# Patient Record
Sex: FEMALE | Race: WHITE | NOT HISPANIC OR LATINO | Employment: UNEMPLOYED | URBAN - METROPOLITAN AREA
[De-identification: names, ages, dates, MRNs, and addresses within clinical notes are randomized per-mention and may not be internally consistent; named-entity substitution may affect disease eponyms.]

---

## 2020-01-01 ENCOUNTER — APPOINTMENT (OUTPATIENT)
Dept: OTHER | Facility: HOSPITAL | Age: 55
End: 2020-01-01

## 2020-01-01 ENCOUNTER — OFFICE VISIT (OUTPATIENT)
Dept: INTERNAL MEDICINE | Facility: CLINIC | Age: 55
End: 2020-01-01

## 2020-01-01 ENCOUNTER — APPOINTMENT (OUTPATIENT)
Dept: PREADMISSION TESTING | Facility: HOSPITAL | Age: 55
End: 2020-01-01

## 2020-01-01 ENCOUNTER — TRANSCRIBE ORDERS (OUTPATIENT)
Dept: PHYSICAL THERAPY | Facility: CLINIC | Age: 55
End: 2020-01-01

## 2020-01-01 ENCOUNTER — TELEPHONE (OUTPATIENT)
Dept: CASE MANAGEMENT | Facility: OTHER | Age: 55
End: 2020-01-01

## 2020-01-01 ENCOUNTER — PATIENT OUTREACH (OUTPATIENT)
Dept: CASE MANAGEMENT | Facility: OTHER | Age: 55
End: 2020-01-01

## 2020-01-01 ENCOUNTER — PATIENT MESSAGE (OUTPATIENT)
Dept: INTERNAL MEDICINE | Facility: CLINIC | Age: 55
End: 2020-01-01

## 2020-01-01 ENCOUNTER — EPISODE CHANGES (OUTPATIENT)
Dept: CASE MANAGEMENT | Facility: OTHER | Age: 55
End: 2020-01-01

## 2020-01-01 ENCOUNTER — TELEPHONE (OUTPATIENT)
Dept: INTERNAL MEDICINE | Facility: CLINIC | Age: 55
End: 2020-01-01

## 2020-01-01 ENCOUNTER — TREATMENT (OUTPATIENT)
Dept: PHYSICAL THERAPY | Facility: CLINIC | Age: 55
End: 2020-01-01

## 2020-01-01 ENCOUNTER — LAB (OUTPATIENT)
Dept: LAB | Facility: HOSPITAL | Age: 55
End: 2020-01-01

## 2020-01-01 ENCOUNTER — HOSPITAL ENCOUNTER (OUTPATIENT)
Dept: MAMMOGRAPHY | Facility: HOSPITAL | Age: 55
Discharge: HOME | End: 2020-06-19
Admitting: INTERNAL MEDICINE

## 2020-01-01 VITALS
HEART RATE: 116 BPM | TEMPERATURE: 98.2 F | HEIGHT: 66 IN | RESPIRATION RATE: 16 BRPM | WEIGHT: 205.2 LBS | OXYGEN SATURATION: 94 % | BODY MASS INDEX: 32.98 KG/M2 | SYSTOLIC BLOOD PRESSURE: 128 MMHG | DIASTOLIC BLOOD PRESSURE: 80 MMHG

## 2020-01-01 DIAGNOSIS — I10 ESSENTIAL HYPERTENSION: ICD-10-CM

## 2020-01-01 DIAGNOSIS — Z47.89 ORTHOPEDIC AFTERCARE: ICD-10-CM

## 2020-01-01 DIAGNOSIS — F10.20 ALCOHOLISM (HCC): ICD-10-CM

## 2020-01-01 DIAGNOSIS — Z12.31 ENCOUNTER FOR SCREENING MAMMOGRAM FOR MALIGNANT NEOPLASM OF BREAST: ICD-10-CM

## 2020-01-01 DIAGNOSIS — G56.01 CARPAL TUNNEL SYNDROME OF RIGHT WRIST: ICD-10-CM

## 2020-01-01 DIAGNOSIS — G56.01 CARPAL TUNNEL SYNDROME OF RIGHT WRIST: Primary | ICD-10-CM

## 2020-01-01 DIAGNOSIS — G56.03 BILATERAL CARPAL TUNNEL SYNDROME: Primary | ICD-10-CM

## 2020-01-01 DIAGNOSIS — I10 ESSENTIAL HYPERTENSION: Primary | ICD-10-CM

## 2020-01-01 DIAGNOSIS — L70.0 ACNE VULGARIS: ICD-10-CM

## 2020-01-01 DIAGNOSIS — G56.03 BILATERAL CARPAL TUNNEL SYNDROME: ICD-10-CM

## 2020-01-01 LAB
ALBUMIN SERPL-MCNC: 4.4 G/DL (ref 3.5–5.2)
ALBUMIN/GLOB SERPL: 1.3 G/DL
ALP SERPL-CCNC: 101 U/L (ref 39–117)
ALT SERPL W P-5'-P-CCNC: 20 U/L (ref 1–33)
ANION GAP SERPL CALCULATED.3IONS-SCNC: 10.7 MMOL/L (ref 5–15)
AST SERPL-CCNC: 35 U/L (ref 1–32)
BILIRUB SERPL-MCNC: 0.3 MG/DL (ref 0–1.2)
BUN SERPL-MCNC: 13 MG/DL (ref 6–20)
BUN/CREAT SERPL: 14.6 (ref 7–25)
CALCIUM SPEC-SCNC: 9.3 MG/DL (ref 8.6–10.5)
CHLORIDE SERPL-SCNC: 97 MMOL/L (ref 98–107)
CO2 SERPL-SCNC: 24.3 MMOL/L (ref 22–29)
CREAT SERPL-MCNC: 0.89 MG/DL (ref 0.57–1)
DEPRECATED RDW RBC AUTO: 42.2 FL (ref 37–54)
ERYTHROCYTE [DISTWIDTH] IN BLOOD BY AUTOMATED COUNT: 12.8 % (ref 12.3–15.4)
GFR SERPL CREATININE-BSD FRML MDRD: 66 ML/MIN/1.73
GLOBULIN UR ELPH-MCNC: 3.5 GM/DL
GLUCOSE SERPL-MCNC: 91 MG/DL (ref 65–99)
HCT VFR BLD AUTO: 40.5 % (ref 34–46.6)
HGB BLD-MCNC: 14.1 G/DL (ref 12–15.9)
MCH RBC QN AUTO: 31.5 PG (ref 26.6–33)
MCHC RBC AUTO-ENTMCNC: 34.8 G/DL (ref 31.5–35.7)
MCV RBC AUTO: 90.4 FL (ref 79–97)
PLATELET # BLD AUTO: 338 10*3/MM3 (ref 140–450)
PMV BLD AUTO: 9.7 FL (ref 6–12)
POTASSIUM SERPL-SCNC: 4.2 MMOL/L (ref 3.5–5.2)
PROT SERPL-MCNC: 7.9 G/DL (ref 6–8.5)
RBC # BLD AUTO: 4.48 10*6/MM3 (ref 3.77–5.28)
REF LAB TEST METHOD: NORMAL
REF LAB TEST METHOD: NORMAL
SARS-COV-2 RNA RESP QL NAA+PROBE: NOT DETECTED
SARS-COV-2 RNA RESP QL NAA+PROBE: NOT DETECTED
SODIUM SERPL-SCNC: 132 MMOL/L (ref 136–145)
WBC # BLD AUTO: 8.93 10*3/MM3 (ref 3.4–10.8)

## 2020-01-01 PROCEDURE — 77067 SCR MAMMO BI INCL CAD: CPT

## 2020-01-01 PROCEDURE — U0002 COVID-19 LAB TEST NON-CDC: HCPCS

## 2020-01-01 PROCEDURE — C9803 HOPD COVID-19 SPEC COLLECT: HCPCS

## 2020-01-01 PROCEDURE — PTNOCHG PR CUSTOM PT NO CHARGE VISIT: Performed by: PHYSICAL THERAPIST

## 2020-01-01 PROCEDURE — 85027 COMPLETE CBC AUTOMATED: CPT | Performed by: INTERNAL MEDICINE

## 2020-01-01 PROCEDURE — 99214 OFFICE O/P EST MOD 30 MIN: CPT | Performed by: INTERNAL MEDICINE

## 2020-01-01 PROCEDURE — 77063 BREAST TOMOSYNTHESIS BI: CPT | Performed by: RADIOLOGY

## 2020-01-01 PROCEDURE — 77067 SCR MAMMO BI INCL CAD: CPT | Performed by: RADIOLOGY

## 2020-01-01 PROCEDURE — 77063 BREAST TOMOSYNTHESIS BI: CPT

## 2020-01-01 PROCEDURE — U0004 COV-19 TEST NON-CDC HGH THRU: HCPCS

## 2020-01-01 PROCEDURE — 80053 COMPREHEN METABOLIC PANEL: CPT | Performed by: INTERNAL MEDICINE

## 2020-01-01 RX ORDER — LOSARTAN POTASSIUM 50 MG/1
50 TABLET ORAL DAILY
Qty: 22 TABLET | Refills: 0 | Status: SHIPPED | OUTPATIENT
Start: 2020-01-01 | End: 2020-01-01 | Stop reason: SDUPTHER

## 2020-01-01 RX ORDER — DISULFIRAM 250 MG/1
TABLET ORAL
COMMUNITY
Start: 2020-01-01 | End: 2020-01-01

## 2020-01-01 RX ORDER — NALTREXONE HYDROCHLORIDE 50 MG/1
TABLET, FILM COATED ORAL
COMMUNITY
Start: 2020-01-01 | End: 2021-01-01

## 2020-01-01 RX ORDER — CLINDAMYCIN AND BENZOYL PEROXIDE 10; 50 MG/G; MG/G
GEL TOPICAL DAILY
Qty: 50 G | Refills: 11 | Status: SHIPPED | OUTPATIENT
Start: 2020-01-01

## 2020-01-01 RX ORDER — DOXEPIN HYDROCHLORIDE 10 MG/1
10 CAPSULE ORAL NIGHTLY
COMMUNITY
Start: 2020-01-01

## 2020-01-01 RX ORDER — PREDNISONE 20 MG/1
20 TABLET ORAL DAILY
Qty: 5 TABLET | Refills: 0 | Status: SHIPPED | OUTPATIENT
Start: 2020-01-01 | End: 2020-01-01

## 2020-01-01 RX ORDER — LOSARTAN POTASSIUM 50 MG/1
50 TABLET ORAL DAILY
Qty: 30 TABLET | Refills: 5 | Status: SHIPPED | OUTPATIENT
Start: 2020-01-01 | End: 2021-01-01 | Stop reason: SDUPTHER

## 2020-01-01 RX ORDER — BUSPIRONE HYDROCHLORIDE 10 MG/1
10 TABLET ORAL 2 TIMES DAILY
COMMUNITY
Start: 2020-01-01

## 2020-02-26 ENCOUNTER — OFFICE VISIT (OUTPATIENT)
Dept: INTERNAL MEDICINE | Facility: CLINIC | Age: 55
End: 2020-02-26

## 2020-02-26 ENCOUNTER — TELEPHONE (OUTPATIENT)
Dept: INTERNAL MEDICINE | Facility: CLINIC | Age: 55
End: 2020-02-26

## 2020-02-26 VITALS
DIASTOLIC BLOOD PRESSURE: 74 MMHG | SYSTOLIC BLOOD PRESSURE: 150 MMHG | HEART RATE: 89 BPM | TEMPERATURE: 97.2 F | RESPIRATION RATE: 16 BRPM | BODY MASS INDEX: 28.67 KG/M2 | HEIGHT: 66 IN | OXYGEN SATURATION: 98 % | WEIGHT: 178.4 LBS

## 2020-02-26 DIAGNOSIS — Z12.11 SCREENING FOR MALIGNANT NEOPLASM OF COLON: ICD-10-CM

## 2020-02-26 DIAGNOSIS — G62.9 NEUROPATHY: ICD-10-CM

## 2020-02-26 DIAGNOSIS — M50.30 DDD (DEGENERATIVE DISC DISEASE), CERVICAL: ICD-10-CM

## 2020-02-26 DIAGNOSIS — H91.93 BILATERAL HEARING LOSS, UNSPECIFIED HEARING LOSS TYPE: ICD-10-CM

## 2020-02-26 DIAGNOSIS — T14.8XXA BLISTERING OF SKIN: ICD-10-CM

## 2020-02-26 DIAGNOSIS — F41.9 ANXIETY: ICD-10-CM

## 2020-02-26 DIAGNOSIS — F10.20 ALCOHOLISM (HCC): Primary | ICD-10-CM

## 2020-02-26 DIAGNOSIS — F33.1 MODERATE EPISODE OF RECURRENT MAJOR DEPRESSIVE DISORDER (HCC): ICD-10-CM

## 2020-02-26 DIAGNOSIS — R30.0 DYSURIA: ICD-10-CM

## 2020-02-26 DIAGNOSIS — Z12.31 ENCOUNTER FOR SCREENING MAMMOGRAM FOR MALIGNANT NEOPLASM OF BREAST: ICD-10-CM

## 2020-02-26 DIAGNOSIS — G89.29 CHRONIC BILATERAL LOW BACK PAIN, UNSPECIFIED WHETHER SCIATICA PRESENT: ICD-10-CM

## 2020-02-26 DIAGNOSIS — L65.9 ALOPECIA: ICD-10-CM

## 2020-02-26 DIAGNOSIS — M54.50 CHRONIC BILATERAL LOW BACK PAIN, UNSPECIFIED WHETHER SCIATICA PRESENT: ICD-10-CM

## 2020-02-26 DIAGNOSIS — R03.0 ELEVATED BP WITHOUT DIAGNOSIS OF HYPERTENSION: ICD-10-CM

## 2020-02-26 LAB
BILIRUB BLD-MCNC: NEGATIVE MG/DL
CLARITY, POC: CLEAR
COLOR UR: YELLOW
GLUCOSE UR STRIP-MCNC: NEGATIVE MG/DL
KETONES UR QL: NEGATIVE
LEUKOCYTE EST, POC: NEGATIVE
NITRITE UR-MCNC: NEGATIVE MG/ML
PH UR: 6.5 [PH] (ref 5–8)
PROT UR STRIP-MCNC: NEGATIVE MG/DL
RBC # UR STRIP: NEGATIVE /UL
SP GR UR: 1.02 (ref 1–1.03)
UROBILINOGEN UR QL: NORMAL

## 2020-02-26 PROCEDURE — 81003 URINALYSIS AUTO W/O SCOPE: CPT | Performed by: INTERNAL MEDICINE

## 2020-02-26 PROCEDURE — 99204 OFFICE O/P NEW MOD 45 MIN: CPT | Performed by: INTERNAL MEDICINE

## 2020-02-26 PROCEDURE — 90471 IMMUNIZATION ADMIN: CPT | Performed by: INTERNAL MEDICINE

## 2020-02-26 PROCEDURE — 90632 HEPA VACCINE ADULT IM: CPT | Performed by: INTERNAL MEDICINE

## 2020-02-26 PROCEDURE — 90750 HZV VACC RECOMBINANT IM: CPT | Performed by: INTERNAL MEDICINE

## 2020-02-26 PROCEDURE — 90472 IMMUNIZATION ADMIN EACH ADD: CPT | Performed by: INTERNAL MEDICINE

## 2020-02-26 RX ORDER — THIAMINE MONONITRATE (VIT B1) 100 MG
100 TABLET ORAL DAILY
Qty: 90 TABLET | Refills: 1 | Status: SHIPPED | OUTPATIENT
Start: 2020-02-26 | End: 2020-01-01

## 2020-02-26 RX ORDER — AMOXICILLIN 500 MG
500 CAPSULE ORAL 3 TIMES DAILY
COMMUNITY
End: 2020-04-28

## 2020-02-26 RX ORDER — CALCIUM CITRATE/VITAMIN D3 200MG-6.25
50 TABLET ORAL DAILY
COMMUNITY

## 2020-02-26 RX ORDER — BUSPIRONE HYDROCHLORIDE 5 MG/1
10 TABLET ORAL 2 TIMES DAILY
COMMUNITY
End: 2020-01-01

## 2020-02-26 RX ORDER — ACYCLOVIR 50 MG/G
CREAM TOPICAL
COMMUNITY

## 2020-02-26 RX ORDER — ACYCLOVIR 200 MG/1
CAPSULE ORAL
COMMUNITY

## 2020-02-26 RX ORDER — THIAMINE MONONITRATE (VIT B1) 100 MG
100 TABLET ORAL DAILY
COMMUNITY
End: 2020-02-26 | Stop reason: SDUPTHER

## 2020-02-26 RX ORDER — FLUOXETINE HYDROCHLORIDE 20 MG/1
20 CAPSULE ORAL DAILY
COMMUNITY

## 2020-02-26 RX ORDER — TRAZODONE HYDROCHLORIDE 100 MG/1
100 TABLET ORAL NIGHTLY
COMMUNITY
End: 2020-01-01

## 2020-02-26 RX ORDER — GABAPENTIN 300 MG/1
300 CAPSULE ORAL EVERY 6 HOURS PRN
COMMUNITY
End: 2020-04-28

## 2020-02-26 RX ORDER — FOLIC ACID 1 MG/1
1 TABLET ORAL DAILY
COMMUNITY
End: 2020-02-26 | Stop reason: SDUPTHER

## 2020-02-26 RX ORDER — UREA 10 %
125 LOTION (ML) TOPICAL 2 TIMES DAILY
COMMUNITY
End: 2021-01-01

## 2020-02-26 RX ORDER — LANOLIN ALCOHOL/MO/W.PET/CERES
3000 CREAM (GRAM) TOPICAL DAILY
COMMUNITY

## 2020-02-26 RX ORDER — FOLIC ACID 1 MG/1
1 TABLET ORAL DAILY
Qty: 90 TABLET | Refills: 1 | Status: SHIPPED | OUTPATIENT
Start: 2020-02-26

## 2020-02-27 ENCOUNTER — PATIENT OUTREACH (OUTPATIENT)
Dept: CASE MANAGEMENT | Facility: OTHER | Age: 55
End: 2020-02-27

## 2020-02-27 NOTE — OUTREACH NOTE
"Patient Outreach Note    SW contacted pt to follow up. Pt stated she would like information about local AA meetings.  Pt stated she is currently living with her sister and her sister has told her that \"in order to stay she has to go to meetings everyday\".  Pt stated she is going to [facility] on Wed and Fridays and sees a therapist.    DIPTI will research local AA meeting info and let pt know    TESSIE Doyle  Ambulatory     2/27/2020, 12:47 PM      "

## 2020-02-27 NOTE — OUTREACH NOTE
Patient Outreach Note    SW attempted to reach pt to discuss local options for AA. No answer. TESSIE Maria  Ambulatory     2/27/2020, 12:49 PM

## 2020-02-27 NOTE — OUTREACH NOTE
Patient Outreach Note    SW received a call from pt. SW provided pt with contact info to local [facility] meetings. Pt was grateful for the info and stated she will call them today.     TESSIE Doyle  Ambulatory     2/27/2020, 2:21 PM

## 2020-02-28 RX ORDER — SODIUM, POTASSIUM,MAG SULFATES 17.5-3.13G
1 SOLUTION, RECONSTITUTED, ORAL ORAL TAKE AS DIRECTED
Qty: 2 BOTTLE | Refills: 0 | Status: SHIPPED | OUTPATIENT
Start: 2020-02-28 | End: 2020-03-24

## 2020-03-01 PROBLEM — R03.0 ELEVATED BP WITHOUT DIAGNOSIS OF HYPERTENSION: Status: ACTIVE | Noted: 2020-03-01

## 2020-03-01 PROBLEM — H91.93 BILATERAL HEARING LOSS: Status: ACTIVE | Noted: 2020-03-01

## 2020-03-01 PROBLEM — G62.9 NEUROPATHY: Status: ACTIVE | Noted: 2020-03-01

## 2020-03-01 PROBLEM — F41.9 ANXIETY: Status: ACTIVE | Noted: 2020-03-01

## 2020-03-01 PROBLEM — T14.8XXA BLISTERING OF SKIN: Status: ACTIVE | Noted: 2020-03-01

## 2020-03-01 PROBLEM — F10.20 ALCOHOLISM (HCC): Status: ACTIVE | Noted: 2020-03-01

## 2020-03-01 PROBLEM — G89.29 CHRONIC BILATERAL LOW BACK PAIN: Status: ACTIVE | Noted: 2020-03-01

## 2020-03-01 PROBLEM — M54.50 CHRONIC BILATERAL LOW BACK PAIN: Status: ACTIVE | Noted: 2020-03-01

## 2020-03-01 PROBLEM — F33.1 MODERATE EPISODE OF RECURRENT MAJOR DEPRESSIVE DISORDER (HCC): Status: ACTIVE | Noted: 2020-03-01

## 2020-03-01 PROBLEM — L65.9 ALOPECIA: Status: ACTIVE | Noted: 2020-03-01

## 2020-03-01 PROBLEM — M50.30 DDD (DEGENERATIVE DISC DISEASE), CERVICAL: Status: ACTIVE | Noted: 2020-03-01

## 2020-03-04 ENCOUNTER — OUTSIDE FACILITY SERVICE (OUTPATIENT)
Dept: GASTROENTEROLOGY | Facility: CLINIC | Age: 55
End: 2020-03-04

## 2020-03-04 PROCEDURE — 45378 DIAGNOSTIC COLONOSCOPY: CPT | Performed by: INTERNAL MEDICINE

## 2020-03-06 ENCOUNTER — PATIENT OUTREACH (OUTPATIENT)
Dept: CASE MANAGEMENT | Facility: OTHER | Age: 55
End: 2020-03-06

## 2020-03-06 NOTE — OUTREACH NOTE
Patient Outreach Note    SW attemtped to contact pt. No answer. TESSIE Maria  Ambulatory     3/6/2020, 10:16 AM

## 2020-03-24 ENCOUNTER — OFFICE VISIT (OUTPATIENT)
Dept: INTERNAL MEDICINE | Facility: CLINIC | Age: 55
End: 2020-03-24

## 2020-03-24 VITALS
SYSTOLIC BLOOD PRESSURE: 148 MMHG | HEIGHT: 66 IN | TEMPERATURE: 97.5 F | WEIGHT: 179.8 LBS | BODY MASS INDEX: 28.9 KG/M2 | DIASTOLIC BLOOD PRESSURE: 82 MMHG | OXYGEN SATURATION: 99 % | HEART RATE: 91 BPM | RESPIRATION RATE: 16 BRPM

## 2020-03-24 DIAGNOSIS — I10 ESSENTIAL HYPERTENSION: ICD-10-CM

## 2020-03-24 DIAGNOSIS — Z13.220 SCREENING, LIPID: ICD-10-CM

## 2020-03-24 DIAGNOSIS — F10.20 ALCOHOLISM (HCC): Primary | ICD-10-CM

## 2020-03-24 DIAGNOSIS — Z86.39 HISTORY OF IRON DEFICIENCY: ICD-10-CM

## 2020-03-24 DIAGNOSIS — Z86.39 HISTORY OF VITAMIN D DEFICIENCY: ICD-10-CM

## 2020-03-24 DIAGNOSIS — Z13.1 SCREENING FOR DIABETES MELLITUS: ICD-10-CM

## 2020-03-24 PROCEDURE — 99214 OFFICE O/P EST MOD 30 MIN: CPT | Performed by: INTERNAL MEDICINE

## 2020-03-24 RX ORDER — CLONIDINE HYDROCHLORIDE 0.1 MG/1
0.1 TABLET ORAL DAILY
COMMUNITY

## 2020-03-24 RX ORDER — DISULFIRAM 500 MG/1
500 TABLET ORAL DAILY
COMMUNITY
End: 2020-01-01

## 2020-03-24 RX ORDER — FERROUS SULFATE TAB EC 324 MG (65 MG FE EQUIVALENT) 324 (65 FE) MG
324 TABLET DELAYED RESPONSE ORAL 2 TIMES DAILY
COMMUNITY
End: 2020-01-01

## 2020-03-24 RX ORDER — LOSARTAN POTASSIUM 25 MG/1
25 TABLET ORAL DAILY
Qty: 90 TABLET | Refills: 0 | Status: SHIPPED | OUTPATIENT
Start: 2020-03-24 | End: 2020-04-28 | Stop reason: SDUPTHER

## 2020-03-24 NOTE — PATIENT INSTRUCTIONS
Call if BP persistently high, over 160/100, or persistently low, less than 100/60. Goal is 130/80 or less.

## 2020-03-24 NOTE — PROGRESS NOTES
Internal Medicine Follow Up    Chief Complaint  Laura Jang is a 54 y.o. female who presents today for follow up of chronic medical conditions outlined below.    Chief Complaint   Patient presents with   • Alcohol Problem     4 week follow up        HPI  Ms. Jang comes in today for follow up. She has been sober for 6 weeks. No cravings. On antabuse prescribed by her psychiatrist, Dr. uLca Cedeño. She continues to see her therapist and will see her today. Will see her psychiatrist tomorrow. She is participating daily and sometimes twice daily in meetings online. She finds this to be helpful. She notes that her sister still restricts her ability to drive. She is unsure when she will be able to drive again but she is doing okay. Hopeful that she will be able to drive again eventually. She reports recently trying to donate blood and being told that she was borderline iron deficient. She has had recent normal colonoscopy, has no visible GI bleeding, and no vaginal bleeding. She is taking an iron supplement and eating iron rich food. She also notes recently elevated BP, brings a log today. She forgot to mention that she was previously prescribed antihypertensives prior to bariatric surgery (lap band and then gastric sleeve) and then more recently was prescribed clonidine PRN which she has been taking about 0.1mg daily.           Review of Systems  Review of Systems   Constitutional: Negative.    Respiratory: Negative.    Cardiovascular: Negative.    Gastrointestinal: Negative for blood in stool.   Genitourinary: Negative for vaginal bleeding.   Psychiatric/Behavioral: Negative.         Current Medications  Current Outpatient Medications on File Prior to Visit   Medication Sig Dispense Refill   • acyclovir (ZOVIRAX) 200 MG capsule Take  by mouth Every 4 (Four) Hours While Awake. 400mg the first day and 200mg for 4 days.     • acyclovir (ZOVIRAX) 5 % cream Apply  topically to the appropriate area as directed Every 3 (Three)  "Hours.     • B Complex-Biotin-FA (B-100 B-COMPLEX PO) Take  by mouth Daily.     • BIOTIN 5000 PO Take 5,000 mcg by mouth Daily.     • busPIRone (BUSPAR) 5 MG tablet Take 5 mg by mouth Daily With Lunch.     • Cholecalciferol (VITAMIN D3) 125 MCG (5000 UT) capsule capsule Take 5,000 Units by mouth Daily.     • cloNIDine (CATAPRES) 0.1 MG tablet Take 0.1 mg by mouth Daily.     • disulfiram (ANTABUSE) 500 MG tablet tablet Take 500 mg by mouth Daily.     • ferrous sulfate 324 (65 Fe) MG tablet delayed-release EC tablet Take 324 mg by mouth 2 (Two) Times a Day.     • FLUoxetine (PROzac) 20 MG capsule Take 20 mg by mouth Daily.     • folic acid (FOLVITE) 1 MG tablet Take 1 tablet by mouth Daily. 90 tablet 1   • gabapentin (NEURONTIN) 300 MG capsule Take 300 mg by mouth Every 6 (Six) Hours As Needed.     • L-Tryptophan 500 MG capsule Take 500 mg by mouth 3 (Three) Times a Day.     • magnesium, as, gluconate (MAGONATE) 500 (27 Mg) MG tablet Take 125 mg by mouth 2 (Two) Times a Day.     • Multiple Vitamins-Minerals (HAIR SKIN AND NAILS FORMULA PO) Take 2,500 mcg by mouth Daily.     • Multiple Vitamins-Minerals (MULTIVITAMIN GUMMIES WOMENS) chewable tablet Chew 50 mg Daily.     • sodium-potassium-magnesium sulfates (SUPREP BOWEL PREP KIT) 17.5-3.13-1.6 GM/177ML solution oral solution Take 1 bottle by mouth Take As Directed. Follow instructions that were mailed to your home. If you didn't receive these call [phone]. 2 bottle 0   • thiamine (VITAMIN B-1) 100 MG tablet Take 1 tablet by mouth Daily. 90 tablet 1   • traZODone (DESYREL) 100 MG tablet Take 100 mg by mouth Every Night.     • vitamin B-12 (CYANOCOBALAMIN) 1000 MCG tablet Take 3,000 mcg by mouth Daily.       No current facility-administered medications on file prior to visit.        Allergies  No Known Allergies    Objective  Visit Vitals  /82   Pulse 91   Temp 97.5 °F (36.4 °C)   Resp 16   Ht 167.6 cm (65.98\")   Wt 81.6 kg (179 lb 12.8 oz)   SpO2 99%   BMI " 29.03 kg/m²        Physical Exam  Physical Exam   Constitutional: She is oriented to person, place, and time. She appears well-developed and well-nourished. No distress.   HENT:   Head: Normocephalic and atraumatic.   Eyes: Conjunctivae are normal.   Cardiovascular: Normal rate, regular rhythm and normal heart sounds.   Pulmonary/Chest: Effort normal and breath sounds normal. No respiratory distress.   Musculoskeletal: She exhibits no edema.   Neurological: She is alert and oriented to person, place, and time.   Skin: Skin is warm and dry.   Psychiatric: She has a normal mood and affect. Her behavior is normal.   Nursing note and vitals reviewed.      Results  Results for orders placed or performed in visit on 02/26/20   POC Urinalysis Dipstick, Automated   Result Value Ref Range    Color Yellow Yellow, Straw, Dark Yellow, Cristina    Clarity, UA Clear Clear    Specific Gravity  1.020 1.005 - 1.030    pH, Urine 6.5 5.0 - 8.0    Leukocytes Negative Negative    Nitrite, UA Negative Negative    Protein, POC Negative Negative mg/dL    Glucose, UA Negative Negative, 1000 mg/dL (3+) mg/dL    Ketones, UA Negative Negative    Urobilinogen, UA Normal Normal    Bilirubin Negative Negative    Blood, UA Negative Negative        Assessment and Plan  Laura was seen today for alcohol problem.    Diagnoses and all orders for this visit:    Alcoholism (CMS/[facility])  - Sober x 6 weeks on antabuse, seeing psychiatrist/therapist, daily meetings  - On thiamine, folate. Will continue for now.  - CMP ordered    Essential hypertension  - BP above goal in office and on home readings, averaging 140-150/90  - Will start losartan 25mg daily  - Patient to get baseline CMP, will need repeat BMP in 7-14 days.  - Continue home monitoring.    History of iron deficiency  - She reports iron deficiency on screening prior to attempt to donate blood  - No overt GI bleeding, recent normal colonoscopy. No vaginal bleeding. Has had gastric sleeve and lap band but  no gastric bypass.   - Currently on iron supplementation.  - Will obtain iron profile, ferritin, CBC.    History of vitamin D deficiency  - On vitamin D supplement daily, will obtain vitamin D level    Screening for diabetes mellitus  - A1c ordered    Screening, lipid  - Lipid panel ordered    Health Maintenance  - Pap smear: Reports normal pap smear 12/2019, will need to refer to GYN closer to 12/2020  - Mammogram: Scheduled 6/19/2020  - Colonoscopy: 3/2020, repeat 10y  - Immunizations: Flu UTD. Hep A #1 2/2020. Shingrix #1 2/2020. Tdap 2012.  - HCV: Reports negative screening  - Depression screening: Diagnosed and treated for depression.    Return in about 2 months (around 5/24/2020) for Follow up, labs at your next available convenience.

## 2020-03-25 ENCOUNTER — LAB (OUTPATIENT)
Dept: LAB | Facility: HOSPITAL | Age: 55
End: 2020-03-25

## 2020-03-25 DIAGNOSIS — Z86.39 HISTORY OF VITAMIN D DEFICIENCY: ICD-10-CM

## 2020-03-25 DIAGNOSIS — F10.20 ALCOHOLISM (HCC): ICD-10-CM

## 2020-03-25 DIAGNOSIS — I10 ESSENTIAL HYPERTENSION: ICD-10-CM

## 2020-03-25 DIAGNOSIS — Z86.39 HISTORY OF IRON DEFICIENCY: ICD-10-CM

## 2020-03-25 DIAGNOSIS — Z13.220 SCREENING, LIPID: ICD-10-CM

## 2020-03-25 DIAGNOSIS — Z13.1 SCREENING FOR DIABETES MELLITUS: ICD-10-CM

## 2020-03-25 LAB
25(OH)D3 SERPL-MCNC: 73.7 NG/ML (ref 30–100)
ALBUMIN SERPL-MCNC: 4.4 G/DL (ref 3.5–5.2)
ALBUMIN/GLOB SERPL: 1.3 G/DL
ALP SERPL-CCNC: 154 U/L (ref 39–117)
ALT SERPL W P-5'-P-CCNC: 32 U/L (ref 1–33)
ANION GAP SERPL CALCULATED.3IONS-SCNC: 13.3 MMOL/L (ref 5–15)
AST SERPL-CCNC: 33 U/L (ref 1–32)
BASOPHILS # BLD AUTO: 0.07 10*3/MM3 (ref 0–0.2)
BASOPHILS NFR BLD AUTO: 1.1 % (ref 0–1.5)
BILIRUB SERPL-MCNC: 0.3 MG/DL (ref 0.2–1.2)
BUN BLD-MCNC: 15 MG/DL (ref 6–20)
BUN/CREAT SERPL: 18.8 (ref 7–25)
CALCIUM SPEC-SCNC: 10 MG/DL (ref 8.6–10.5)
CHLORIDE SERPL-SCNC: 102 MMOL/L (ref 98–107)
CHOLEST SERPL-MCNC: 198 MG/DL (ref 0–200)
CO2 SERPL-SCNC: 24.7 MMOL/L (ref 22–29)
CREAT BLD-MCNC: 0.8 MG/DL (ref 0.57–1)
DEPRECATED RDW RBC AUTO: 45.4 FL (ref 37–54)
EOSINOPHIL # BLD AUTO: 0.42 10*3/MM3 (ref 0–0.4)
EOSINOPHIL NFR BLD AUTO: 6.7 % (ref 0.3–6.2)
ERYTHROCYTE [DISTWIDTH] IN BLOOD BY AUTOMATED COUNT: 13.8 % (ref 12.3–15.4)
FERRITIN SERPL-MCNC: 60.1 NG/ML (ref 13–150)
GFR SERPL CREATININE-BSD FRML MDRD: 74 ML/MIN/1.73
GLOBULIN UR ELPH-MCNC: 3.4 GM/DL
GLUCOSE BLD-MCNC: 101 MG/DL (ref 65–99)
HBA1C MFR BLD: 5.2 % (ref 4.8–5.6)
HCT VFR BLD AUTO: 36.9 % (ref 34–46.6)
HDLC SERPL-MCNC: 54 MG/DL (ref 40–60)
HGB BLD-MCNC: 11.9 G/DL (ref 12–15.9)
IMM GRANULOCYTES # BLD AUTO: 0.01 10*3/MM3 (ref 0–0.05)
IMM GRANULOCYTES NFR BLD AUTO: 0.2 % (ref 0–0.5)
IRON 24H UR-MRATE: 99 MCG/DL (ref 37–145)
IRON SATN MFR SERPL: 22 % (ref 20–50)
LDLC SERPL CALC-MCNC: 121 MG/DL (ref 0–100)
LDLC/HDLC SERPL: 2.23 {RATIO}
LYMPHOCYTES # BLD AUTO: 1.95 10*3/MM3 (ref 0.7–3.1)
LYMPHOCYTES NFR BLD AUTO: 30.9 % (ref 19.6–45.3)
MCH RBC QN AUTO: 29.4 PG (ref 26.6–33)
MCHC RBC AUTO-ENTMCNC: 32.2 G/DL (ref 31.5–35.7)
MCV RBC AUTO: 91.1 FL (ref 79–97)
MONOCYTES # BLD AUTO: 0.69 10*3/MM3 (ref 0.1–0.9)
MONOCYTES NFR BLD AUTO: 10.9 % (ref 5–12)
NEUTROPHILS # BLD AUTO: 3.17 10*3/MM3 (ref 1.7–7)
NEUTROPHILS NFR BLD AUTO: 50.2 % (ref 42.7–76)
NRBC BLD AUTO-RTO: 0 /100 WBC (ref 0–0.2)
PLATELET # BLD AUTO: 284 10*3/MM3 (ref 140–450)
PMV BLD AUTO: 11.3 FL (ref 6–12)
POTASSIUM BLD-SCNC: 4.4 MMOL/L (ref 3.5–5.2)
PROT SERPL-MCNC: 7.8 G/DL (ref 6–8.5)
RBC # BLD AUTO: 4.05 10*6/MM3 (ref 3.77–5.28)
SODIUM BLD-SCNC: 140 MMOL/L (ref 136–145)
TIBC SERPL-MCNC: 453 MCG/DL (ref 298–536)
TRANSFERRIN SERPL-MCNC: 304 MG/DL (ref 200–360)
TRIGL SERPL-MCNC: 117 MG/DL (ref 0–150)
VLDLC SERPL-MCNC: 23.4 MG/DL (ref 5–40)
WBC NRBC COR # BLD: 6.31 10*3/MM3 (ref 3.4–10.8)

## 2020-03-25 PROCEDURE — 85025 COMPLETE CBC W/AUTO DIFF WBC: CPT | Performed by: INTERNAL MEDICINE

## 2020-03-25 PROCEDURE — 82306 VITAMIN D 25 HYDROXY: CPT | Performed by: INTERNAL MEDICINE

## 2020-03-25 PROCEDURE — 84466 ASSAY OF TRANSFERRIN: CPT | Performed by: INTERNAL MEDICINE

## 2020-03-25 PROCEDURE — 80053 COMPREHEN METABOLIC PANEL: CPT | Performed by: INTERNAL MEDICINE

## 2020-03-25 PROCEDURE — 80061 LIPID PANEL: CPT | Performed by: INTERNAL MEDICINE

## 2020-03-25 PROCEDURE — 82728 ASSAY OF FERRITIN: CPT | Performed by: INTERNAL MEDICINE

## 2020-03-25 PROCEDURE — 83540 ASSAY OF IRON: CPT | Performed by: INTERNAL MEDICINE

## 2020-03-25 PROCEDURE — 83036 HEMOGLOBIN GLYCOSYLATED A1C: CPT | Performed by: INTERNAL MEDICINE

## 2020-04-02 ENCOUNTER — TELEPHONE (OUTPATIENT)
Dept: INTERNAL MEDICINE | Facility: CLINIC | Age: 55
End: 2020-04-02

## 2020-04-02 DIAGNOSIS — I10 ESSENTIAL HYPERTENSION: Primary | ICD-10-CM

## 2020-04-02 NOTE — TELEPHONE ENCOUNTER
Patient requesting a call back from the provider/nurse. Patient stated that since being seen last week she was advised by Dr. Santizo to come back 7-10 days to lave labs completed. She is wanting to know if she has orders and when she can come in to have those completed.    Please call patient and advise @[phone]

## 2020-04-02 NOTE — TELEPHONE ENCOUNTER
I have ordered her kidney function and electrolytes to be repeated. She can come anytime that the lab is open. She should call ahead as hours have been changing. She does not need an appointment or to be fasting.

## 2020-04-03 ENCOUNTER — TELEPHONE (OUTPATIENT)
Dept: INTERNAL MEDICINE | Facility: CLINIC | Age: 55
End: 2020-04-03

## 2020-04-03 ENCOUNTER — LAB (OUTPATIENT)
Dept: LAB | Facility: HOSPITAL | Age: 55
End: 2020-04-03

## 2020-04-03 DIAGNOSIS — I10 ESSENTIAL HYPERTENSION: ICD-10-CM

## 2020-04-03 DIAGNOSIS — M25.50 ARTHRALGIA, UNSPECIFIED JOINT: Primary | ICD-10-CM

## 2020-04-03 LAB
ANION GAP SERPL CALCULATED.3IONS-SCNC: 15.4 MMOL/L (ref 5–15)
BUN BLD-MCNC: 15 MG/DL (ref 6–20)
BUN/CREAT SERPL: 16.7 (ref 7–25)
CALCIUM SPEC-SCNC: 9.8 MG/DL (ref 8.6–10.5)
CHLORIDE SERPL-SCNC: 100 MMOL/L (ref 98–107)
CO2 SERPL-SCNC: 24.6 MMOL/L (ref 22–29)
CREAT BLD-MCNC: 0.9 MG/DL (ref 0.57–1)
GFR SERPL CREATININE-BSD FRML MDRD: 65 ML/MIN/1.73
GLUCOSE BLD-MCNC: 139 MG/DL (ref 65–99)
POTASSIUM BLD-SCNC: 4.2 MMOL/L (ref 3.5–5.2)
SODIUM BLD-SCNC: 140 MMOL/L (ref 136–145)

## 2020-04-03 PROCEDURE — 80048 BASIC METABOLIC PNL TOTAL CA: CPT | Performed by: INTERNAL MEDICINE

## 2020-04-03 NOTE — TELEPHONE ENCOUNTER
I have ordered a uric acid level. However I do not think she has gout based on the appearance of her joints in office previously. An elevated uric acid level is not the only diagnostic criteria for gout and we will need to take into consideration several other factors.

## 2020-04-03 NOTE — TELEPHONE ENCOUNTER
PT CALLED REQUESTING A URIC ACID TEST. PT STATES SHE IS WANTING TO BE TESTED FOR GOUT. PT STATED SHE HAS BEEN HAVING PAIN AND NUMBING IN HER THUMB AND FEET.     PT IS REQUESTING A CALL BACK.         CONTACT: [phone]

## 2020-04-06 ENCOUNTER — TELEPHONE (OUTPATIENT)
Dept: INTERNAL MEDICINE | Facility: CLINIC | Age: 55
End: 2020-04-06

## 2020-04-21 ENCOUNTER — HOSPITAL ENCOUNTER (OUTPATIENT)
Dept: NEUROLOGY | Facility: HOSPITAL | Age: 55
Discharge: HOME | End: 2020-04-21
Admitting: INTERNAL MEDICINE

## 2020-04-21 DIAGNOSIS — G62.9 NEUROPATHY: ICD-10-CM

## 2020-04-21 PROCEDURE — 95910 NRV CNDJ TEST 7-8 STUDIES: CPT

## 2020-04-21 PROCEDURE — 95886 MUSC TEST DONE W/N TEST COMP: CPT

## 2020-04-24 DIAGNOSIS — G56.03 CARPAL TUNNEL SYNDROME ON BOTH SIDES: Primary | ICD-10-CM

## 2020-04-28 ENCOUNTER — TELEMEDICINE (OUTPATIENT)
Dept: INTERNAL MEDICINE | Facility: CLINIC | Age: 55
End: 2020-04-28

## 2020-04-28 VITALS — DIASTOLIC BLOOD PRESSURE: 102 MMHG | SYSTOLIC BLOOD PRESSURE: 165 MMHG

## 2020-04-28 DIAGNOSIS — I10 ESSENTIAL HYPERTENSION: Primary | ICD-10-CM

## 2020-04-28 DIAGNOSIS — G56.03 BILATERAL CARPAL TUNNEL SYNDROME: ICD-10-CM

## 2020-04-28 PROCEDURE — 99214 OFFICE O/P EST MOD 30 MIN: CPT | Performed by: INTERNAL MEDICINE

## 2020-04-28 RX ORDER — CETIRIZINE HYDROCHLORIDE 10 MG/1
10 TABLET ORAL DAILY
COMMUNITY

## 2020-04-28 RX ORDER — LOSARTAN POTASSIUM 50 MG/1
50 TABLET ORAL DAILY
Qty: 90 TABLET | Refills: 0 | Status: SHIPPED | OUTPATIENT
Start: 2020-04-28 | End: 2020-01-01 | Stop reason: SDUPTHER

## 2020-04-28 NOTE — PROGRESS NOTES
Internal Medicine Follow Up    Chief Complaint  Laura Jang is a 54 y.o. female who presents today for follow up of chronic medical conditions outlined below.    Chief Complaint   Patient presents with   • Carpal Tunnel   • Hypertension        HPI  Ms. Jagn was evaluated by telehealth video visit today for follow up of HTN and recently diagnosed carpal tunnel syndrome. She reports that she continues to have numbness and tingling in both hands, but R more than L. This occurs with prolonged use of her cell phone and with riding a bike. Improves with shaking her hands. She denies overt weakness but endorses difficulty opening jars and is unable to pursue quilting and embroidery. She has had this problem since being pregnant with her son who is now 25 years old. Recently had EMG/NCS for formal diagnosis confirming CTS. She has never worn a wrist extension splint. She went to her neurologist's office recently for script for the splint and was told that insurance would not pay unless she had a face to face visit with me first. She also plans to discuss potential exercises with her physical therapist who she will start seeing every other week for her cervical DDD.    She has been monitoring her blood pressure twice daily at home and reports readings averaging 155/85 an hour after taking her losartan 25mg which she takes at night. She reports reading of 165/102 this morning. She is not having vision changes, chest pain, headaches, or SOA.       Review of Systems  Review of Systems   Eyes: Negative for visual disturbance.   Respiratory: Negative for shortness of breath.    Cardiovascular: Negative for chest pain.   Neurological: Positive for weakness (unable to open jars, quilt) and numbness (hands bilaterally). Negative for headache.        Current Medications  Current Outpatient Medications on File Prior to Visit   Medication Sig Dispense Refill   • acyclovir (ZOVIRAX) 200 MG capsule Take  by mouth Every 4 (Four) Hours  While Awake. 400mg the first day and 200mg for 4 days.     • acyclovir (ZOVIRAX) 5 % cream Apply  topically to the appropriate area as directed Every 3 (Three) Hours.     • B Complex-Biotin-FA (B-100 B-COMPLEX PO) Take  by mouth Daily.     • BIOTIN 5000 PO Take 5,000 mcg by mouth Daily.     • Brexpiprazole (Rexulti) 0.5 MG tablet Take 0.5 mg by mouth Daily.     • busPIRone (BUSPAR) 5 MG tablet Take 10 mg by mouth 2 (Two) Times a Day.     • cetirizine (zyrTEC) 10 MG tablet Take 10 mg by mouth Daily.     • Cholecalciferol (VITAMIN D3) 125 MCG (5000 UT) capsule capsule Take 5,000 Units by mouth Daily.     • cloNIDine (CATAPRES) 0.1 MG tablet Take 0.1 mg by mouth Daily.     • disulfiram (ANTABUSE) 500 MG tablet tablet Take 500 mg by mouth Daily.     • ferrous sulfate 324 (65 Fe) MG tablet delayed-release EC tablet Take 324 mg by mouth 2 (Two) Times a Day.     • FLUoxetine (PROzac) 20 MG capsule Take 20 mg by mouth Daily.     • folic acid (FOLVITE) 1 MG tablet Take 1 tablet by mouth Daily. 90 tablet 1   • magnesium, as, gluconate (MAGONATE) 500 (27 Mg) MG tablet Take 125 mg by mouth 2 (Two) Times a Day.     • Multiple Vitamins-Minerals (HAIR SKIN AND NAILS FORMULA PO) Take 2,500 mcg by mouth Daily.     • Multiple Vitamins-Minerals (MULTIVITAMIN GUMMIES WOMENS) chewable tablet Chew 50 mg Daily.     • thiamine (VITAMIN B-1) 100 MG tablet Take 1 tablet by mouth Daily. 90 tablet 1   • traZODone (DESYREL) 100 MG tablet Take 100 mg by mouth Every Night.     • vitamin B-12 (CYANOCOBALAMIN) 1000 MCG tablet Take 3,000 mcg by mouth Daily.     • [DISCONTINUED] losartan (COZAAR) 25 MG tablet Take 1 tablet by mouth Daily. 90 tablet 0   • [DISCONTINUED] gabapentin (NEURONTIN) 300 MG capsule Take 300 mg by mouth Every 6 (Six) Hours As Needed.     • [DISCONTINUED] L-Tryptophan 500 MG capsule Take 500 mg by mouth 3 (Three) Times a Day.       No current facility-administered medications on file prior to visit.        Allergies  No Known  Allergies    Objective  Visit Vitals  BP (!) 165/102        Physical Exam  Physical Exam   Constitutional: She is oriented to person, place, and time. She appears well-developed and well-nourished. No distress.   HENT:   Head: Normocephalic and atraumatic.   Right Ear: External ear normal.   Left Ear: External ear normal.   Nose: Nose normal.   Eyes: Conjunctivae are normal.   Wearing glasses   Pulmonary/Chest: Effort normal. No respiratory distress.   Neurological: She is alert and oriented to person, place, and time.   Psychiatric: She has a normal mood and affect. Her behavior is normal.   Vitals reviewed.      Results  Results for orders placed or performed in visit on 04/03/20   Basic metabolic panel   Result Value Ref Range    Glucose 139 (H) 65 - 99 mg/dL    BUN 15 6 - 20 mg/dL    Creatinine 0.90 0.57 - 1.00 mg/dL    Sodium 140 136 - 145 mmol/L    Potassium 4.2 3.5 - 5.2 mmol/L    Chloride 100 98 - 107 mmol/L    CO2 24.6 22.0 - 29.0 mmol/L    Calcium 9.8 8.6 - 10.5 mg/dL    eGFR Non African Amer 65 >60 mL/min/1.73    BUN/Creatinine Ratio 16.7 7.0 - 25.0    Anion Gap 15.4 (H) 5.0 - 15.0 mmol/L        Assessment and Plan  Laura was seen today for carpal tunnel and hypertension.    Diagnoses and all orders for this visit:    Essential hypertension  - BP above goal on losartan 25mg daily, will increase to 50mg daily.  - BMP in 10-14 days  - Continue to keep log and follow up in 1 month    Bilateral carpal tunnel syndrome  - Recently confirmed moderate R and mild L CTS on EMG/NCS  - Will try conservative treatment with nocturnal wrist extension splints, ice, NSAIDs, activity modification, PT. If this fails will send to hand surgeon to evaluate for carpal tunnel release.    Health Maintenance  - Pap smear: Reports normal pap smear 12/2019, will need to refer to GYN closer to 12/2020  - Mammogram: Scheduled 6/19/2020  - Colonoscopy: 3/2020, repeat 10y  - Immunizations: Flu UTD. Hep A #1 2/2020. Shingrix #1 2/2020.  Tdap 2012.  - HCV: Reports negative screening  - Depression screening: Diagnosed and treated for depression.     Return in about 29 days (around 5/27/2020) for Follow up carpal tunnel syndrome and HTN as already scheduled.

## 2020-05-27 ENCOUNTER — OFFICE VISIT (OUTPATIENT)
Dept: INTERNAL MEDICINE | Facility: CLINIC | Age: 55
End: 2020-05-27

## 2020-05-27 ENCOUNTER — LAB (OUTPATIENT)
Dept: LAB | Facility: HOSPITAL | Age: 55
End: 2020-05-27

## 2020-05-27 VITALS
HEART RATE: 75 BPM | RESPIRATION RATE: 16 BRPM | BODY MASS INDEX: 28.93 KG/M2 | DIASTOLIC BLOOD PRESSURE: 78 MMHG | OXYGEN SATURATION: 100 % | WEIGHT: 180 LBS | SYSTOLIC BLOOD PRESSURE: 128 MMHG | HEIGHT: 66 IN | TEMPERATURE: 98.4 F

## 2020-05-27 DIAGNOSIS — M25.50 ARTHRALGIA, UNSPECIFIED JOINT: ICD-10-CM

## 2020-05-27 DIAGNOSIS — L65.9 ALOPECIA: ICD-10-CM

## 2020-05-27 DIAGNOSIS — F10.20 ALCOHOLISM (HCC): ICD-10-CM

## 2020-05-27 DIAGNOSIS — I10 ESSENTIAL HYPERTENSION: Primary | ICD-10-CM

## 2020-05-27 DIAGNOSIS — G56.03 BILATERAL CARPAL TUNNEL SYNDROME: ICD-10-CM

## 2020-05-27 DIAGNOSIS — I10 ESSENTIAL HYPERTENSION: ICD-10-CM

## 2020-05-27 DIAGNOSIS — F41.9 ANXIETY: ICD-10-CM

## 2020-05-27 LAB
ANION GAP SERPL CALCULATED.3IONS-SCNC: 10.3 MMOL/L (ref 5–15)
BUN BLD-MCNC: 19 MG/DL (ref 6–20)
BUN/CREAT SERPL: 20.7 (ref 7–25)
CALCIUM SPEC-SCNC: 10 MG/DL (ref 8.6–10.5)
CHLORIDE SERPL-SCNC: 96 MMOL/L (ref 98–107)
CO2 SERPL-SCNC: 26.7 MMOL/L (ref 22–29)
CREAT BLD-MCNC: 0.92 MG/DL (ref 0.57–1)
GFR SERPL CREATININE-BSD FRML MDRD: 63 ML/MIN/1.73
GLUCOSE BLD-MCNC: 99 MG/DL (ref 65–99)
POTASSIUM BLD-SCNC: 5.1 MMOL/L (ref 3.5–5.2)
SODIUM BLD-SCNC: 133 MMOL/L (ref 136–145)
URATE SERPL-MCNC: 6.5 MG/DL (ref 2.4–5.7)

## 2020-05-27 PROCEDURE — 84550 ASSAY OF BLOOD/URIC ACID: CPT | Performed by: INTERNAL MEDICINE

## 2020-05-27 PROCEDURE — 90471 IMMUNIZATION ADMIN: CPT | Performed by: INTERNAL MEDICINE

## 2020-05-27 PROCEDURE — 90750 HZV VACC RECOMBINANT IM: CPT | Performed by: INTERNAL MEDICINE

## 2020-05-27 PROCEDURE — 80048 BASIC METABOLIC PNL TOTAL CA: CPT | Performed by: INTERNAL MEDICINE

## 2020-05-27 PROCEDURE — 36415 COLL VENOUS BLD VENIPUNCTURE: CPT

## 2020-05-27 PROCEDURE — 99214 OFFICE O/P EST MOD 30 MIN: CPT | Performed by: INTERNAL MEDICINE

## 2020-05-27 NOTE — PROGRESS NOTES
Internal Medicine Follow Up    Chief Complaint  Laura Jang is a 54 y.o. female who presents today for follow up of chronic medical conditions outlined below.    Chief Complaint   Patient presents with   • Hypertension     Follow Up        HPI  Ms. Jang comes in today for scheduled follow up. She recently took a several day trip to [place] by herself to get some of her things. This was against her sister's wishes but was what she wanted to do. Due to the trip her stress has been higher and she has been more anxious. Her anxiety medications remain the same however she notes that she never got buspar from her pharmacy and needs to call her psychiatrist. She monitored BP at home and due to stress it has been high. No chest pain or SOA. She has remained sober but reports it was difficult while in [place]. She was glad to have anatabuse as backup. Her CTS was no better with nocturnal bracing, in fact she feels that it caused increased pain in both of her arms. This pain was relieved with discontinuation of braces. She additionally notes longterm dry scalp at the site of prior wound from fall. She has been using several specialty shampoos, serums to stimulate hair growth with minimal success in this spot but some success on the remainder of her scalp.       Review of Systems  Review of Systems   Constitutional:        +weight gain due to poor diet   Respiratory: Negative.    Cardiovascular: Negative.    Musculoskeletal: Positive for arthralgias and myalgias.   Skin:        +thinning hair, dry scalp   Neurological: Positive for numbness (hands intermittently).   Psychiatric/Behavioral: Positive for stress. The patient is nervous/anxious.         Current Medications  Current Outpatient Medications on File Prior to Visit   Medication Sig Dispense Refill   • acyclovir (ZOVIRAX) 200 MG capsule Take  by mouth Every 4 (Four) Hours While Awake. 400mg the first day and 200mg for 4 days.     • acyclovir (ZOVIRAX) 5 % cream Apply   "topically to the appropriate area as directed Every 3 (Three) Hours.     • B Complex-Biotin-FA (B-100 B-COMPLEX PO) Take  by mouth Daily.     • BIOTIN 5000 PO Take 5,000 mcg by mouth Daily.     • Brexpiprazole (Rexulti) 0.5 MG tablet Take 0.5 mg by mouth Daily.     • cetirizine (zyrTEC) 10 MG tablet Take 10 mg by mouth Daily.     • Cholecalciferol (VITAMIN D3) 125 MCG (5000 UT) capsule capsule Take 5,000 Units by mouth Daily.     • cloNIDine (CATAPRES) 0.1 MG tablet Take 0.1 mg by mouth Daily.     • disulfiram (ANTABUSE) 500 MG tablet tablet Take 500 mg by mouth Daily.     • ferrous sulfate 324 (65 Fe) MG tablet delayed-release EC tablet Take 324 mg by mouth 2 (Two) Times a Day.     • FLUoxetine (PROzac) 20 MG capsule Take 20 mg by mouth Daily.     • folic acid (FOLVITE) 1 MG tablet Take 1 tablet by mouth Daily. 90 tablet 1   • losartan (COZAAR) 50 MG tablet Take 1 tablet by mouth Daily. 90 tablet 0   • magnesium, as, gluconate (MAGONATE) 500 (27 Mg) MG tablet Take 125 mg by mouth 2 (Two) Times a Day.     • Multiple Vitamins-Minerals (HAIR SKIN AND NAILS FORMULA PO) Take 2,500 mcg by mouth Daily.     • Multiple Vitamins-Minerals (MULTIVITAMIN GUMMIES WOMENS) chewable tablet Chew 50 mg Daily.     • thiamine (VITAMIN B-1) 100 MG tablet Take 1 tablet by mouth Daily. 90 tablet 1   • traZODone (DESYREL) 100 MG tablet Take 100 mg by mouth Every Night.     • vitamin B-12 (CYANOCOBALAMIN) 1000 MCG tablet Take 3,000 mcg by mouth Daily.     • busPIRone (BUSPAR) 5 MG tablet Take 10 mg by mouth 2 (Two) Times a Day.       No current facility-administered medications on file prior to visit.        Allergies  No Known Allergies      Objective  Visit Vitals  /78   Pulse 75   Temp 98.4 °F (36.9 °C)   Resp 16   Ht 167.6 cm (66\")   Wt 81.6 kg (180 lb)   SpO2 100%   Breastfeeding No   BMI 29.05 kg/m²        Physical Exam  Physical Exam   Constitutional: She is oriented to person, place, and time. She appears well-developed and " well-nourished. No distress. She is obese.  HENT:   Head: Normocephalic and atraumatic.   Eyes: Conjunctivae are normal.   Cardiovascular: Normal rate, regular rhythm and normal heart sounds.   Pulmonary/Chest: Effort normal and breath sounds normal. No respiratory distress.   Musculoskeletal: She exhibits no edema.   Neurological: She is alert and oriented to person, place, and time.   Skin: Skin is warm and dry.   Approx 2in by 2in patch of skin on posterior left scalp without appreciable hair and 1 centimeter excoriation. No signs of infection.   Psychiatric: She has a normal mood and affect. Her behavior is normal.   Nursing note and vitals reviewed.      Results  Results for orders placed or performed in visit on 04/03/20   Basic metabolic panel   Result Value Ref Range    Glucose 139 (H) 65 - 99 mg/dL    BUN 15 6 - 20 mg/dL    Creatinine 0.90 0.57 - 1.00 mg/dL    Sodium 140 136 - 145 mmol/L    Potassium 4.2 3.5 - 5.2 mmol/L    Chloride 100 98 - 107 mmol/L    CO2 24.6 22.0 - 29.0 mmol/L    Calcium 9.8 8.6 - 10.5 mg/dL    eGFR Non African Amer 65 >60 mL/min/1.73    BUN/Creatinine Ratio 16.7 7.0 - 25.0    Anion Gap 15.4 (H) 5.0 - 15.0 mmol/L        Assessment and Plan  Laura was seen today for hypertension.    Diagnoses and all orders for this visit:    Essential hypertension  - well controlled in office despite high readings at home. Suspect some component of increased stress.  - will continue losartan 50mg daily with ongoing monitoring.  - BMP today    Bilateral carpal tunnel syndrome  - Did not improve with 1 month of nocturnal bracing which may have worsened pain in her arms and hands  - Will refer to hand surgery for injections or carpal tunnel release    Alcoholism (CMS/HCC)  - Continues to maintain sobriety on antabuse, seeing psychiatrist/therapist, daily meetings  - On thiamine, folate. Will continue until supply runs out.    Anxiety and depression  - Following with [facility], on  Rexulti, clonidine, prozac, trazodone qhs, and buspar BID however has not had this.  - Has had increase in stress and anxiety due to recent trip to [place] and would benefit from Buspar  - She will call psychiatrist about missing buspar    Alopecia  - Global thinning of hair with focal loss of hair over prior healed scalp wound. Discussed this hair may not return as scalp follicles may be permanently damaged.  - She can continue current shampoos and serums and should stop picking at healing excoriation.    Other orders  -     Shingrix Vaccine    Health Maintenance  - Pap smear: Reports normal pap smear 12/2019, will need to refer to GYN closer to 12/2020  - Mammogram: Scheduled 6/19/2020  - Colonoscopy: 3/2020, repeat 10y  - Immunizations: Flu UTD. Hep A #1 2/2020. Shingrix complete. Tdap 2012.  - HCV: Reports negative screening  - Depression screening: Diagnosed and treated for depression.        Return in about 3 months (around 8/27/2020) for Follow up, Labs today.

## 2020-06-03 ENCOUNTER — OFFICE VISIT (OUTPATIENT)
Dept: INTERNAL MEDICINE | Facility: CLINIC | Age: 55
End: 2020-06-03

## 2020-06-03 ENCOUNTER — LAB (OUTPATIENT)
Dept: LAB | Facility: HOSPITAL | Age: 55
End: 2020-06-03

## 2020-06-03 VITALS
BODY MASS INDEX: 30.25 KG/M2 | SYSTOLIC BLOOD PRESSURE: 150 MMHG | OXYGEN SATURATION: 96 % | DIASTOLIC BLOOD PRESSURE: 80 MMHG | HEART RATE: 65 BPM | HEIGHT: 66 IN | WEIGHT: 188.2 LBS

## 2020-06-03 DIAGNOSIS — F10.20 ALCOHOLISM (HCC): ICD-10-CM

## 2020-06-03 DIAGNOSIS — G56.03 BILATERAL CARPAL TUNNEL SYNDROME: Primary | ICD-10-CM

## 2020-06-03 DIAGNOSIS — G62.9 NEUROPATHY: ICD-10-CM

## 2020-06-03 PROCEDURE — 82746 ASSAY OF FOLIC ACID SERUM: CPT | Performed by: INTERNAL MEDICINE

## 2020-06-03 PROCEDURE — 84425 ASSAY OF VITAMIN B-1: CPT | Performed by: INTERNAL MEDICINE

## 2020-06-03 PROCEDURE — 99213 OFFICE O/P EST LOW 20 MIN: CPT | Performed by: INTERNAL MEDICINE

## 2020-06-03 PROCEDURE — 96372 THER/PROPH/DIAG INJ SC/IM: CPT | Performed by: INTERNAL MEDICINE

## 2020-06-03 PROCEDURE — 82607 VITAMIN B-12: CPT | Performed by: INTERNAL MEDICINE

## 2020-06-03 RX ORDER — METHYLPREDNISOLONE ACETATE 40 MG/ML
40 INJECTION, SUSPENSION INTRA-ARTICULAR; INTRALESIONAL; INTRAMUSCULAR; SOFT TISSUE ONCE
Status: COMPLETED | OUTPATIENT
Start: 2020-06-03 | End: 2020-06-03

## 2020-06-03 RX ORDER — PREDNISONE 10 MG/1
TABLET ORAL
Qty: 21 TABLET | Refills: 0 | Status: SHIPPED | OUTPATIENT
Start: 2020-06-03 | End: 2020-01-01

## 2020-06-03 RX ADMIN — METHYLPREDNISOLONE ACETATE 40 MG: 40 INJECTION, SUSPENSION INTRA-ARTICULAR; INTRALESIONAL; INTRAMUSCULAR; SOFT TISSUE at 10:45

## 2020-06-03 NOTE — PROGRESS NOTES
Answers for HPI/ROS submitted by the patient on 6/3/2020   What is the primary reason for your visit?: Other  Please describe your symptoms.: Pain and numbness in hands  Have you had these symptoms before?: Yes  How long have you been having these symptoms?: Greater than 2 weeks  Please describe any probable cause for these symptoms. : Alcoholic neuropathy  Internal Medicine Acute Visit    Chief Complaint   Patient presents with   • Numbness     pain and numbness        HPI  Ms. Jang comes in today for evaluation of her bilateral hand and forearm neuropathy. She reports symptoms recently have worsened. She has been having more pain and numbness. This has been waking her from sleep. Worst on the right as it was before. Still not wearing wrist extension braces as she initially thought these had made the pain worse but even without wearing them pain has not improved. She worries about alcohol polyneuropathy given her hx of alcohol abuse. Has spoken to someone with Dr. Hartmann's office who is reviewing her EMG/NCS before scheduling her appointment. She leaves next week for vacation and requests some relief.       Review of Systems  Review of Systems   Constitutional: Negative.    Musculoskeletal: Positive for myalgias.   Neurological: Positive for numbness. Negative for weakness.   Psychiatric/Behavioral: Positive for sleep disturbance.        Medications  Current Outpatient Medications on File Prior to Visit   Medication Sig Dispense Refill   • acyclovir (ZOVIRAX) 200 MG capsule Take  by mouth Every 4 (Four) Hours While Awake. 400mg the first day and 200mg for 4 days.     • acyclovir (ZOVIRAX) 5 % cream Apply  topically to the appropriate area as directed Every 3 (Three) Hours.     • B Complex-Biotin-FA (B-100 B-COMPLEX PO) Take  by mouth Daily.     • BIOTIN 5000 PO Take 5,000 mcg by mouth Daily.     • Brexpiprazole (Rexulti) 0.5 MG tablet Take 0.5 mg by mouth Daily.     • busPIRone (BUSPAR) 5 MG tablet Take 10 mg  "by mouth 2 (Two) Times a Day.     • cetirizine (zyrTEC) 10 MG tablet Take 10 mg by mouth Daily.     • Cholecalciferol (VITAMIN D3) 125 MCG (5000 UT) capsule capsule Take 5,000 Units by mouth Daily.     • cloNIDine (CATAPRES) 0.1 MG tablet Take 0.1 mg by mouth Daily.     • disulfiram (ANTABUSE) 500 MG tablet tablet Take 500 mg by mouth Daily.     • ferrous sulfate 324 (65 Fe) MG tablet delayed-release EC tablet Take 324 mg by mouth 2 (Two) Times a Day.     • FLUoxetine (PROzac) 20 MG capsule Take 20 mg by mouth Daily.     • folic acid (FOLVITE) 1 MG tablet Take 1 tablet by mouth Daily. 90 tablet 1   • losartan (COZAAR) 50 MG tablet Take 1 tablet by mouth Daily. 90 tablet 0   • magnesium, as, gluconate (MAGONATE) 500 (27 Mg) MG tablet Take 125 mg by mouth 2 (Two) Times a Day.     • Multiple Vitamins-Minerals (HAIR SKIN AND NAILS FORMULA PO) Take 2,500 mcg by mouth Daily.     • Multiple Vitamins-Minerals (MULTIVITAMIN GUMMIES WOMENS) chewable tablet Chew 50 mg Daily.     • thiamine (VITAMIN B-1) 100 MG tablet Take 1 tablet by mouth Daily. 90 tablet 1   • traZODone (DESYREL) 100 MG tablet Take 100 mg by mouth Every Night.     • vitamin B-12 (CYANOCOBALAMIN) 1000 MCG tablet Take 3,000 mcg by mouth Daily.       No current facility-administered medications on file prior to visit.         Allergies  No Known Allergies    PMH  Past Medical History:   Diagnosis Date   • Alcohol abuse    • Anxiety    • Arthritis    • Degenerative disc disease, lumbar    • Depression    • Hypertension        Objective  Visit Vitals  /80   Pulse 65   Ht 167.6 cm (66\")   Wt 85.4 kg (188 lb 3.2 oz)   SpO2 96%   BMI 30.38 kg/m²        Physical Exam  Physical Exam   Constitutional: She is oriented to person, place, and time. She appears well-developed and well-nourished. No distress.   HENT:   Head: Normocephalic and atraumatic.   Eyes: Conjunctivae are normal.   Pulmonary/Chest: Effort normal. No respiratory distress.   Musculoskeletal: She " exhibits no edema.   Neurological: She is alert and oriented to person, place, and time. She has normal strength. She displays no atrophy and no tremor. Gait normal.   Skin: Skin is warm and dry.   Psychiatric: She has a normal mood and affect.   Nursing note and vitals reviewed.      Results  Results for orders placed or performed in visit on 05/27/20   Uric acid   Result Value Ref Range    Uric Acid 6.5 (H) 2.4 - 5.7 mg/dL   Basic metabolic panel   Result Value Ref Range    Glucose 99 65 - 99 mg/dL    BUN 19 6 - 20 mg/dL    Creatinine 0.92 0.57 - 1.00 mg/dL    Sodium 133 (L) 136 - 145 mmol/L    Potassium 5.1 3.5 - 5.2 mmol/L    Chloride 96 (L) 98 - 107 mmol/L    CO2 26.7 22.0 - 29.0 mmol/L    Calcium 10.0 8.6 - 10.5 mg/dL    eGFR Non African Amer 63 >60 mL/min/1.73    BUN/Creatinine Ratio 20.7 7.0 - 25.0    Anion Gap 10.3 5.0 - 15.0 mmol/L        Assessment and Plan  Laura was seen today for numbness.    Diagnoses and all orders for this visit:    Bilateral carpal tunnel syndrome  - Endorses progressive pain, numbness, tingling in forearms and hands  - moderate R and mild L CTS on EMG/NCS.   - Discussed that there was no finding of other peripheral neuropathy on EMG/NCS including alcoholic polyneuropathy however will check thiamine level at her request, empirically on supplementation. Will also check B12 and folate to rule out other potential causes of her pain. Also empirically on B12 and folate supplement.  - Advised to resume nocturnal wrist extension bracing and given methyprednisolone injection in office. Prednisone 20mg daily x7d, 10mg daily x7d sent to pharmacy. Advised this will give temporary relief.  - Has been referred to hand surgeon, awaiting appt    Alcoholism (CMS/[facility])  - Continues to maintain sobriety on antabuse, seeing psychiatrist/therapist, daily meetings  - On thiamine, folate, B complex. Vitamin levels ordered as above.    Health Maintenance  - Pap smear: Reports normal pap smear 12/2019,  will need to refer to GYN closer to 12/2020  - Mammogram: Scheduled 6/19/2020  - Colonoscopy: 3/2020, repeat 10y  - Immunizations: Flu UTD. Hep A #1 2/2020. Shingrix complete. Tdap 2012.  - HCV: Reports negative screening  - Depression screening: Diagnosed and treated for depression.      Return in about 3 months (around 8/28/2020) for Follow up as already scheduled, Labs today.

## 2020-06-04 ENCOUNTER — LAB (OUTPATIENT)
Dept: LAB | Facility: HOSPITAL | Age: 55
End: 2020-06-04

## 2020-06-04 DIAGNOSIS — Z86.39 HISTORY OF IRON DEFICIENCY: Primary | ICD-10-CM

## 2020-06-04 LAB
FOLATE SERPL-MCNC: 16.9 NG/ML (ref 4.78–24.2)
VIT B12 BLD-MCNC: 1987 PG/ML (ref 211–946)

## 2020-06-04 PROCEDURE — 84207 ASSAY OF VITAMIN B-6: CPT | Performed by: INTERNAL MEDICINE

## 2020-06-04 PROCEDURE — 36415 COLL VENOUS BLD VENIPUNCTURE: CPT

## 2020-06-08 LAB — VIT B6 SERPL-MCNC: 106.6 UG/L (ref 2–32.8)

## 2020-06-09 LAB — VIT B1 BLD-SCNC: 225.2 NMOL/L (ref 66.5–200)

## 2020-07-10 NOTE — TELEPHONE ENCOUNTER
"SW reached out to pt to follow up. Pt stated \"Thanks to you I am 5 months sober\".  Pt stated she has utilized the resources that were given to her and stated she has been going to [employer] meetings (zoom meetings on Thursday evenings) and [employer] meetings (everyday at noon). Pt stated she was proud of herself and how far she has come. SW was proud of pt as well. SW asked if pt needed any other resources at this time and pt stated \"no but thank you for your help\".   "

## 2020-07-10 NOTE — OUTREACH NOTE
"Patient Outreach Note    SW reached out to pt to follow up. Pt stated \"Thanks to you I am 5 months sober\".  Pt stated she has utilized the resources that were given to her and stated she has been going to [employer] meetings (zoom meetings on Thursday evenings) and [employer] meetings (everyday at noon). Pt stated she was proud of herself and how far she has come. SW was proud of pt as well. SW asked if pt needed any other resources at this time and pt stated \"no but thank you for your help\".       TESSIE Doyle  Ambulatory     7/10/2020, 10:29      "

## 2020-08-28 NOTE — PROGRESS NOTES
Internal Medicine Follow Up    Chief Complaint  Laura Jang is a 55 y.o. female who presents today for follow up of chronic medical conditions outlined below.    Chief Complaint   Patient presents with   • Hypertension     3 month follow up        HPI  Ms. Jang comes in today with her sister. She notes that she has been sober x6 months up until yesterday. She does not want to talk much about this but is upset with herself. Her sister notes that she noticed a change in her starting Wednesday and confronted her yesterday. She reports last drink around 10pm but still feels drunk. She did fall coming down the stairs but did not suffer injury or hit her head. She has a bruise from hitting her head on the car door but was sober at the time. She is not driving. She stopped antabuse, it did not work. She is now on naltrexone but worries about side effects and if it will work for her. She did talk to her therapist last night but did not raise this concern. She is now using doxepin for sleep, off trazodone. BP is not checked regularly, about once a month. Runs high around 150/80. She continues to take losartan 50mg daily. She had bilateral CTR (L 8/6 and R 6/30). Pain relief has been significant. She notes acne and would like something for relief.       Review of Systems  Review of Systems   Constitutional: Negative for fever.   Respiratory: Negative for shortness of breath.    Cardiovascular: Negative for chest pain.   Gastrointestinal: Negative.    Musculoskeletal: Positive for gait problem.   Skin: Positive for bruise.        Acne   Neurological: Positive for dizziness. Negative for numbness (carpal tunnel improved).   Psychiatric/Behavioral: Positive for dysphoric mood, sleep disturbance, depressed mood and stress.        Current Medications  Current Outpatient Medications on File Prior to Visit   Medication Sig Dispense Refill   • acyclovir (ZOVIRAX) 200 MG capsule Take  by mouth Every 4 (Four) Hours While Awake. 400mg  the first day and 200mg for 4 days.     • acyclovir (ZOVIRAX) 5 % cream Apply  topically to the appropriate area as directed Every 3 (Three) Hours.     • BIOTIN 5000 PO Take 5,000 mcg by mouth Daily.     • Brexpiprazole (Rexulti) 0.5 MG tablet Take 0.5 mg by mouth Daily.     • busPIRone (BUSPAR) 10 MG tablet Take 10 mg by mouth 2 (two) times a day.     • cetirizine (zyrTEC) 10 MG tablet Take 10 mg by mouth Daily.     • Cholecalciferol (VITAMIN D3) 125 MCG (5000 UT) capsule capsule Take 5,000 Units by mouth Daily.     • cloNIDine (CATAPRES) 0.1 MG tablet Take 0.1 mg by mouth Daily.     • doxepin (SINEquan) 10 MG capsule Take 10 mg by mouth Every Night.     • FLUoxetine (PROzac) 20 MG capsule Take 20 mg by mouth Daily.     • folic acid (FOLVITE) 1 MG tablet Take 1 tablet by mouth Daily. 90 tablet 1   • magnesium, as, gluconate (MAGONATE) 500 (27 Mg) MG tablet Take 125 mg by mouth 2 (Two) Times a Day.     • Multiple Vitamins-Minerals (HAIR SKIN AND NAILS FORMULA PO) Take 2,500 mcg by mouth Daily.     • Multiple Vitamins-Minerals (MULTIVITAMIN GUMMIES WOMENS) chewable tablet Chew 50 mg Daily.     • naltrexone (DEPADE) 50 MG tablet      • [DISCONTINUED] busPIRone (BUSPAR) 5 MG tablet Take 10 mg by mouth 2 (Two) Times a Day.     • [DISCONTINUED] disulfiram (ANTABUSE) 250 MG tablet      • [DISCONTINUED] disulfiram (ANTABUSE) 500 MG tablet tablet Take 500 mg by mouth Daily.     • [DISCONTINUED] losartan (COZAAR) 50 MG tablet Take 1 tablet by mouth Daily. 22 tablet 0   • [DISCONTINUED] oxyCODONE-acetaminophen (PERCOCET) 5-325 MG per tablet Take 1 tablet by mouth Every 6 (Six) Hours As Needed. 15 tablet 0   • [DISCONTINUED] thiamine (VITAMIN B-1) 100 MG tablet Take 1 tablet by mouth Daily. 90 tablet 1   • B Complex-Biotin-FA (B-100 B-COMPLEX PO) Take  by mouth Daily.     • vitamin B-12 (CYANOCOBALAMIN) 1000 MCG tablet Take 3,000 mcg by mouth Daily.     • [DISCONTINUED] ferrous sulfate 324 (65 Fe) MG tablet delayed-release EC  "tablet Take 324 mg by mouth 2 (Two) Times a Day.     • [DISCONTINUED] traZODone (DESYREL) 100 MG tablet Take 100 mg by mouth Every Night.       No current facility-administered medications on file prior to visit.        Allergies  No Known Allergies    Objective  Visit Vitals  /80   Pulse 116   Temp 98.2 °F (36.8 °C)   Resp 16   Ht 167.6 cm (65.98\")   Wt 93.1 kg (205 lb 3.2 oz)   SpO2 94%   BMI 33.14 kg/m²        Physical Exam  Physical Exam   Constitutional: She is oriented to person, place, and time. She appears well-developed and well-nourished. She appears distressed (emotionally distressed).   HENT:   Head: Normocephalic and atraumatic.   Right Ear: External ear normal.   Left Ear: External ear normal.   Eyes: Conjunctivae are normal.   Cardiovascular: Normal rate, regular rhythm and normal heart sounds.   Pulmonary/Chest: Effort normal and breath sounds normal. No respiratory distress.   Musculoskeletal: She exhibits no edema.   Neurological: She is alert and oriented to person, place, and time. Gait (ambulated from office without issue) normal.   Skin: Skin is warm and dry.   Acne on forehead and chin   Psychiatric: She exhibits a depressed mood.   Nursing note and vitals reviewed.      Results  Results for orders placed or performed in visit on 08/03/20   COVID-19,[facility], NP SWAB IN [facility] MEDIA 24-30 HR TAT - Swab, Nasopharynx   Result Value Ref Range    Reference Lab Report      COVID19 Not Detected Not Detected - Ref. Range        Assessment and Plan  Laura was seen today for hypertension.    Diagnoses and all orders for this visit:    Essential hypertension  - well controlled in office despite high readings at home. Suspect some component of increased stress, caffeine use.  - will continue losartan 50mg daily and requested more frequent home monitoring.  - CMP today    Bilateral carpal tunnel syndrome  - moderate R and mild L CTS on EMG/NCS.  - s/p bilateral CTR by Dr. Hartmann with " improvement in symptoms.    Alcoholism (CMS/HCC)  - Following with psychiatry and therapy and now on naltrexone. Had been on antabuse but reports this did not cause anticipated effect so discontinued.  - Has great family support, living with her sister.  - Had been sober x6 months but began drinking yesterday. Trigger unclear, she did not want to discuss.  - Hesitant to continue naltrexone due to concern for side effects. Discussed common side effects of naltrexone today. Recommended she discuss concerns she has with medication with her prescribing psychiatrist as well.  - CMP and CBC today    Acne vulgaris  - Mild acne on forehead and chin. Recommended gentle cleanser such as Neutrogena or Cetaphil BID along with benzaclin gel daily.    Health Maintenance  - Pap smear: Reports normal pap smear 12/2019, will need to refer to GYN closer to 12/2020  - Mammogram: Scheduled 6/19/2020  - Colonoscopy: 3/2020, repeat 10y  - Immunizations: Hep A #1 2/2020. Shingrix complete. Tdap 2012.  - HCV: Reports negative screening  - Depression screening: Diagnosed and treated for depression.     Return in about 6 weeks (around 10/9/2020) for Follow up HTN 30 minutes, Labs today.

## 2020-08-29 PROBLEM — T14.8XXA BLISTERING OF SKIN: Status: RESOLVED | Noted: 2020-03-01 | Resolved: 2020-01-01

## 2020-08-31 NOTE — TELEPHONE ENCOUNTER
PATIENT CALLED AND SAID SHE WAS 6 MONTHS SOBER.  SHE SAID SHE HAS TO FLY OUT TO [place], AND NEEDS A NOTE SO SHE CAN TAKE YOUR CAT AS A SERVICE/THERAPY ANIMAL.  SHE DOESN'T KNOW HOW LONG SHE IS GOING TO BE IN [place].  SHE IS ANXIOUS ABOUT FLYING SOBER.  PATIENT WAS CRYING AND VERY UPSET.    PLEASE CALL PATIENT TO DISCUSS.    CALLBACK:  [phone]

## 2020-08-31 NOTE — TELEPHONE ENCOUNTER
Spoke to Pt to let her know to speak to her psych dr about documentation. PT voiced understanding.

## 2020-08-31 NOTE — TELEPHONE ENCOUNTER
I do not think that I am able to provide this documentation. I would recommend that she discuss this with her psychiatrist.

## 2021-01-01 ENCOUNTER — APPOINTMENT (OUTPATIENT)
Dept: CT IMAGING | Facility: HOSPITAL | Age: 56
End: 2021-01-01

## 2021-01-01 ENCOUNTER — APPOINTMENT (OUTPATIENT)
Dept: GENERAL RADIOLOGY | Facility: HOSPITAL | Age: 56
End: 2021-01-01

## 2021-01-01 ENCOUNTER — PATIENT MESSAGE (OUTPATIENT)
Dept: INTERNAL MEDICINE | Facility: CLINIC | Age: 56
End: 2021-01-01

## 2021-01-01 ENCOUNTER — APPOINTMENT (OUTPATIENT)
Dept: CARDIOLOGY | Facility: HOSPITAL | Age: 56
End: 2021-01-01

## 2021-01-01 ENCOUNTER — APPOINTMENT (OUTPATIENT)
Dept: NEPHROLOGY | Facility: HOSPITAL | Age: 56
End: 2021-01-01

## 2021-01-01 ENCOUNTER — OFFICE VISIT (OUTPATIENT)
Dept: ORTHOPEDIC SURGERY | Facility: CLINIC | Age: 56
End: 2021-01-01

## 2021-01-01 ENCOUNTER — HOSPITAL ENCOUNTER (INPATIENT)
Facility: HOSPITAL | Age: 56
LOS: 4 days | End: 2021-06-12
Attending: INTERNAL MEDICINE | Admitting: INTERNAL MEDICINE

## 2021-01-01 ENCOUNTER — HOSPITAL ENCOUNTER (OUTPATIENT)
Dept: GENERAL RADIOLOGY | Facility: HOSPITAL | Age: 56
Discharge: HOME | End: 2021-01-06
Admitting: INTERNAL MEDICINE

## 2021-01-01 ENCOUNTER — HOSPITAL ENCOUNTER (INPATIENT)
Facility: HOSPITAL | Age: 56
LOS: 7 days | End: 2021-06-08
Attending: EMERGENCY MEDICINE | Admitting: INTERNAL MEDICINE

## 2021-01-01 ENCOUNTER — APPOINTMENT (OUTPATIENT)
Dept: INTERVENTIONAL RADIOLOGY/VASCULAR | Facility: HOSPITAL | Age: 56
End: 2021-01-01

## 2021-01-01 ENCOUNTER — HOSPITAL ENCOUNTER (OUTPATIENT)
Dept: BONE DENSITY | Facility: HOSPITAL | Age: 56
Discharge: HOME | End: 2021-02-25
Admitting: ORTHOPAEDIC SURGERY

## 2021-01-01 ENCOUNTER — APPOINTMENT (OUTPATIENT)
Dept: ULTRASOUND IMAGING | Facility: HOSPITAL | Age: 56
End: 2021-01-01

## 2021-01-01 VITALS — HEART RATE: 117 BPM | HEIGHT: 66 IN | OXYGEN SATURATION: 100 % | BODY MASS INDEX: 32.02 KG/M2 | WEIGHT: 199.2 LBS

## 2021-01-01 VITALS
BODY MASS INDEX: 34.16 KG/M2 | OXYGEN SATURATION: 92 % | WEIGHT: 205 LBS | TEMPERATURE: 97.7 F | SYSTOLIC BLOOD PRESSURE: 100 MMHG | HEART RATE: 92 BPM | RESPIRATION RATE: 28 BRPM | HEIGHT: 65 IN | DIASTOLIC BLOOD PRESSURE: 44 MMHG

## 2021-01-01 VITALS
TEMPERATURE: 98 F | WEIGHT: 205 LBS | BODY MASS INDEX: 34.16 KG/M2 | DIASTOLIC BLOOD PRESSURE: 66 MMHG | HEART RATE: 94 BPM | RESPIRATION RATE: 26 BRPM | OXYGEN SATURATION: 94 % | HEIGHT: 65 IN | SYSTOLIC BLOOD PRESSURE: 90 MMHG

## 2021-01-01 VITALS
DIASTOLIC BLOOD PRESSURE: 94 MMHG | HEIGHT: 66 IN | BODY MASS INDEX: 32.88 KG/M2 | HEART RATE: 110 BPM | SYSTOLIC BLOOD PRESSURE: 170 MMHG | WEIGHT: 204.6 LBS

## 2021-01-01 VITALS — BODY MASS INDEX: 32.03 KG/M2 | WEIGHT: 199.3 LBS | HEART RATE: 85 BPM | HEIGHT: 66 IN | OXYGEN SATURATION: 97 %

## 2021-01-01 DIAGNOSIS — Z09 FRACTURE FOLLOW-UP: Primary | ICD-10-CM

## 2021-01-01 DIAGNOSIS — D64.9 ANEMIA, UNSPECIFIED TYPE: ICD-10-CM

## 2021-01-01 DIAGNOSIS — F10.20 ALCOHOLISM (HCC): ICD-10-CM

## 2021-01-01 DIAGNOSIS — S92.352D CLOSED DISPLACED FRACTURE OF FIFTH METATARSAL BONE OF LEFT FOOT WITH ROUTINE HEALING, SUBSEQUENT ENCOUNTER: ICD-10-CM

## 2021-01-01 DIAGNOSIS — S92.352A DISPLACED FRACTURE OF FIFTH METATARSAL BONE, LEFT FOOT, INITIAL ENCOUNTER FOR CLOSED FRACTURE: ICD-10-CM

## 2021-01-01 DIAGNOSIS — S92.352A DISPLACED FRACTURE OF FIFTH METATARSAL BONE, LEFT FOOT, INITIAL ENCOUNTER FOR CLOSED FRACTURE: Primary | ICD-10-CM

## 2021-01-01 DIAGNOSIS — S92.355A CLOSED NONDISPLACED FRACTURE OF FIFTH METATARSAL BONE OF LEFT FOOT, INITIAL ENCOUNTER: Primary | ICD-10-CM

## 2021-01-01 DIAGNOSIS — I10 HYPERTENSION, UNSPECIFIED TYPE: ICD-10-CM

## 2021-01-01 DIAGNOSIS — N17.9 ACUTE RENAL FAILURE, UNSPECIFIED ACUTE RENAL FAILURE TYPE (HCC): Primary | ICD-10-CM

## 2021-01-01 DIAGNOSIS — M25.472 PAIN AND SWELLING OF LEFT ANKLE: ICD-10-CM

## 2021-01-01 DIAGNOSIS — E87.6 HYPOKALEMIA: ICD-10-CM

## 2021-01-01 DIAGNOSIS — M25.572 PAIN AND SWELLING OF LEFT ANKLE: Primary | ICD-10-CM

## 2021-01-01 DIAGNOSIS — M25.572 PAIN AND SWELLING OF LEFT ANKLE: ICD-10-CM

## 2021-01-01 DIAGNOSIS — M25.472 PAIN AND SWELLING OF LEFT ANKLE: Primary | ICD-10-CM

## 2021-01-01 DIAGNOSIS — Z09 FRACTURE FOLLOW-UP: ICD-10-CM

## 2021-01-01 DIAGNOSIS — E87.1 HYPONATREMIA: ICD-10-CM

## 2021-01-01 DIAGNOSIS — I10 ESSENTIAL HYPERTENSION: ICD-10-CM

## 2021-01-01 LAB
ABO GROUP BLD: NORMAL
ABO GROUP BLD: NORMAL
ALBUMIN SERPL-MCNC: 2.7 G/DL (ref 3.5–5.2)
ALBUMIN SERPL-MCNC: 2.7 G/DL (ref 3.5–5.2)
ALBUMIN SERPL-MCNC: 2.9 G/DL (ref 3.5–5.2)
ALBUMIN SERPL-MCNC: 2.9 G/DL (ref 3.5–5.2)
ALBUMIN SERPL-MCNC: 3 G/DL (ref 3.5–5.2)
ALBUMIN SERPL-MCNC: 3.1 G/DL (ref 3.5–5.2)
ALBUMIN SERPL-MCNC: 3.1 G/DL (ref 3.5–5.2)
ALBUMIN SERPL-MCNC: 3.3 G/DL (ref 3.5–5.2)
ALBUMIN SERPL-MCNC: 3.6 G/DL (ref 3.5–5.2)
ALBUMIN SERPL-MCNC: 3.6 G/DL (ref 3.5–5.2)
ALBUMIN/GLOB SERPL: 1.2 G/DL
ALBUMIN/GLOB SERPL: 1.3 G/DL
ALBUMIN/GLOB SERPL: 1.7 G/DL
ALBUMIN/GLOB SERPL: 1.9 G/DL
ALP SERPL-CCNC: 101 U/L (ref 39–117)
ALP SERPL-CCNC: 102 U/L (ref 39–117)
ALP SERPL-CCNC: 111 U/L (ref 39–117)
ALP SERPL-CCNC: 116 U/L (ref 39–117)
ALP SERPL-CCNC: 139 U/L (ref 39–117)
ALP SERPL-CCNC: 147 U/L (ref 39–117)
ALP SERPL-CCNC: 231 U/L (ref 39–117)
ALP SERPL-CCNC: 247 U/L (ref 39–117)
ALP SERPL-CCNC: 247 U/L (ref 39–117)
ALT SERPL W P-5'-P-CCNC: 208 U/L (ref 1–33)
ALT SERPL W P-5'-P-CCNC: 260 U/L (ref 1–33)
ALT SERPL W P-5'-P-CCNC: 349 U/L (ref 1–33)
ALT SERPL W P-5'-P-CCNC: 385 U/L (ref 1–33)
ALT SERPL W P-5'-P-CCNC: 436 U/L (ref 1–33)
ALT SERPL W P-5'-P-CCNC: 449 U/L (ref 1–33)
ALT SERPL W P-5'-P-CCNC: 453 U/L (ref 1–33)
ALT SERPL W P-5'-P-CCNC: 454 U/L (ref 1–33)
ALT SERPL W P-5'-P-CCNC: 510 U/L (ref 1–33)
AMMONIA BLD-SCNC: 120 UMOL/L (ref 11–51)
AMMONIA BLD-SCNC: 56 UMOL/L (ref 11–51)
AMMONIA BLD-SCNC: 58 UMOL/L (ref 11–51)
AMMONIA BLD-SCNC: 70 UMOL/L (ref 11–51)
AMPHET+METHAMPHET UR QL: NEGATIVE
AMPHETAMINES UR QL: NEGATIVE
AMYLASE SERPL-CCNC: 56 U/L (ref 28–100)
ANION GAP SERPL CALCULATED.3IONS-SCNC: 14 MMOL/L (ref 5–15)
ANION GAP SERPL CALCULATED.3IONS-SCNC: 14 MMOL/L (ref 5–15)
ANION GAP SERPL CALCULATED.3IONS-SCNC: 15 MMOL/L (ref 5–15)
ANION GAP SERPL CALCULATED.3IONS-SCNC: 16 MMOL/L (ref 5–15)
ANION GAP SERPL CALCULATED.3IONS-SCNC: 16 MMOL/L (ref 5–15)
ANION GAP SERPL CALCULATED.3IONS-SCNC: 18 MMOL/L (ref 5–15)
ANION GAP SERPL CALCULATED.3IONS-SCNC: 20 MMOL/L (ref 5–15)
ANION GAP SERPL CALCULATED.3IONS-SCNC: 22 MMOL/L (ref 5–15)
ANION GAP SERPL CALCULATED.3IONS-SCNC: 25 MMOL/L (ref 5–15)
ANION GAP SERPL CALCULATED.3IONS-SCNC: 26 MMOL/L (ref 5–15)
ANION GAP SERPL CALCULATED.3IONS-SCNC: 27 MMOL/L (ref 5–15)
ANION GAP SERPL CALCULATED.3IONS-SCNC: 30 MMOL/L (ref 5–15)
APAP SERPL-MCNC: <5 MCG/ML (ref 0–30)
APTT PPP: 40 SECONDS (ref 22–39)
APTT PPP: 44.4 SECONDS (ref 22–39)
APTT PPP: 46.7 SECONDS (ref 22–39)
ARTERIAL PATENCY WRIST A: ABNORMAL
ASCENDING AORTA: 2.7 CM
AST SERPL-CCNC: 1690 U/L (ref 1–32)
AST SERPL-CCNC: 1789 U/L (ref 1–32)
AST SERPL-CCNC: 2016 U/L (ref 1–32)
AST SERPL-CCNC: 2023 U/L (ref 1–32)
AST SERPL-CCNC: 2211 U/L (ref 1–32)
AST SERPL-CCNC: 2392 U/L (ref 1–32)
AST SERPL-CCNC: 368 U/L (ref 1–32)
AST SERPL-CCNC: 488 U/L (ref 1–32)
AST SERPL-CCNC: 971 U/L (ref 1–32)
ATMOSPHERIC PRESS: ABNORMAL MM[HG]
BACTERIA SPEC AEROBE CULT: ABNORMAL
BACTERIA SPEC AEROBE CULT: ABNORMAL
BACTERIA SPEC AEROBE CULT: NORMAL
BACTERIA UR QL AUTO: ABNORMAL /HPF
BARBITURATES UR QL SCN: NEGATIVE
BASE EXCESS BLDA CALC-SCNC: -1.2 MMOL/L (ref 0–2)
BASE EXCESS BLDA CALC-SCNC: -1.3 MMOL/L (ref 0–2)
BASE EXCESS BLDA CALC-SCNC: -7.9 MMOL/L (ref 0–2)
BASOPHILS # BLD MANUAL: 0 10*3/MM3 (ref 0–0.2)
BASOPHILS NFR BLD AUTO: 0 % (ref 0–1.5)
BDY SITE: ABNORMAL
BENZODIAZ UR QL SCN: NEGATIVE
BH BB BLOOD EXPIRATION DATE: NORMAL
BH BB BLOOD EXPIRATION DATE: NORMAL
BH BB BLOOD TYPE BARCODE: 9500
BH BB BLOOD TYPE BARCODE: 9500
BH BB DISPENSE STATUS: NORMAL
BH BB DISPENSE STATUS: NORMAL
BH BB PRODUCT CODE: NORMAL
BH BB PRODUCT CODE: NORMAL
BH BB UNIT NUMBER: NORMAL
BH BB UNIT NUMBER: NORMAL
BH CV ECHO LEFT VENTRICLE BASAL CAVITARY GRADIENT: 42 MMHG
BH CV ECHO MEAS - AO MAX PG (FULL): 20.6 MMHG
BH CV ECHO MEAS - AO MAX PG: 40 MMHG
BH CV ECHO MEAS - AO MEAN PG (FULL): 9 MMHG
BH CV ECHO MEAS - AO MEAN PG: 20 MMHG
BH CV ECHO MEAS - AO ROOT AREA (BSA CORRECTED): 1.4
BH CV ECHO MEAS - AO ROOT AREA: 6.2 CM^2
BH CV ECHO MEAS - AO ROOT DIAM: 2.8 CM
BH CV ECHO MEAS - AO V2 MAX: 316 CM/SEC
BH CV ECHO MEAS - AO V2 MEAN: 201 CM/SEC
BH CV ECHO MEAS - AO V2 VTI: 51.1 CM
BH CV ECHO MEAS - ASC AORTA: 2.7 CM
BH CV ECHO MEAS - AVA(I,A): 1.8 CM^2
BH CV ECHO MEAS - AVA(I,D): 1.8 CM^2
BH CV ECHO MEAS - AVA(V,A): 1.8 CM^2
BH CV ECHO MEAS - AVA(V,D): 1.8 CM^2
BH CV ECHO MEAS - BSA(HAYCOCK): 2.1 M^2
BH CV ECHO MEAS - BSA: 2 M^2
BH CV ECHO MEAS - BZI_BMI: 34.1 KILOGRAMS/M^2
BH CV ECHO MEAS - BZI_METRIC_HEIGHT: 165.1 CM
BH CV ECHO MEAS - BZI_METRIC_WEIGHT: 93 KG
BH CV ECHO MEAS - EDV(CUBED): 64 ML
BH CV ECHO MEAS - EDV(MOD-SP2): 34.6 ML
BH CV ECHO MEAS - EDV(MOD-SP4): 62.6 ML
BH CV ECHO MEAS - EDV(TEICH): 70 ML
BH CV ECHO MEAS - EF(CUBED): 48.8 %
BH CV ECHO MEAS - EF(MOD-BP): 79.9 %
BH CV ECHO MEAS - EF(MOD-SP2): 71.6 %
BH CV ECHO MEAS - EF(MOD-SP4): 84.2 %
BH CV ECHO MEAS - EF(TEICH): 41.5 %
BH CV ECHO MEAS - ESV(CUBED): 32.8 ML
BH CV ECHO MEAS - ESV(MOD-SP2): 9.8 ML
BH CV ECHO MEAS - ESV(MOD-SP4): 9.9 ML
BH CV ECHO MEAS - ESV(TEICH): 41 ML
BH CV ECHO MEAS - FS: 20 %
BH CV ECHO MEAS - IVS/LVPW: 0.89
BH CV ECHO MEAS - IVSD: 0.8 CM
BH CV ECHO MEAS - LA DIMENSION: 3.4 CM
BH CV ECHO MEAS - LA/AO: 1.2
BH CV ECHO MEAS - LAD MAJOR: 7.8 CM
BH CV ECHO MEAS - LAT PEAK E' VEL: 10.8 CM/SEC
BH CV ECHO MEAS - LATERAL E/E' RATIO: 12.3
BH CV ECHO MEAS - LV DIASTOLIC VOL/BSA (35-75): 31.3 ML/M^2
BH CV ECHO MEAS - LV IVRT: 0.09 SEC
BH CV ECHO MEAS - LV MASS(C)D: 101.4 GRAMS
BH CV ECHO MEAS - LV MASS(C)DI: 50.7 GRAMS/M^2
BH CV ECHO MEAS - LV MAX PG: 19.4 MMHG
BH CV ECHO MEAS - LV MEAN PG: 11 MMHG
BH CV ECHO MEAS - LV SYSTOLIC VOL/BSA (12-30): 4.9 ML/M^2
BH CV ECHO MEAS - LV V1 MAX: 220 CM/SEC
BH CV ECHO MEAS - LV V1 MEAN: 150 CM/SEC
BH CV ECHO MEAS - LV V1 VTI: 36.1 CM
BH CV ECHO MEAS - LVIDD: 4 CM
BH CV ECHO MEAS - LVIDS: 3.2 CM
BH CV ECHO MEAS - LVLD AP2: 6.5 CM
BH CV ECHO MEAS - LVLD AP4: 7.3 CM
BH CV ECHO MEAS - LVLS AP2: 4.7 CM
BH CV ECHO MEAS - LVLS AP4: 4.9 CM
BH CV ECHO MEAS - LVOT AREA (M): 2.5 CM^2
BH CV ECHO MEAS - LVOT AREA: 2.5 CM^2
BH CV ECHO MEAS - LVOT DIAM: 1.8 CM
BH CV ECHO MEAS - LVPWD: 0.9 CM
BH CV ECHO MEAS - MED PEAK E' VEL: 7.7 CM/SEC
BH CV ECHO MEAS - MEDIAL E/E' RATIO: 17.2
BH CV ECHO MEAS - MV A MAX VEL: 152 CM/SEC
BH CV ECHO MEAS - MV DEC SLOPE: 657 CM/SEC^2
BH CV ECHO MEAS - MV DEC TIME: 0.24 SEC
BH CV ECHO MEAS - MV E MAX VEL: 133 CM/SEC
BH CV ECHO MEAS - MV E/A: 0.88
BH CV ECHO MEAS - MV MAX PG: 11.8 MMHG
BH CV ECHO MEAS - MV MEAN PG: 6 MMHG
BH CV ECHO MEAS - MV P1/2T MAX VEL: 172 CM/SEC
BH CV ECHO MEAS - MV P1/2T: 76.7 MSEC
BH CV ECHO MEAS - MV V2 MAX: 172 CM/SEC
BH CV ECHO MEAS - MV V2 MEAN: 113 CM/SEC
BH CV ECHO MEAS - MV V2 VTI: 40.8 CM
BH CV ECHO MEAS - MVA P1/2T LCG: 1.3 CM^2
BH CV ECHO MEAS - MVA(P1/2T): 2.9 CM^2
BH CV ECHO MEAS - MVA(VTI): 2.3 CM^2
BH CV ECHO MEAS - PA ACC TIME: 0.11 SEC
BH CV ECHO MEAS - PA MAX PG: 12.8 MMHG
BH CV ECHO MEAS - PA PR(ACCEL): 31.3 MMHG
BH CV ECHO MEAS - PA V2 MAX: 179 CM/SEC
BH CV ECHO MEAS - RAP SYSTOLE: 8 MMHG
BH CV ECHO MEAS - RVSP: 47 MMHG
BH CV ECHO MEAS - SI(AO): 157.4 ML/M^2
BH CV ECHO MEAS - SI(CUBED): 15.6 ML/M^2
BH CV ECHO MEAS - SI(LVOT): 46 ML/M^2
BH CV ECHO MEAS - SI(MOD-SP2): 12.4 ML/M^2
BH CV ECHO MEAS - SI(MOD-SP4): 26.4 ML/M^2
BH CV ECHO MEAS - SI(TEICH): 14.5 ML/M^2
BH CV ECHO MEAS - SV(AO): 314.6 ML
BH CV ECHO MEAS - SV(CUBED): 31.2 ML
BH CV ECHO MEAS - SV(LVOT): 91.9 ML
BH CV ECHO MEAS - SV(MOD-SP2): 24.8 ML
BH CV ECHO MEAS - SV(MOD-SP4): 52.7 ML
BH CV ECHO MEAS - SV(TEICH): 29 ML
BH CV ECHO MEAS - TAPSE (>1.6): 2 CM
BH CV ECHO MEAS - TR MAX PG: 38 MMHG
BH CV ECHO MEAS - TR MAX VEL: 309 CM/SEC
BH CV ECHO MEASUREMENTS AVERAGE E/E' RATIO: 14.38
BH CV VAS BP LEFT ARM: NORMAL MMHG
BH CV XLRA - RV BASE: 2.9 CM
BH CV XLRA - RV LENGTH: 5.2 CM
BH CV XLRA - RV MID: 2.7 CM
BH CV XLRA - TDI S': 17.1 CM/SEC
BILIRUB CONJ SERPL-MCNC: 7.5 MG/DL (ref 0–0.3)
BILIRUB CONJ SERPL-MCNC: 8.6 MG/DL (ref 0–0.3)
BILIRUB INDIRECT SERPL-MCNC: 2 MG/DL
BILIRUB SERPL-MCNC: 10 MG/DL (ref 0–1.2)
BILIRUB SERPL-MCNC: 10.1 MG/DL (ref 0–1.2)
BILIRUB SERPL-MCNC: 10.4 MG/DL (ref 0–1.2)
BILIRUB SERPL-MCNC: 10.5 MG/DL (ref 0–1.2)
BILIRUB SERPL-MCNC: 10.6 MG/DL (ref 0–1.2)
BILIRUB SERPL-MCNC: 6.3 MG/DL (ref 0–1.2)
BILIRUB SERPL-MCNC: 6.7 MG/DL (ref 0–1.2)
BILIRUB SERPL-MCNC: 8.6 MG/DL (ref 0–1.2)
BILIRUB SERPL-MCNC: 9.2 MG/DL (ref 0–1.2)
BILIRUB UR QL STRIP: ABNORMAL
BLD GP AB SCN SERPL QL: NEGATIVE
BODY TEMPERATURE: 37 C
BUN SERPL-MCNC: 18 MG/DL (ref 6–20)
BUN SERPL-MCNC: 18 MG/DL (ref 6–20)
BUN SERPL-MCNC: 19 MG/DL (ref 6–20)
BUN SERPL-MCNC: 19 MG/DL (ref 6–20)
BUN SERPL-MCNC: 20 MG/DL (ref 6–20)
BUN SERPL-MCNC: 20 MG/DL (ref 6–20)
BUN SERPL-MCNC: 21 MG/DL (ref 6–20)
BUN SERPL-MCNC: 22 MG/DL (ref 6–20)
BUN SERPL-MCNC: 23 MG/DL (ref 6–20)
BUN SERPL-MCNC: 24 MG/DL (ref 6–20)
BUN SERPL-MCNC: 28 MG/DL (ref 6–20)
BUN SERPL-MCNC: 34 MG/DL (ref 6–20)
BUN SERPL-MCNC: 39 MG/DL (ref 6–20)
BUN SERPL-MCNC: 57 MG/DL (ref 6–20)
BUN SERPL-MCNC: 74 MG/DL (ref 6–20)
BUN/CREAT SERPL: 13.5 (ref 7–25)
BUN/CREAT SERPL: 4 (ref 7–25)
BUN/CREAT SERPL: 4.1 (ref 7–25)
BUN/CREAT SERPL: 4.2 (ref 7–25)
BUN/CREAT SERPL: 4.3 (ref 7–25)
BUN/CREAT SERPL: 4.4 (ref 7–25)
BUN/CREAT SERPL: 4.5 (ref 7–25)
BUN/CREAT SERPL: 4.8 (ref 7–25)
BUN/CREAT SERPL: 6 (ref 7–25)
BUN/CREAT SERPL: 7.9 (ref 7–25)
BUN/CREAT SERPL: 9 (ref 7–25)
BUPRENORPHINE SERPL-MCNC: NEGATIVE NG/ML
BURR CELLS BLD QL SMEAR: ABNORMAL
C DIFF TOX GENS STL QL NAA+PROBE: NOT DETECTED
CA-I SERPL ISE-MCNC: 0.93 MMOL/L (ref 1.12–1.32)
CA-I SERPL ISE-MCNC: 1.03 MMOL/L (ref 1.12–1.32)
CALCIUM SPEC-SCNC: 8 MG/DL (ref 8.6–10.5)
CALCIUM SPEC-SCNC: 8 MG/DL (ref 8.6–10.5)
CALCIUM SPEC-SCNC: 8.1 MG/DL (ref 8.6–10.5)
CALCIUM SPEC-SCNC: 8.3 MG/DL (ref 8.6–10.5)
CALCIUM SPEC-SCNC: 8.3 MG/DL (ref 8.6–10.5)
CALCIUM SPEC-SCNC: 8.4 MG/DL (ref 8.6–10.5)
CALCIUM SPEC-SCNC: 8.4 MG/DL (ref 8.6–10.5)
CALCIUM SPEC-SCNC: 8.6 MG/DL (ref 8.6–10.5)
CALCIUM SPEC-SCNC: 8.7 MG/DL (ref 8.6–10.5)
CALCIUM SPEC-SCNC: 8.8 MG/DL (ref 8.6–10.5)
CALCIUM SPEC-SCNC: 8.8 MG/DL (ref 8.6–10.5)
CALCIUM SPEC-SCNC: 8.9 MG/DL (ref 8.6–10.5)
CALCIUM SPEC-SCNC: 9.2 MG/DL (ref 8.6–10.5)
CALCIUM SPEC-SCNC: 9.2 MG/DL (ref 8.6–10.5)
CANNABINOIDS SERPL QL: NEGATIVE
CHLORIDE SERPL-SCNC: 101 MMOL/L (ref 98–107)
CHLORIDE SERPL-SCNC: 64 MMOL/L (ref 98–107)
CHLORIDE SERPL-SCNC: 68 MMOL/L (ref 98–107)
CHLORIDE SERPL-SCNC: 69 MMOL/L (ref 98–107)
CHLORIDE SERPL-SCNC: 70 MMOL/L (ref 98–107)
CHLORIDE SERPL-SCNC: 71 MMOL/L (ref 98–107)
CHLORIDE SERPL-SCNC: 72 MMOL/L (ref 98–107)
CHLORIDE SERPL-SCNC: 72 MMOL/L (ref 98–107)
CHLORIDE SERPL-SCNC: 74 MMOL/L (ref 98–107)
CHLORIDE SERPL-SCNC: 75 MMOL/L (ref 98–107)
CHLORIDE SERPL-SCNC: 76 MMOL/L (ref 98–107)
CHLORIDE SERPL-SCNC: 78 MMOL/L (ref 98–107)
CHLORIDE SERPL-SCNC: 82 MMOL/L (ref 98–107)
CHLORIDE SERPL-SCNC: 88 MMOL/L (ref 98–107)
CHLORIDE SERPL-SCNC: 92 MMOL/L (ref 98–107)
CHLORIDE SERPL-SCNC: 95 MMOL/L (ref 98–107)
CHLORIDE SERPL-SCNC: 99 MMOL/L (ref 98–107)
CHOLEST SERPL-MCNC: 52 MG/DL (ref 0–200)
CHOLEST SERPL-MCNC: 53 MG/DL (ref 0–200)
CK SERPL-CCNC: 668 U/L (ref 20–180)
CLARITY UR: ABNORMAL
CLUMPED PLATELETS: ABNORMAL
CO2 BLDA-SCNC: 16.3 MMOL/L (ref 22–33)
CO2 BLDA-SCNC: 23.1 MMOL/L (ref 22–33)
CO2 BLDA-SCNC: 23.2 MMOL/L (ref 22–33)
CO2 SERPL-SCNC: 12 MMOL/L (ref 22–29)
CO2 SERPL-SCNC: 14 MMOL/L (ref 22–29)
CO2 SERPL-SCNC: 15 MMOL/L (ref 22–29)
CO2 SERPL-SCNC: 15 MMOL/L (ref 22–29)
CO2 SERPL-SCNC: 16 MMOL/L (ref 22–29)
CO2 SERPL-SCNC: 19 MMOL/L (ref 22–29)
CO2 SERPL-SCNC: 20 MMOL/L (ref 22–29)
CO2 SERPL-SCNC: 20 MMOL/L (ref 22–29)
CO2 SERPL-SCNC: 21 MMOL/L (ref 22–29)
CO2 SERPL-SCNC: 21 MMOL/L (ref 22–29)
CO2 SERPL-SCNC: 22 MMOL/L (ref 22–29)
CO2 SERPL-SCNC: 22 MMOL/L (ref 22–29)
COCAINE UR QL: NEGATIVE
COHGB MFR BLD: 0.6 % (ref 0–2)
COHGB MFR BLD: 0.6 % (ref 0–2)
COHGB MFR BLD: 1.1 % (ref 0–2)
COLOR UR: ABNORMAL
CORTIS SERPL-MCNC: 65.14 MCG/DL
CREAT SERPL-MCNC: 4.21 MG/DL (ref 0.57–1)
CREAT SERPL-MCNC: 4.22 MG/DL (ref 0.57–1)
CREAT SERPL-MCNC: 4.31 MG/DL (ref 0.57–1)
CREAT SERPL-MCNC: 4.31 MG/DL (ref 0.57–1)
CREAT SERPL-MCNC: 4.48 MG/DL (ref 0.57–1)
CREAT SERPL-MCNC: 4.52 MG/DL (ref 0.57–1)
CREAT SERPL-MCNC: 4.57 MG/DL (ref 0.57–1)
CREAT SERPL-MCNC: 4.58 MG/DL (ref 0.57–1)
CREAT SERPL-MCNC: 4.59 MG/DL (ref 0.57–1)
CREAT SERPL-MCNC: 4.69 MG/DL (ref 0.57–1)
CREAT SERPL-MCNC: 4.73 MG/DL (ref 0.57–1)
CREAT SERPL-MCNC: 4.81 MG/DL (ref 0.57–1)
CREAT SERPL-MCNC: 4.84 MG/DL (ref 0.57–1)
CREAT SERPL-MCNC: 4.85 MG/DL (ref 0.57–1)
CREAT SERPL-MCNC: 4.92 MG/DL (ref 0.57–1)
CREAT SERPL-MCNC: 4.94 MG/DL (ref 0.57–1)
CREAT SERPL-MCNC: 4.97 MG/DL (ref 0.57–1)
CROSSMATCH INTERPRETATION: NORMAL
CROSSMATCH INTERPRETATION: NORMAL
CRP SERPL-MCNC: 5.05 MG/DL (ref 0–0.5)
CRP SERPL-MCNC: 5.65 MG/DL (ref 0–0.5)
D-LACTATE SERPL-SCNC: 2 MMOL/L (ref 0.5–2)
D-LACTATE SERPL-SCNC: 2.8 MMOL/L (ref 0.5–2)
D-LACTATE SERPL-SCNC: 3.7 MMOL/L (ref 0.5–2)
D-LACTATE SERPL-SCNC: 5.9 MMOL/L (ref 0.5–2)
D-LACTATE SERPL-SCNC: 8.7 MMOL/L (ref 0.5–2)
D-LACTATE SERPL-SCNC: 9.1 MMOL/L (ref 0.5–2)
D-LACTATE SERPL-SCNC: 9.7 MMOL/L (ref 0.5–2)
DEPRECATED RDW RBC AUTO: 55.8 FL (ref 37–54)
DEPRECATED RDW RBC AUTO: 56.9 FL (ref 37–54)
DEPRECATED RDW RBC AUTO: 60.1 FL (ref 37–54)
DEPRECATED RDW RBC AUTO: 60.3 FL (ref 37–54)
DEPRECATED RDW RBC AUTO: 62.2 FL (ref 37–54)
DEPRECATED RDW RBC AUTO: 63.8 FL (ref 37–54)
DEPRECATED RDW RBC AUTO: 68.9 FL (ref 37–54)
DEVELOPER EXPIRATION DATE: NORMAL
DEVELOPER LOT NUMBER: NORMAL
EOSINOPHIL # BLD MANUAL: 0 10*3/MM3 (ref 0–0.4)
EOSINOPHIL # BLD MANUAL: 0 10*3/MM3 (ref 0–0.4)
EOSINOPHIL # BLD MANUAL: 0.11 10*3/MM3 (ref 0–0.4)
EOSINOPHIL # BLD MANUAL: 0.52 10*3/MM3 (ref 0–0.4)
EOSINOPHIL # BLD MANUAL: 0.57 10*3/MM3 (ref 0–0.4)
EOSINOPHIL # BLD MANUAL: 0.8 10*3/MM3 (ref 0–0.4)
EOSINOPHIL # BLD MANUAL: 0.93 10*3/MM3 (ref 0–0.4)
EOSINOPHIL NFR BLD MANUAL: 0 % (ref 0.3–6.2)
EOSINOPHIL NFR BLD MANUAL: 0 % (ref 0.3–6.2)
EOSINOPHIL NFR BLD MANUAL: 1 % (ref 0.3–6.2)
EOSINOPHIL NFR BLD MANUAL: 2 % (ref 0.3–6.2)
EOSINOPHIL NFR BLD MANUAL: 2 % (ref 0.3–6.2)
EOSINOPHIL NFR BLD MANUAL: 4 % (ref 0.3–6.2)
EOSINOPHIL NFR BLD MANUAL: 6 % (ref 0.3–6.2)
EPAP: 0
EPAP: 6
ERYTHROCYTE [DISTWIDTH] IN BLOOD BY AUTOMATED COUNT: 15.1 % (ref 12.3–15.4)
ERYTHROCYTE [DISTWIDTH] IN BLOOD BY AUTOMATED COUNT: 15.4 % (ref 12.3–15.4)
ERYTHROCYTE [DISTWIDTH] IN BLOOD BY AUTOMATED COUNT: 17.2 % (ref 12.3–15.4)
ERYTHROCYTE [DISTWIDTH] IN BLOOD BY AUTOMATED COUNT: 17.3 % (ref 12.3–15.4)
ERYTHROCYTE [DISTWIDTH] IN BLOOD BY AUTOMATED COUNT: 17.9 % (ref 12.3–15.4)
ERYTHROCYTE [DISTWIDTH] IN BLOOD BY AUTOMATED COUNT: 18.6 % (ref 12.3–15.4)
ERYTHROCYTE [DISTWIDTH] IN BLOOD BY AUTOMATED COUNT: 20.4 % (ref 12.3–15.4)
ERYTHROCYTE [SEDIMENTATION RATE] IN BLOOD: 5 MM/HR (ref 0–30)
EST. AVERAGE GLUCOSE BLD GHB EST-MCNC: 85 MG/DL
ETHANOL BLD-MCNC: <10 MG/DL (ref 0–10)
EXPIRATION DATE: NORMAL
FECAL OCCULT BLOOD SCREEN, POC: NEGATIVE
GFR SERPL CREATININE-BSD FRML MDRD: 10 ML/MIN/1.73
GFR SERPL CREATININE-BSD FRML MDRD: 11 ML/MIN/1.73
GFR SERPL CREATININE-BSD FRML MDRD: 9 ML/MIN/1.73
GFR SERPL CREATININE-BSD FRML MDRD: ABNORMAL ML/MIN/{1.73_M2}
GLOBULIN UR ELPH-MCNC: 1.9 GM/DL
GLOBULIN UR ELPH-MCNC: 1.9 GM/DL
GLOBULIN UR ELPH-MCNC: 2.2 GM/DL
GLOBULIN UR ELPH-MCNC: 2.3 GM/DL
GLOBULIN UR ELPH-MCNC: 2.3 GM/DL
GLOBULIN UR ELPH-MCNC: 2.5 GM/DL
GLUCOSE BLDC GLUCOMTR-MCNC: 100 MG/DL (ref 70–130)
GLUCOSE BLDC GLUCOMTR-MCNC: 105 MG/DL (ref 70–130)
GLUCOSE BLDC GLUCOMTR-MCNC: 106 MG/DL (ref 70–130)
GLUCOSE BLDC GLUCOMTR-MCNC: 107 MG/DL (ref 70–130)
GLUCOSE BLDC GLUCOMTR-MCNC: 111 MG/DL (ref 70–130)
GLUCOSE BLDC GLUCOMTR-MCNC: 111 MG/DL (ref 70–130)
GLUCOSE BLDC GLUCOMTR-MCNC: 114 MG/DL (ref 70–130)
GLUCOSE BLDC GLUCOMTR-MCNC: 119 MG/DL (ref 70–130)
GLUCOSE BLDC GLUCOMTR-MCNC: 123 MG/DL (ref 70–130)
GLUCOSE BLDC GLUCOMTR-MCNC: 129 MG/DL (ref 70–130)
GLUCOSE BLDC GLUCOMTR-MCNC: 132 MG/DL (ref 70–130)
GLUCOSE BLDC GLUCOMTR-MCNC: 135 MG/DL (ref 70–130)
GLUCOSE BLDC GLUCOMTR-MCNC: 136 MG/DL (ref 70–130)
GLUCOSE BLDC GLUCOMTR-MCNC: 139 MG/DL (ref 70–130)
GLUCOSE BLDC GLUCOMTR-MCNC: 150 MG/DL (ref 70–130)
GLUCOSE BLDC GLUCOMTR-MCNC: 151 MG/DL (ref 70–130)
GLUCOSE BLDC GLUCOMTR-MCNC: 151 MG/DL (ref 70–130)
GLUCOSE BLDC GLUCOMTR-MCNC: 153 MG/DL (ref 70–130)
GLUCOSE BLDC GLUCOMTR-MCNC: 157 MG/DL (ref 70–130)
GLUCOSE BLDC GLUCOMTR-MCNC: 158 MG/DL (ref 70–130)
GLUCOSE BLDC GLUCOMTR-MCNC: 161 MG/DL (ref 70–130)
GLUCOSE BLDC GLUCOMTR-MCNC: 176 MG/DL (ref 70–130)
GLUCOSE BLDC GLUCOMTR-MCNC: 39 MG/DL (ref 70–130)
GLUCOSE BLDC GLUCOMTR-MCNC: 42 MG/DL (ref 70–130)
GLUCOSE BLDC GLUCOMTR-MCNC: 61 MG/DL (ref 70–130)
GLUCOSE BLDC GLUCOMTR-MCNC: 78 MG/DL (ref 70–130)
GLUCOSE BLDC GLUCOMTR-MCNC: 80 MG/DL (ref 70–130)
GLUCOSE BLDC GLUCOMTR-MCNC: 83 MG/DL (ref 70–130)
GLUCOSE BLDC GLUCOMTR-MCNC: 86 MG/DL (ref 70–130)
GLUCOSE BLDC GLUCOMTR-MCNC: 93 MG/DL (ref 70–130)
GLUCOSE BLDC GLUCOMTR-MCNC: 93 MG/DL (ref 70–130)
GLUCOSE BLDC GLUCOMTR-MCNC: 98 MG/DL (ref 70–130)
GLUCOSE BLDC GLUCOMTR-MCNC: 98 MG/DL (ref 70–130)
GLUCOSE SERPL-MCNC: 102 MG/DL (ref 65–99)
GLUCOSE SERPL-MCNC: 103 MG/DL (ref 65–99)
GLUCOSE SERPL-MCNC: 112 MG/DL (ref 65–99)
GLUCOSE SERPL-MCNC: 119 MG/DL (ref 65–99)
GLUCOSE SERPL-MCNC: 122 MG/DL (ref 65–99)
GLUCOSE SERPL-MCNC: 123 MG/DL (ref 65–99)
GLUCOSE SERPL-MCNC: 124 MG/DL (ref 65–99)
GLUCOSE SERPL-MCNC: 126 MG/DL (ref 65–99)
GLUCOSE SERPL-MCNC: 128 MG/DL (ref 65–99)
GLUCOSE SERPL-MCNC: 130 MG/DL (ref 65–99)
GLUCOSE SERPL-MCNC: 181 MG/DL (ref 65–99)
GLUCOSE SERPL-MCNC: 49 MG/DL (ref 65–99)
GLUCOSE SERPL-MCNC: 59 MG/DL (ref 65–99)
GLUCOSE SERPL-MCNC: 69 MG/DL (ref 65–99)
GLUCOSE SERPL-MCNC: 80 MG/DL (ref 65–99)
GLUCOSE SERPL-MCNC: 82 MG/DL (ref 65–99)
GLUCOSE SERPL-MCNC: 93 MG/DL (ref 65–99)
GLUCOSE UR STRIP-MCNC: NEGATIVE MG/DL
GRAM STN SPEC: ABNORMAL
HAV AB SER QL IA: POSITIVE
HAV IGM SERPL QL IA: NORMAL
HAV IGM SERPL QL IA: NORMAL
HBA1C MFR BLD: 4.6 % (ref 4.8–5.6)
HBV CORE IGM SERPL QL IA: NORMAL
HBV CORE IGM SERPL QL IA: NORMAL
HBV SURFACE AB SER-ACNC: <3.1 MIU/ML
HBV SURFACE AG SERPL QL IA: NORMAL
HBV SURFACE AG SERPL QL IA: NORMAL
HCO3 BLDA-SCNC: 15.5 MMOL/L (ref 20–26)
HCO3 BLDA-SCNC: 22.1 MMOL/L (ref 20–26)
HCO3 BLDA-SCNC: 22.2 MMOL/L (ref 20–26)
HCT VFR BLD AUTO: 22 % (ref 34–46.6)
HCT VFR BLD AUTO: 25 % (ref 34–46.6)
HCT VFR BLD AUTO: 28.5 % (ref 34–46.6)
HCT VFR BLD AUTO: 30.1 % (ref 34–46.6)
HCT VFR BLD AUTO: 30.4 % (ref 34–46.6)
HCT VFR BLD AUTO: 30.8 % (ref 34–46.6)
HCT VFR BLD AUTO: 31.1 % (ref 34–46.6)
HCT VFR BLD AUTO: 31.6 % (ref 34–46.6)
HCT VFR BLD AUTO: 32.3 % (ref 34–46.6)
HCT VFR BLD AUTO: 32.6 % (ref 34–46.6)
HCT VFR BLD CALC: 24.6 %
HCT VFR BLD CALC: 31.6 %
HCT VFR BLD CALC: 34.9 %
HCV AB SER DONR QL: NORMAL
HCV AB SER DONR QL: NORMAL
HGB BLD-MCNC: 10.2 G/DL (ref 12–15.9)
HGB BLD-MCNC: 10.5 G/DL (ref 12–15.9)
HGB BLD-MCNC: 10.6 G/DL (ref 12–15.9)
HGB BLD-MCNC: 10.6 G/DL (ref 12–15.9)
HGB BLD-MCNC: 10.8 G/DL (ref 12–15.9)
HGB BLD-MCNC: 11 G/DL (ref 12–15.9)
HGB BLD-MCNC: 11 G/DL (ref 12–15.9)
HGB BLD-MCNC: 7.5 G/DL (ref 12–15.9)
HGB BLD-MCNC: 8.6 G/DL (ref 12–15.9)
HGB BLD-MCNC: 9.7 G/DL (ref 12–15.9)
HGB BLDA-MCNC: 10.3 G/DL (ref 14–18)
HGB BLDA-MCNC: 11.4 G/DL (ref 14–18)
HGB BLDA-MCNC: 8 G/DL (ref 14–18)
HGB UR QL STRIP.AUTO: ABNORMAL
HIV1+2 AB SER QL: NORMAL
HOLD SPECIMEN: NORMAL
HYALINE CASTS UR QL AUTO: ABNORMAL /LPF
INHALED O2 CONCENTRATION: 21 %
INHALED O2 CONCENTRATION: 21 %
INHALED O2 CONCENTRATION: 28 %
INR PPP: 1.52 (ref 0.85–1.16)
INR PPP: 1.79 (ref 0.85–1.16)
INR PPP: 2.82 (ref 0.85–1.16)
INR PPP: 3.34 (ref 0.85–1.16)
INR PPP: 3.68 (ref 0.85–1.16)
IPAP: 0
IPAP: 12
IVRT: 90 MSEC
KETONES UR QL STRIP: ABNORMAL
LDH SERPL-CCNC: 1033 U/L (ref 135–214)
LEFT ATRIUM VOLUME INDEX: 37.4 ML/M2
LEUKOCYTE ESTERASE UR QL STRIP.AUTO: ABNORMAL
LIPASE SERPL-CCNC: 74 U/L (ref 13–60)
LYMPHOCYTES # BLD MANUAL: 0.91 10*3/MM3 (ref 0.7–3.1)
LYMPHOCYTES # BLD MANUAL: 1.04 10*3/MM3 (ref 0.7–3.1)
LYMPHOCYTES # BLD MANUAL: 1.2 10*3/MM3 (ref 0.7–3.1)
LYMPHOCYTES # BLD MANUAL: 1.42 10*3/MM3 (ref 0.7–3.1)
LYMPHOCYTES # BLD MANUAL: 2.83 10*3/MM3 (ref 0.7–3.1)
LYMPHOCYTES # BLD MANUAL: 3.88 10*3/MM3 (ref 0.7–3.1)
LYMPHOCYTES # BLD MANUAL: 3.94 10*3/MM3 (ref 0.7–3.1)
LYMPHOCYTES NFR BLD MANUAL: 10 % (ref 19.6–45.3)
LYMPHOCYTES NFR BLD MANUAL: 10 % (ref 19.6–45.3)
LYMPHOCYTES NFR BLD MANUAL: 10 % (ref 5–12)
LYMPHOCYTES NFR BLD MANUAL: 13 % (ref 19.6–45.3)
LYMPHOCYTES NFR BLD MANUAL: 13 % (ref 5–12)
LYMPHOCYTES NFR BLD MANUAL: 15 % (ref 19.6–45.3)
LYMPHOCYTES NFR BLD MANUAL: 15 % (ref 5–12)
LYMPHOCYTES NFR BLD MANUAL: 16 % (ref 5–12)
LYMPHOCYTES NFR BLD MANUAL: 17 % (ref 19.6–45.3)
LYMPHOCYTES NFR BLD MANUAL: 6 % (ref 5–12)
LYMPHOCYTES NFR BLD MANUAL: 6 % (ref 5–12)
LYMPHOCYTES NFR BLD MANUAL: 7 % (ref 5–12)
LYMPHOCYTES NFR BLD MANUAL: 8 % (ref 19.6–45.3)
LYMPHOCYTES NFR BLD MANUAL: 9 % (ref 19.6–45.3)
Lab: NORMAL
MAGNESIUM SERPL-MCNC: 1.5 MG/DL (ref 1.6–2.6)
MAGNESIUM SERPL-MCNC: 1.6 MG/DL (ref 1.6–2.6)
MAGNESIUM SERPL-MCNC: 1.9 MG/DL (ref 1.6–2.6)
MAGNESIUM SERPL-MCNC: 2 MG/DL (ref 1.6–2.6)
MAGNESIUM SERPL-MCNC: 2.3 MG/DL (ref 1.6–2.6)
MAGNESIUM SERPL-MCNC: 2.3 MG/DL (ref 1.6–2.6)
MAGNESIUM SERPL-MCNC: 2.4 MG/DL (ref 1.6–2.6)
MAGNESIUM SERPL-MCNC: 2.5 MG/DL (ref 1.6–2.6)
MAGNESIUM SERPL-MCNC: 4.4 MG/DL (ref 1.6–2.6)
MCH RBC QN AUTO: 33.4 PG (ref 26.6–33)
MCH RBC QN AUTO: 33.4 PG (ref 26.6–33)
MCH RBC QN AUTO: 33.7 PG (ref 26.6–33)
MCH RBC QN AUTO: 33.7 PG (ref 26.6–33)
MCH RBC QN AUTO: 34.4 PG (ref 26.6–33)
MCH RBC QN AUTO: 35.1 PG (ref 26.6–33)
MCH RBC QN AUTO: 35.5 PG (ref 26.6–33)
MCHC RBC AUTO-ENTMCNC: 34 G/DL (ref 31.5–35.7)
MCHC RBC AUTO-ENTMCNC: 34.1 G/DL (ref 31.5–35.7)
MCHC RBC AUTO-ENTMCNC: 34.4 G/DL (ref 31.5–35.7)
MCHC RBC AUTO-ENTMCNC: 34.4 G/DL (ref 31.5–35.7)
MCHC RBC AUTO-ENTMCNC: 34.8 G/DL (ref 31.5–35.7)
MCV RBC AUTO: 100.9 FL (ref 79–97)
MCV RBC AUTO: 102 FL (ref 79–97)
MCV RBC AUTO: 104.3 FL (ref 79–97)
MCV RBC AUTO: 96.9 FL (ref 79–97)
MCV RBC AUTO: 97.8 FL (ref 79–97)
MCV RBC AUTO: 98.1 FL (ref 79–97)
MCV RBC AUTO: 98.3 FL (ref 79–97)
METAMYELOCYTES NFR BLD MANUAL: 1 % (ref 0–0)
METAMYELOCYTES NFR BLD MANUAL: 2 % (ref 0–0)
METAMYELOCYTES NFR BLD MANUAL: 4 % (ref 0–0)
METAMYELOCYTES NFR BLD MANUAL: 5 % (ref 0–0)
METAMYELOCYTES NFR BLD MANUAL: 5 % (ref 0–0)
METAMYELOCYTES NFR BLD MANUAL: 6 % (ref 0–0)
METHADONE UR QL SCN: NEGATIVE
METHGB BLD QL: 0 % (ref 0–1.5)
METHGB BLD QL: 0.3 % (ref 0–1.5)
METHGB BLD QL: 0.7 % (ref 0–1.5)
MODALITY: ABNORMAL
MONOCYTES # BLD AUTO: 0.78 10*3/MM3 (ref 0.1–0.9)
MONOCYTES # BLD AUTO: 1.37 10*3/MM3 (ref 0.1–0.9)
MONOCYTES # BLD AUTO: 1.62 10*3/MM3 (ref 0.1–0.9)
MONOCYTES # BLD AUTO: 1.7 10*3/MM3 (ref 0.1–0.9)
MONOCYTES # BLD AUTO: 1.73 10*3/MM3 (ref 0.1–0.9)
MONOCYTES # BLD AUTO: 1.75 10*3/MM3 (ref 0.1–0.9)
MONOCYTES # BLD AUTO: 2.59 10*3/MM3 (ref 0.1–0.9)
MRSA DNA SPEC QL NAA+PROBE: POSITIVE
MYELOCYTES NFR BLD MANUAL: 10 % (ref 0–0)
MYELOCYTES NFR BLD MANUAL: 12 % (ref 0–0)
MYELOCYTES NFR BLD MANUAL: 3 % (ref 0–0)
MYELOCYTES NFR BLD MANUAL: 5 % (ref 0–0)
NEGATIVE CONTROL: NEGATIVE
NEUTROPHILS # BLD AUTO: 11.15 10*3/MM3 (ref 1.7–7)
NEUTROPHILS # BLD AUTO: 13.2 10*3/MM3 (ref 1.7–7)
NEUTROPHILS # BLD AUTO: 14.48 10*3/MM3 (ref 1.7–7)
NEUTROPHILS # BLD AUTO: 20.11 10*3/MM3 (ref 1.7–7)
NEUTROPHILS # BLD AUTO: 6.48 10*3/MM3 (ref 1.7–7)
NEUTROPHILS # BLD AUTO: 7.56 10*3/MM3 (ref 1.7–7)
NEUTROPHILS # BLD AUTO: 8.9 10*3/MM3 (ref 1.7–7)
NEUTROPHILS NFR BLD MANUAL: 37 % (ref 42.7–76)
NEUTROPHILS NFR BLD MANUAL: 47 % (ref 42.7–76)
NEUTROPHILS NFR BLD MANUAL: 48 % (ref 42.7–76)
NEUTROPHILS NFR BLD MANUAL: 52 % (ref 42.7–76)
NEUTROPHILS NFR BLD MANUAL: 54 % (ref 42.7–76)
NEUTROPHILS NFR BLD MANUAL: 63 % (ref 42.7–76)
NEUTROPHILS NFR BLD MANUAL: 65 % (ref 42.7–76)
NEUTS BAND NFR BLD MANUAL: 17 % (ref 0–5)
NEUTS BAND NFR BLD MANUAL: 19 % (ref 0–5)
NEUTS BAND NFR BLD MANUAL: 21 % (ref 0–5)
NEUTS BAND NFR BLD MANUAL: 32 % (ref 0–5)
NEUTS BAND NFR BLD MANUAL: 5 % (ref 0–5)
NEUTS BAND NFR BLD MANUAL: 8 % (ref 0–5)
NEUTS BAND NFR BLD MANUAL: 9 % (ref 0–5)
NEUTS VAC BLD QL SMEAR: ABNORMAL
NEUTS VAC BLD QL SMEAR: ABNORMAL
NITRITE UR QL STRIP: POSITIVE
NOTE: ABNORMAL
NRBC SPEC MANUAL: 2 /100 WBC (ref 0–0.2)
NRBC SPEC MANUAL: 3 /100 WBC (ref 0–0.2)
NT-PROBNP SERPL-MCNC: 3736 PG/ML (ref 0–900)
NT-PROBNP SERPL-MCNC: 3990 PG/ML (ref 0–900)
NT-PROBNP SERPL-MCNC: ABNORMAL PG/ML (ref 0–900)
OPIATES UR QL: NEGATIVE
OSMOLALITY SERPL: 251 MOSM/KG (ref 275–295)
OSMOLALITY UR: 249 MOSM/KG (ref 300–1100)
OXYCODONE UR QL SCN: NEGATIVE
OXYHGB MFR BLDV: 94.1 % (ref 94–99)
OXYHGB MFR BLDV: 94.9 % (ref 94–99)
OXYHGB MFR BLDV: 97.8 % (ref 94–99)
PAW @ PEAK INSP FLOW SETTING VENT: 0 CMH2O
PAW @ PEAK INSP FLOW SETTING VENT: 0 CMH2O
PCO2 BLDA: 23.9 MM HG (ref 35–45)
PCO2 BLDA: 31.6 MM HG (ref 35–45)
PCO2 BLDA: 32.1 MM HG (ref 35–45)
PCO2 TEMP ADJ BLD: 23.9 MM HG (ref 35–45)
PCO2 TEMP ADJ BLD: 31.6 MM HG (ref 35–45)
PCO2 TEMP ADJ BLD: 32.1 MM HG (ref 35–45)
PCP UR QL SCN: NEGATIVE
PH BLDA: 7.42 PH UNITS (ref 7.35–7.45)
PH BLDA: 7.45 PH UNITS (ref 7.35–7.45)
PH BLDA: 7.46 PH UNITS (ref 7.35–7.45)
PH UR STRIP.AUTO: 5.5 [PH] (ref 5–8)
PH, TEMP CORRECTED: 7.42 PH UNITS
PH, TEMP CORRECTED: 7.45 PH UNITS
PH, TEMP CORRECTED: 7.46 PH UNITS
PHOSPHATE SERPL-MCNC: 0.5 MG/DL (ref 2.5–4.5)
PHOSPHATE SERPL-MCNC: 0.6 MG/DL (ref 2.5–4.5)
PHOSPHATE SERPL-MCNC: 1.6 MG/DL (ref 2.5–4.5)
PHOSPHATE SERPL-MCNC: 1.6 MG/DL (ref 2.5–4.5)
PHOSPHATE SERPL-MCNC: 1.7 MG/DL (ref 2.5–4.5)
PHOSPHATE SERPL-MCNC: 2 MG/DL (ref 2.5–4.5)
PHOSPHATE SERPL-MCNC: 3.1 MG/DL (ref 2.5–4.5)
PHOSPHATE SERPL-MCNC: 3.4 MG/DL (ref 2.5–4.5)
PHOSPHATE SERPL-MCNC: 4.3 MG/DL (ref 2.5–4.5)
PLAT MORPH BLD: NORMAL
PLATELET # BLD AUTO: 143 10*3/MM3 (ref 140–450)
PLATELET # BLD AUTO: 162 10*3/MM3 (ref 140–450)
PLATELET # BLD AUTO: 165 10*3/MM3 (ref 140–450)
PLATELET # BLD AUTO: 172 10*3/MM3 (ref 140–450)
PLATELET # BLD AUTO: 179 10*3/MM3 (ref 140–450)
PLATELET # BLD AUTO: 183 10*3/MM3 (ref 140–450)
PLATELET # BLD AUTO: 194 10*3/MM3 (ref 140–450)
PMV BLD AUTO: 10.8 FL (ref 6–12)
PMV BLD AUTO: 10.9 FL (ref 6–12)
PMV BLD AUTO: 10.9 FL (ref 6–12)
PMV BLD AUTO: 11 FL (ref 6–12)
PMV BLD AUTO: 11 FL (ref 6–12)
PMV BLD AUTO: 11.1 FL (ref 6–12)
PMV BLD AUTO: 11.1 FL (ref 6–12)
PO2 BLDA: 68.5 MM HG (ref 83–108)
PO2 BLDA: 80.5 MM HG (ref 83–108)
PO2 BLDA: 96.5 MM HG (ref 83–108)
PO2 TEMP ADJ BLD: 68.5 MM HG (ref 83–108)
PO2 TEMP ADJ BLD: 80.5 MM HG (ref 83–108)
PO2 TEMP ADJ BLD: 96.5 MM HG (ref 83–108)
POSITIVE CONTROL: POSITIVE
POTASSIUM SERPL-SCNC: 2.9 MMOL/L (ref 3.5–5.2)
POTASSIUM SERPL-SCNC: 2.9 MMOL/L (ref 3.5–5.2)
POTASSIUM SERPL-SCNC: 3 MMOL/L (ref 3.5–5.2)
POTASSIUM SERPL-SCNC: 3.1 MMOL/L (ref 3.5–5.2)
POTASSIUM SERPL-SCNC: 3.2 MMOL/L (ref 3.5–5.2)
POTASSIUM SERPL-SCNC: 3.3 MMOL/L (ref 3.5–5.2)
POTASSIUM SERPL-SCNC: 3.4 MMOL/L (ref 3.5–5.2)
POTASSIUM SERPL-SCNC: 3.5 MMOL/L (ref 3.5–5.2)
POTASSIUM SERPL-SCNC: 3.6 MMOL/L (ref 3.5–5.2)
POTASSIUM SERPL-SCNC: 3.7 MMOL/L (ref 3.5–5.2)
POTASSIUM SERPL-SCNC: 3.8 MMOL/L (ref 3.5–5.2)
PREALB SERPL-MCNC: 3 MG/DL (ref 20–40)
PREALB SERPL-MCNC: 4.2 MG/DL (ref 20–40)
PROCALCITONIN SERPL-MCNC: 2 NG/ML (ref 0–0.25)
PROCALCITONIN SERPL-MCNC: 2.1 NG/ML (ref 0–0.25)
PROCALCITONIN SERPL-MCNC: 2.89 NG/ML (ref 0–0.25)
PROCALCITONIN SERPL-MCNC: 3.31 NG/ML (ref 0–0.25)
PROPOXYPH UR QL: NEGATIVE
PROT SERPL-MCNC: 5 G/DL (ref 6–8.5)
PROT SERPL-MCNC: 5.1 G/DL (ref 6–8.5)
PROT SERPL-MCNC: 5.1 G/DL (ref 6–8.5)
PROT SERPL-MCNC: 5.2 G/DL (ref 6–8.5)
PROT SERPL-MCNC: 5.2 G/DL (ref 6–8.5)
PROT SERPL-MCNC: 5.4 G/DL (ref 6–8.5)
PROT SERPL-MCNC: 5.5 G/DL (ref 6–8.5)
PROT UR QL STRIP: ABNORMAL
PROTHROMBIN TIME: 17.7 SECONDS (ref 11.4–14.4)
PROTHROMBIN TIME: 20.1 SECONDS (ref 11.4–14.4)
PROTHROMBIN TIME: 28.4 SECONDS (ref 11.4–14.4)
PROTHROMBIN TIME: 32.4 SECONDS (ref 11.4–14.4)
PROTHROMBIN TIME: 34.9 SECONDS (ref 11.4–14.4)
QT INTERVAL: 398 MS
QT INTERVAL: 410 MS
QTC INTERVAL: 476 MS
QTC INTERVAL: 501 MS
RBC # BLD AUTO: 2.11 10*6/MM3 (ref 3.77–5.28)
RBC # BLD AUTO: 2.45 10*6/MM3 (ref 3.77–5.28)
RBC # BLD AUTO: 2.9 10*6/MM3 (ref 3.77–5.28)
RBC # BLD AUTO: 3.15 10*6/MM3 (ref 3.77–5.28)
RBC # BLD AUTO: 3.17 10*6/MM3 (ref 3.77–5.28)
RBC # BLD AUTO: 3.2 10*6/MM3 (ref 3.77–5.28)
RBC # BLD AUTO: 3.26 10*6/MM3 (ref 3.77–5.28)
RBC # UR: ABNORMAL /HPF
RBC MORPH BLD: NORMAL
REF LAB TEST METHOD: ABNORMAL
RH BLD: NEGATIVE
RH BLD: NEGATIVE
SALICYLATES SERPL-MCNC: 0.3 MG/DL
SARS-COV-2 RDRP RESP QL NAA+PROBE: NORMAL
SCAN SLIDE: NORMAL
SCAN SLIDE: NORMAL
SMUDGE CELLS BLD QL SMEAR: ABNORMAL
SODIUM SERPL-SCNC: 107 MMOL/L (ref 136–145)
SODIUM SERPL-SCNC: 109 MMOL/L (ref 136–145)
SODIUM SERPL-SCNC: 110 MMOL/L (ref 136–145)
SODIUM SERPL-SCNC: 111 MMOL/L (ref 136–145)
SODIUM SERPL-SCNC: 112 MMOL/L (ref 136–145)
SODIUM SERPL-SCNC: 113 MMOL/L (ref 136–145)
SODIUM SERPL-SCNC: 113 MMOL/L (ref 136–145)
SODIUM SERPL-SCNC: 114 MMOL/L (ref 136–145)
SODIUM SERPL-SCNC: 114 MMOL/L (ref 136–145)
SODIUM SERPL-SCNC: 116 MMOL/L (ref 136–145)
SODIUM SERPL-SCNC: 117 MMOL/L (ref 136–145)
SODIUM SERPL-SCNC: 117 MMOL/L (ref 136–145)
SODIUM SERPL-SCNC: 120 MMOL/L (ref 136–145)
SODIUM SERPL-SCNC: 120 MMOL/L (ref 136–145)
SODIUM SERPL-SCNC: 121 MMOL/L (ref 136–145)
SODIUM SERPL-SCNC: 122 MMOL/L (ref 136–145)
SODIUM SERPL-SCNC: 122 MMOL/L (ref 136–145)
SODIUM SERPL-SCNC: 125 MMOL/L (ref 136–145)
SODIUM SERPL-SCNC: 129 MMOL/L (ref 136–145)
SODIUM SERPL-SCNC: 133 MMOL/L (ref 136–145)
SODIUM SERPL-SCNC: 134 MMOL/L (ref 136–145)
SODIUM SERPL-SCNC: 135 MMOL/L (ref 136–145)
SODIUM UR-SCNC: <20 MMOL/L
SP GR UR STRIP: 1.01 (ref 1–1.03)
SQUAMOUS #/AREA URNS HPF: ABNORMAL /HPF
T&S EXPIRATION DATE: NORMAL
T4 FREE SERPL-MCNC: 1.21 NG/DL (ref 0.93–1.7)
TOTAL RATE: 0 BREATHS/MINUTE
TOTAL RATE: 0 BREATHS/MINUTE
TOXIC GRANULATION: ABNORMAL
TOXIC GRANULATION: ABNORMAL
TRICYCLICS UR QL SCN: NEGATIVE
TRIGL SERPL-MCNC: 84 MG/DL (ref 0–150)
TRIGL SERPL-MCNC: 88 MG/DL (ref 0–150)
TROPONIN T SERPL-MCNC: 0.02 NG/ML (ref 0–0.03)
TSH SERPL DL<=0.05 MIU/L-ACNC: 3.75 UIU/ML (ref 0.27–4.2)
TSH SERPL DL<=0.05 MIU/L-ACNC: 3.75 UIU/ML (ref 0.27–4.2)
UNIT  ABO: NORMAL
UNIT  ABO: NORMAL
UNIT  RH: NORMAL
UNIT  RH: NORMAL
UROBILINOGEN UR QL STRIP: ABNORMAL
VANCOMYCIN SERPL-MCNC: <4 MCG/ML (ref 5–40)
VENTILATOR MODE: ABNORMAL
VENTILATOR MODE: ABNORMAL
WBC # BLD AUTO: 10.95 10*3/MM3 (ref 3.4–10.8)
WBC # BLD AUTO: 12.97 10*3/MM3 (ref 3.4–10.8)
WBC # BLD AUTO: 13.29 10*3/MM3 (ref 3.4–10.8)
WBC # BLD AUTO: 23.15 10*3/MM3 (ref 3.4–10.8)
WBC # BLD AUTO: 25.86 10*3/MM3 (ref 3.4–10.8)
WBC # BLD AUTO: 28.32 10*3/MM3 (ref 3.4–10.8)
WBC # BLD AUTO: 9.13 10*3/MM3 (ref 3.4–10.8)
WBC MORPH BLD: NORMAL
WBC UR QL AUTO: ABNORMAL /HPF
WHOLE BLOOD HOLD SPECIMEN: NORMAL

## 2021-01-01 PROCEDURE — 80076 HEPATIC FUNCTION PANEL: CPT | Performed by: INTERNAL MEDICINE

## 2021-01-01 PROCEDURE — 94660 CPAP INITIATION&MGMT: CPT

## 2021-01-01 PROCEDURE — 82805 BLOOD GASES W/O2 SATURATION: CPT

## 2021-01-01 PROCEDURE — 86900 BLOOD TYPING SEROLOGIC ABO: CPT | Performed by: INTERNAL MEDICINE

## 2021-01-01 PROCEDURE — 84145 PROCALCITONIN (PCT): CPT | Performed by: INTERNAL MEDICINE

## 2021-01-01 PROCEDURE — 83605 ASSAY OF LACTIC ACID: CPT | Performed by: INTERNAL MEDICINE

## 2021-01-01 PROCEDURE — 82140 ASSAY OF AMMONIA: CPT | Performed by: INTERNAL MEDICINE

## 2021-01-01 PROCEDURE — 82962 GLUCOSE BLOOD TEST: CPT

## 2021-01-01 PROCEDURE — P9016 RBC LEUKOCYTES REDUCED: HCPCS

## 2021-01-01 PROCEDURE — 25010000002 HYDROMORPHONE PER 4 MG: Performed by: INTERNAL MEDICINE

## 2021-01-01 PROCEDURE — 83735 ASSAY OF MAGNESIUM: CPT | Performed by: INTERNAL MEDICINE

## 2021-01-01 PROCEDURE — 25010000002 HYDROMORPHONE PER 4 MG: Performed by: NURSE PRACTITIONER

## 2021-01-01 PROCEDURE — 25010000002 LORAZEPAM PER 2 MG: Performed by: INTERNAL MEDICINE

## 2021-01-01 PROCEDURE — 25010000002 THIAMINE PER 100 MG: Performed by: INTERNAL MEDICINE

## 2021-01-01 PROCEDURE — 80053 COMPREHEN METABOLIC PANEL: CPT | Performed by: INTERNAL MEDICINE

## 2021-01-01 PROCEDURE — 99291 CRITICAL CARE FIRST HOUR: CPT | Performed by: INTERNAL MEDICINE

## 2021-01-01 PROCEDURE — 71045 X-RAY EXAM CHEST 1 VIEW: CPT

## 2021-01-01 PROCEDURE — P9612 CATHETERIZE FOR URINE SPEC: HCPCS

## 2021-01-01 PROCEDURE — 63710000001 INSULIN REGULAR HUMAN PER 5 UNITS: Performed by: INTERNAL MEDICINE

## 2021-01-01 PROCEDURE — P9041 ALBUMIN (HUMAN),5%, 50ML: HCPCS | Performed by: INTERNAL MEDICINE

## 2021-01-01 PROCEDURE — 84295 ASSAY OF SERUM SODIUM: CPT | Performed by: NURSE PRACTITIONER

## 2021-01-01 PROCEDURE — 85007 BL SMEAR W/DIFF WBC COUNT: CPT | Performed by: INTERNAL MEDICINE

## 2021-01-01 PROCEDURE — 82150 ASSAY OF AMYLASE: CPT | Performed by: INTERNAL MEDICINE

## 2021-01-01 PROCEDURE — 94799 UNLISTED PULMONARY SVC/PX: CPT

## 2021-01-01 PROCEDURE — 86901 BLOOD TYPING SEROLOGIC RH(D): CPT | Performed by: INTERNAL MEDICINE

## 2021-01-01 PROCEDURE — 85025 COMPLETE CBC W/AUTO DIFF WBC: CPT | Performed by: INTERNAL MEDICINE

## 2021-01-01 PROCEDURE — 80069 RENAL FUNCTION PANEL: CPT | Performed by: INTERNAL MEDICINE

## 2021-01-01 PROCEDURE — 93005 ELECTROCARDIOGRAM TRACING: CPT | Performed by: EMERGENCY MEDICINE

## 2021-01-01 PROCEDURE — 25010000002 PIPERACILLIN SOD-TAZOBACTAM PER 1 G

## 2021-01-01 PROCEDURE — 25010000003 POTASSIUM CHLORIDE PER 2 MEQ: Performed by: NURSE PRACTITIONER

## 2021-01-01 PROCEDURE — 86923 COMPATIBILITY TEST ELECTRIC: CPT

## 2021-01-01 PROCEDURE — 80179 DRUG ASSAY SALICYLATE: CPT | Performed by: NURSE PRACTITIONER

## 2021-01-01 PROCEDURE — 82330 ASSAY OF CALCIUM: CPT | Performed by: INTERNAL MEDICINE

## 2021-01-01 PROCEDURE — 87040 BLOOD CULTURE FOR BACTERIA: CPT | Performed by: INTERNAL MEDICINE

## 2021-01-01 PROCEDURE — 82465 ASSAY BLD/SERUM CHOLESTEROL: CPT | Performed by: INTERNAL MEDICINE

## 2021-01-01 PROCEDURE — 99238 HOSP IP/OBS DSCHRG MGMT 30/<: CPT | Performed by: INTERNAL MEDICINE

## 2021-01-01 PROCEDURE — 85652 RBC SED RATE AUTOMATED: CPT | Performed by: INTERNAL MEDICINE

## 2021-01-01 PROCEDURE — 84100 ASSAY OF PHOSPHORUS: CPT | Performed by: INTERNAL MEDICINE

## 2021-01-01 PROCEDURE — 86317 IMMUNOASSAY INFECTIOUS AGENT: CPT | Performed by: INTERNAL MEDICINE

## 2021-01-01 PROCEDURE — 25010000003 MAGNESIUM SULFATE 4 GM/100ML SOLUTION: Performed by: INTERNAL MEDICINE

## 2021-01-01 PROCEDURE — 87086 URINE CULTURE/COLONY COUNT: CPT | Performed by: NURSE PRACTITIONER

## 2021-01-01 PROCEDURE — 25010000002 LINEZOLID 600 MG/300ML SOLUTION: Performed by: INTERNAL MEDICINE

## 2021-01-01 PROCEDURE — 86140 C-REACTIVE PROTEIN: CPT | Performed by: INTERNAL MEDICINE

## 2021-01-01 PROCEDURE — 83036 HEMOGLOBIN GLYCOSYLATED A1C: CPT | Performed by: INTERNAL MEDICINE

## 2021-01-01 PROCEDURE — 99213 OFFICE O/P EST LOW 20 MIN: CPT | Performed by: ORTHOPAEDIC SURGERY

## 2021-01-01 PROCEDURE — 70450 CT HEAD/BRAIN W/O DYE: CPT

## 2021-01-01 PROCEDURE — 85730 THROMBOPLASTIN TIME PARTIAL: CPT | Performed by: INTERNAL MEDICINE

## 2021-01-01 PROCEDURE — 84478 ASSAY OF TRIGLYCERIDES: CPT | Performed by: INTERNAL MEDICINE

## 2021-01-01 PROCEDURE — 25010000002 ALBUMIN HUMAN 25% PER 50 ML: Performed by: NURSE PRACTITIONER

## 2021-01-01 PROCEDURE — 85018 HEMOGLOBIN: CPT | Performed by: INTERNAL MEDICINE

## 2021-01-01 PROCEDURE — 82550 ASSAY OF CK (CPK): CPT | Performed by: NURSE PRACTITIONER

## 2021-01-01 PROCEDURE — 85610 PROTHROMBIN TIME: CPT | Performed by: NURSE PRACTITIONER

## 2021-01-01 PROCEDURE — 86900 BLOOD TYPING SEROLOGIC ABO: CPT

## 2021-01-01 PROCEDURE — 87077 CULTURE AEROBIC IDENTIFY: CPT | Performed by: NURSE PRACTITIONER

## 2021-01-01 PROCEDURE — G0432 EIA HIV-1/HIV-2 SCREEN: HCPCS | Performed by: INTERNAL MEDICINE

## 2021-01-01 PROCEDURE — 99285 EMERGENCY DEPT VISIT HI MDM: CPT

## 2021-01-01 PROCEDURE — 82533 TOTAL CORTISOL: CPT | Performed by: INTERNAL MEDICINE

## 2021-01-01 PROCEDURE — 84443 ASSAY THYROID STIM HORMONE: CPT | Performed by: INTERNAL MEDICINE

## 2021-01-01 PROCEDURE — 84439 ASSAY OF FREE THYROXINE: CPT | Performed by: NURSE PRACTITIONER

## 2021-01-01 PROCEDURE — 83880 ASSAY OF NATRIURETIC PEPTIDE: CPT | Performed by: INTERNAL MEDICINE

## 2021-01-01 PROCEDURE — 97110 THERAPEUTIC EXERCISES: CPT

## 2021-01-01 PROCEDURE — 99232 SBSQ HOSP IP/OBS MODERATE 35: CPT | Performed by: NURSE PRACTITIONER

## 2021-01-01 PROCEDURE — 80048 BASIC METABOLIC PNL TOTAL CA: CPT | Performed by: INTERNAL MEDICINE

## 2021-01-01 PROCEDURE — 85610 PROTHROMBIN TIME: CPT | Performed by: INTERNAL MEDICINE

## 2021-01-01 PROCEDURE — 25010000002 PIPERACILLIN SOD-TAZOBACTAM PER 1 G: Performed by: NURSE PRACTITIONER

## 2021-01-01 PROCEDURE — 87493 C DIFF AMPLIFIED PROBE: CPT | Performed by: INTERNAL MEDICINE

## 2021-01-01 PROCEDURE — 25010000003 PHYTONADIONE 10 MG/ML SOLUTION 1 ML AMPULE: Performed by: INTERNAL MEDICINE

## 2021-01-01 PROCEDURE — 80053 COMPREHEN METABOLIC PANEL: CPT | Performed by: EMERGENCY MEDICINE

## 2021-01-01 PROCEDURE — 80143 DRUG ASSAY ACETAMINOPHEN: CPT | Performed by: NURSE PRACTITIONER

## 2021-01-01 PROCEDURE — 97166 OT EVAL MOD COMPLEX 45 MIN: CPT

## 2021-01-01 PROCEDURE — 74176 CT ABD & PELVIS W/O CONTRAST: CPT

## 2021-01-01 PROCEDURE — 83930 ASSAY OF BLOOD OSMOLALITY: CPT | Performed by: INTERNAL MEDICINE

## 2021-01-01 PROCEDURE — 82270 OCCULT BLOOD FECES: CPT | Performed by: NURSE PRACTITIONER

## 2021-01-01 PROCEDURE — 87185 SC STD ENZYME DETCJ PER NZM: CPT | Performed by: INTERNAL MEDICINE

## 2021-01-01 PROCEDURE — 84443 ASSAY THYROID STIM HORMONE: CPT | Performed by: NURSE PRACTITIONER

## 2021-01-01 PROCEDURE — 84134 ASSAY OF PREALBUMIN: CPT | Performed by: INTERNAL MEDICINE

## 2021-01-01 PROCEDURE — 82140 ASSAY OF AMMONIA: CPT | Performed by: NURSE PRACTITIONER

## 2021-01-01 PROCEDURE — 93005 ELECTROCARDIOGRAM TRACING: CPT

## 2021-01-01 PROCEDURE — 85014 HEMATOCRIT: CPT | Performed by: INTERNAL MEDICINE

## 2021-01-01 PROCEDURE — 82248 BILIRUBIN DIRECT: CPT | Performed by: INTERNAL MEDICINE

## 2021-01-01 PROCEDURE — 83050 HGB METHEMOGLOBIN QUAN: CPT

## 2021-01-01 PROCEDURE — 76705 ECHO EXAM OF ABDOMEN: CPT

## 2021-01-01 PROCEDURE — 82375 ASSAY CARBOXYHB QUANT: CPT

## 2021-01-01 PROCEDURE — 82077 ASSAY SPEC XCP UR&BREATH IA: CPT | Performed by: NURSE PRACTITIONER

## 2021-01-01 PROCEDURE — 84295 ASSAY OF SERUM SODIUM: CPT | Performed by: INTERNAL MEDICINE

## 2021-01-01 PROCEDURE — 80306 DRUG TEST PRSMV INSTRMNT: CPT | Performed by: NURSE PRACTITIONER

## 2021-01-01 PROCEDURE — 77080 DXA BONE DENSITY AXIAL: CPT

## 2021-01-01 PROCEDURE — 36556 INSERT NON-TUNNEL CV CATH: CPT | Performed by: NURSE PRACTITIONER

## 2021-01-01 PROCEDURE — 99232 SBSQ HOSP IP/OBS MODERATE 35: CPT | Performed by: INTERNAL MEDICINE

## 2021-01-01 PROCEDURE — P9041 ALBUMIN (HUMAN),5%, 50ML: HCPCS | Performed by: NURSE PRACTITIONER

## 2021-01-01 PROCEDURE — 84100 ASSAY OF PHOSPHORUS: CPT | Performed by: NURSE PRACTITIONER

## 2021-01-01 PROCEDURE — 93005 ELECTROCARDIOGRAM TRACING: CPT | Performed by: INTERNAL MEDICINE

## 2021-01-01 PROCEDURE — 25810000003 SODIUM CHLORIDE 0.9 % WITH KCL 20 MEQ 20-0.9 MEQ/L-% SOLUTION: Performed by: INTERNAL MEDICINE

## 2021-01-01 PROCEDURE — 80202 ASSAY OF VANCOMYCIN: CPT | Performed by: NURSE PRACTITIONER

## 2021-01-01 PROCEDURE — 76937 US GUIDE VASCULAR ACCESS: CPT | Performed by: NURSE PRACTITIONER

## 2021-01-01 PROCEDURE — 84300 ASSAY OF URINE SODIUM: CPT | Performed by: NURSE PRACTITIONER

## 2021-01-01 PROCEDURE — 86901 BLOOD TYPING SEROLOGIC RH(D): CPT

## 2021-01-01 PROCEDURE — 86850 RBC ANTIBODY SCREEN: CPT | Performed by: INTERNAL MEDICINE

## 2021-01-01 PROCEDURE — 73630 X-RAY EXAM OF FOOT: CPT

## 2021-01-01 PROCEDURE — 85025 COMPLETE CBC W/AUTO DIFF WBC: CPT | Performed by: EMERGENCY MEDICINE

## 2021-01-01 PROCEDURE — 25010000002 ALBUMIN HUMAN 5% PER 50 ML: Performed by: NURSE PRACTITIONER

## 2021-01-01 PROCEDURE — 25010000002 ALBUMIN HUMAN 5% PER 50 ML: Performed by: INTERNAL MEDICINE

## 2021-01-01 PROCEDURE — 02HV33Z INSERTION OF INFUSION DEVICE INTO SUPERIOR VENA CAVA, PERCUTANEOUS APPROACH: ICD-10-PCS | Performed by: NURSE PRACTITIONER

## 2021-01-01 PROCEDURE — 36600 WITHDRAWAL OF ARTERIAL BLOOD: CPT

## 2021-01-01 PROCEDURE — 87186 SC STD MICRODIL/AGAR DIL: CPT | Performed by: NURSE PRACTITIONER

## 2021-01-01 PROCEDURE — 99204 OFFICE O/P NEW MOD 45 MIN: CPT | Performed by: ORTHOPAEDIC SURGERY

## 2021-01-01 PROCEDURE — 83690 ASSAY OF LIPASE: CPT | Performed by: INTERNAL MEDICINE

## 2021-01-01 PROCEDURE — 25010000003 POTASSIUM CHLORIDE PER 2 MEQ: Performed by: INTERNAL MEDICINE

## 2021-01-01 PROCEDURE — 86708 HEPATITIS A ANTIBODY: CPT | Performed by: INTERNAL MEDICINE

## 2021-01-01 PROCEDURE — 80074 ACUTE HEPATITIS PANEL: CPT | Performed by: INTERNAL MEDICINE

## 2021-01-01 PROCEDURE — 80053 COMPREHEN METABOLIC PANEL: CPT | Performed by: NURSE PRACTITIONER

## 2021-01-01 PROCEDURE — 74018 RADEX ABDOMEN 1 VIEW: CPT

## 2021-01-01 PROCEDURE — 93306 TTE W/DOPPLER COMPLETE: CPT

## 2021-01-01 PROCEDURE — 25010000002 CEFTRIAXONE PER 250 MG: Performed by: NURSE PRACTITIONER

## 2021-01-01 PROCEDURE — 87076 CULTURE ANAEROBE IDENT EACH: CPT | Performed by: INTERNAL MEDICINE

## 2021-01-01 PROCEDURE — 83880 ASSAY OF NATRIURETIC PEPTIDE: CPT | Performed by: NURSE PRACTITIONER

## 2021-01-01 PROCEDURE — 83735 ASSAY OF MAGNESIUM: CPT | Performed by: EMERGENCY MEDICINE

## 2021-01-01 PROCEDURE — 81001 URINALYSIS AUTO W/SCOPE: CPT | Performed by: NURSE PRACTITIONER

## 2021-01-01 PROCEDURE — 97162 PT EVAL MOD COMPLEX 30 MIN: CPT

## 2021-01-01 PROCEDURE — 25010000002 HALOPERIDOL LACTATE PER 5 MG: Performed by: INTERNAL MEDICINE

## 2021-01-01 PROCEDURE — 85007 BL SMEAR W/DIFF WBC COUNT: CPT | Performed by: EMERGENCY MEDICINE

## 2021-01-01 PROCEDURE — 83615 LACTATE (LD) (LDH) ENZYME: CPT | Performed by: INTERNAL MEDICINE

## 2021-01-01 PROCEDURE — 84484 ASSAY OF TROPONIN QUANT: CPT | Performed by: EMERGENCY MEDICINE

## 2021-01-01 PROCEDURE — 82330 ASSAY OF CALCIUM: CPT | Performed by: NURSE PRACTITIONER

## 2021-01-01 PROCEDURE — P9047 ALBUMIN (HUMAN), 25%, 50ML: HCPCS | Performed by: NURSE PRACTITIONER

## 2021-01-01 PROCEDURE — 83935 ASSAY OF URINE OSMOLALITY: CPT | Performed by: INTERNAL MEDICINE

## 2021-01-01 PROCEDURE — 36430 TRANSFUSION BLD/BLD COMPNT: CPT

## 2021-01-01 PROCEDURE — 99254 IP/OBS CNSLTJ NEW/EST MOD 60: CPT | Performed by: INTERNAL MEDICINE

## 2021-01-01 PROCEDURE — 87641 MR-STAPH DNA AMP PROBE: CPT

## 2021-01-01 PROCEDURE — 93306 TTE W/DOPPLER COMPLETE: CPT | Performed by: INTERNAL MEDICINE

## 2021-01-01 PROCEDURE — 87635 SARS-COV-2 COVID-19 AMP PRB: CPT | Performed by: NURSE PRACTITIONER

## 2021-01-01 PROCEDURE — 93010 ELECTROCARDIOGRAM REPORT: CPT | Performed by: INTERNAL MEDICINE

## 2021-01-01 RX ORDER — SODIUM CHLORIDE 0.9 % (FLUSH) 0.9 %
10 SYRINGE (ML) INJECTION EVERY 12 HOURS SCHEDULED
Status: CANCELLED | OUTPATIENT
Start: 2021-01-01

## 2021-01-01 RX ORDER — POTASSIUM CHLORIDE 29.8 MG/ML
20 INJECTION INTRAVENOUS
Status: DISCONTINUED | OUTPATIENT
Start: 2021-01-01 | End: 2021-01-01

## 2021-01-01 RX ORDER — SCOLOPAMINE TRANSDERMAL SYSTEM 1 MG/1
1 PATCH, EXTENDED RELEASE TRANSDERMAL
Status: DISCONTINUED | OUTPATIENT
Start: 2021-01-01 | End: 2021-01-01 | Stop reason: HOSPADM

## 2021-01-01 RX ORDER — LACTULOSE 10 G/15ML
20 SOLUTION ORAL 3 TIMES DAILY
Status: DISCONTINUED | OUTPATIENT
Start: 2021-01-01 | End: 2021-01-01

## 2021-01-01 RX ORDER — POTASSIUM CHLORIDE 750 MG/1
20 CAPSULE, EXTENDED RELEASE ORAL ONCE
Status: COMPLETED | OUTPATIENT
Start: 2021-01-01 | End: 2021-01-01

## 2021-01-01 RX ORDER — FUROSEMIDE 10 MG/ML
60 INJECTION INTRAMUSCULAR; INTRAVENOUS EVERY 12 HOURS PRN
Status: DISCONTINUED | OUTPATIENT
Start: 2021-01-01 | End: 2021-01-01 | Stop reason: HOSPADM

## 2021-01-01 RX ORDER — HYDROMORPHONE HYDROCHLORIDE 1 MG/ML
0.5 INJECTION, SOLUTION INTRAMUSCULAR; INTRAVENOUS; SUBCUTANEOUS
Status: DISCONTINUED | OUTPATIENT
Start: 2021-01-01 | End: 2021-01-01

## 2021-01-01 RX ORDER — HALOPERIDOL 5 MG/ML
1 INJECTION INTRAMUSCULAR EVERY 4 HOURS PRN
Status: DISCONTINUED | OUTPATIENT
Start: 2021-01-01 | End: 2021-01-01 | Stop reason: HOSPADM

## 2021-01-01 RX ORDER — POLYVINYL ALCOHOL 14 MG/ML
2 SOLUTION/ DROPS OPHTHALMIC
Status: DISCONTINUED | OUTPATIENT
Start: 2021-01-01 | End: 2021-01-01 | Stop reason: HOSPADM

## 2021-01-01 RX ORDER — MULTIPLE VITAMINS W/ MINERALS TAB 9MG-400MCG
1 TAB ORAL DAILY
Status: DISCONTINUED | OUTPATIENT
Start: 2021-01-01 | End: 2021-01-01

## 2021-01-01 RX ORDER — MAGNESIUM SULFATE HEPTAHYDRATE 40 MG/ML
4 INJECTION, SOLUTION INTRAVENOUS ONCE
Status: COMPLETED | OUTPATIENT
Start: 2021-01-01 | End: 2021-01-01

## 2021-01-01 RX ORDER — LACTULOSE 10 G/15ML
10 SOLUTION ORAL 3 TIMES DAILY
Status: DISCONTINUED | OUTPATIENT
Start: 2021-01-01 | End: 2021-01-01

## 2021-01-01 RX ORDER — SODIUM CHLORIDE 0.9 % (FLUSH) 0.9 %
10 SYRINGE (ML) INJECTION EVERY 12 HOURS SCHEDULED
Status: DISCONTINUED | OUTPATIENT
Start: 2021-01-01 | End: 2021-01-01 | Stop reason: HOSPADM

## 2021-01-01 RX ORDER — ALBUMIN (HUMAN) 12.5 G/50ML
50 SOLUTION INTRAVENOUS ONCE
Status: COMPLETED | OUTPATIENT
Start: 2021-01-01 | End: 2021-01-01

## 2021-01-01 RX ORDER — POTASSIUM CHLORIDE 750 MG/1
40 CAPSULE, EXTENDED RELEASE ORAL ONCE
Status: COMPLETED | OUTPATIENT
Start: 2021-01-01 | End: 2021-01-01

## 2021-01-01 RX ORDER — MULTIVIT AND MINERALS-FERROUS GLUCONATE 9 MG IRON/15 ML ORAL LIQUID 9 MG/15 ML
15 LIQUID (ML) ORAL DAILY
Status: DISCONTINUED | OUTPATIENT
Start: 2021-01-01 | End: 2021-01-01

## 2021-01-01 RX ORDER — HYDROMORPHONE HYDROCHLORIDE 1 MG/ML
0.5 INJECTION, SOLUTION INTRAMUSCULAR; INTRAVENOUS; SUBCUTANEOUS
Status: CANCELLED | OUTPATIENT
Start: 2021-01-01

## 2021-01-01 RX ORDER — NOREPINEPHRINE BIT/0.9 % NACL 8 MG/250ML
.02-.3 INFUSION BOTTLE (ML) INTRAVENOUS
Status: CANCELLED | OUTPATIENT
Start: 2021-01-01

## 2021-01-01 RX ORDER — LORAZEPAM 2 MG/ML
0.5 INJECTION INTRAMUSCULAR EVERY 4 HOURS PRN
Status: DISCONTINUED | OUTPATIENT
Start: 2021-01-01 | End: 2021-01-01 | Stop reason: HOSPADM

## 2021-01-01 RX ORDER — DEXTROSE, SODIUM CHLORIDE, SODIUM LACTATE, POTASSIUM CHLORIDE, AND CALCIUM CHLORIDE 5; .6; .31; .03; .02 G/100ML; G/100ML; G/100ML; G/100ML; G/100ML
75 INJECTION, SOLUTION INTRAVENOUS CONTINUOUS
Status: DISCONTINUED | OUTPATIENT
Start: 2021-01-01 | End: 2021-01-01

## 2021-01-01 RX ORDER — DEXTROSE MONOHYDRATE 25 G/50ML
25 INJECTION, SOLUTION INTRAVENOUS
Status: DISCONTINUED | OUTPATIENT
Start: 2021-01-01 | End: 2021-01-01 | Stop reason: HOSPADM

## 2021-01-01 RX ORDER — SODIUM CHLORIDE 0.9 % (FLUSH) 0.9 %
10 SYRINGE (ML) INJECTION AS NEEDED
Status: DISCONTINUED | OUTPATIENT
Start: 2021-01-01 | End: 2021-01-01 | Stop reason: HOSPADM

## 2021-01-01 RX ORDER — KETOROLAC TROMETHAMINE 15 MG/ML
15 INJECTION, SOLUTION INTRAMUSCULAR; INTRAVENOUS EVERY 12 HOURS PRN
Status: DISCONTINUED | OUTPATIENT
Start: 2021-01-01 | End: 2021-01-01 | Stop reason: HOSPADM

## 2021-01-01 RX ORDER — SODIUM CHLORIDE 0.9 % (FLUSH) 0.9 %
10 SYRINGE (ML) INJECTION AS NEEDED
Status: CANCELLED | OUTPATIENT
Start: 2021-01-01

## 2021-01-01 RX ORDER — SODIUM CHLORIDE 0.9 % (FLUSH) 0.9 %
10 SYRINGE (ML) INJECTION AS NEEDED
Status: DISCONTINUED | OUTPATIENT
Start: 2021-01-01 | End: 2021-01-01

## 2021-01-01 RX ORDER — FAMOTIDINE 10 MG/ML
20 INJECTION, SOLUTION INTRAVENOUS DAILY
Status: DISCONTINUED | OUTPATIENT
Start: 2021-01-01 | End: 2021-01-01

## 2021-01-01 RX ORDER — SODIUM CHLORIDE 9 MG/ML
75 INJECTION, SOLUTION INTRAVENOUS CONTINUOUS
Status: DISCONTINUED | OUTPATIENT
Start: 2021-01-01 | End: 2021-01-01

## 2021-01-01 RX ORDER — LORAZEPAM 2 MG/ML
0.5 INJECTION INTRAMUSCULAR EVERY 4 HOURS PRN
Status: CANCELLED | OUTPATIENT
Start: 2021-01-01 | End: 2021-06-18

## 2021-01-01 RX ORDER — SACCHAROMYCES BOULARDII 250 MG
500 CAPSULE ORAL 2 TIMES DAILY
Status: DISCONTINUED | OUTPATIENT
Start: 2021-01-01 | End: 2021-01-01

## 2021-01-01 RX ORDER — ALBUMIN, HUMAN INJ 5% 5 %
500 SOLUTION INTRAVENOUS ONCE
Status: COMPLETED | OUTPATIENT
Start: 2021-01-01 | End: 2021-01-01

## 2021-01-01 RX ORDER — GLYCOPYRROLATE 0.2 MG/ML
0.4 INJECTION INTRAMUSCULAR; INTRAVENOUS EVERY 4 HOURS PRN
Status: DISCONTINUED | OUTPATIENT
Start: 2021-01-01 | End: 2021-01-01 | Stop reason: HOSPADM

## 2021-01-01 RX ORDER — ACETAMINOPHEN 650 MG/1
650 SUPPOSITORY RECTAL EVERY 4 HOURS PRN
Status: DISCONTINUED | OUTPATIENT
Start: 2021-01-01 | End: 2021-01-01 | Stop reason: HOSPADM

## 2021-01-01 RX ORDER — LOSARTAN POTASSIUM 50 MG/1
50 TABLET ORAL DAILY
Qty: 30 TABLET | Refills: 0 | Status: SHIPPED | OUTPATIENT
Start: 2021-01-01

## 2021-01-01 RX ORDER — ALBUMIN (HUMAN) 12.5 G/50ML
12.5 SOLUTION INTRAVENOUS AS NEEDED
Status: CANCELLED | OUTPATIENT
Start: 2021-01-01 | End: 2021-01-01

## 2021-01-01 RX ORDER — HEPARIN SODIUM 1000 [USP'U]/ML
INJECTION, SOLUTION INTRAVENOUS; SUBCUTANEOUS
Status: DISPENSED
Start: 2021-01-01 | End: 2021-01-01

## 2021-01-01 RX ORDER — PANTOPRAZOLE SODIUM 40 MG/10ML
40 INJECTION, POWDER, LYOPHILIZED, FOR SOLUTION INTRAVENOUS
Status: DISCONTINUED | OUTPATIENT
Start: 2021-01-01 | End: 2021-01-01 | Stop reason: HOSPADM

## 2021-01-01 RX ORDER — FAMOTIDINE 20 MG/1
20 TABLET, FILM COATED ORAL DAILY
Status: DISCONTINUED | OUTPATIENT
Start: 2021-01-01 | End: 2021-01-01

## 2021-01-01 RX ORDER — HYDROMORPHONE HYDROCHLORIDE 1 MG/ML
0.5 INJECTION, SOLUTION INTRAMUSCULAR; INTRAVENOUS; SUBCUTANEOUS EVERY 6 HOURS
Status: DISCONTINUED | OUTPATIENT
Start: 2021-01-01 | End: 2021-01-01 | Stop reason: HOSPADM

## 2021-01-01 RX ORDER — PANTOPRAZOLE SODIUM 40 MG/10ML
40 INJECTION, POWDER, LYOPHILIZED, FOR SOLUTION INTRAVENOUS EVERY 12 HOURS SCHEDULED
Status: DISCONTINUED | OUTPATIENT
Start: 2021-01-01 | End: 2021-01-01 | Stop reason: HOSPADM

## 2021-01-01 RX ORDER — NICOTINE POLACRILEX 4 MG
15 LOZENGE BUCCAL
Status: DISCONTINUED | OUTPATIENT
Start: 2021-01-01 | End: 2021-01-01

## 2021-01-01 RX ORDER — FAMOTIDINE 20 MG/1
20 TABLET, FILM COATED ORAL 2 TIMES DAILY PRN
Status: DISCONTINUED | OUTPATIENT
Start: 2021-01-01 | End: 2021-01-01

## 2021-01-01 RX ORDER — 3% SODIUM CHLORIDE 3 G/100ML
30 INJECTION, SOLUTION INTRAVENOUS CONTINUOUS
Status: DISCONTINUED | OUTPATIENT
Start: 2021-01-01 | End: 2021-01-01

## 2021-01-01 RX ORDER — SODIUM CHLORIDE, SODIUM LACTATE, POTASSIUM CHLORIDE, CALCIUM CHLORIDE 600; 310; 30; 20 MG/100ML; MG/100ML; MG/100ML; MG/100ML
50 INJECTION, SOLUTION INTRAVENOUS CONTINUOUS
Status: DISCONTINUED | OUTPATIENT
Start: 2021-01-01 | End: 2021-01-01

## 2021-01-01 RX ORDER — PANTOPRAZOLE SODIUM 40 MG/10ML
40 INJECTION, POWDER, LYOPHILIZED, FOR SOLUTION INTRAVENOUS
Status: CANCELLED | OUTPATIENT
Start: 2021-01-01

## 2021-01-01 RX ORDER — POTASSIUM CHLORIDE 29.8 MG/ML
20 INJECTION INTRAVENOUS ONCE
Status: COMPLETED | OUTPATIENT
Start: 2021-01-01 | End: 2021-01-01

## 2021-01-01 RX ORDER — NOREPINEPHRINE BIT/0.9 % NACL 8 MG/250ML
.02-.3 INFUSION BOTTLE (ML) INTRAVENOUS
Status: DISCONTINUED | OUTPATIENT
Start: 2021-01-01 | End: 2021-01-01 | Stop reason: HOSPADM

## 2021-01-01 RX ORDER — LINEZOLID 2 MG/ML
600 INJECTION, SOLUTION INTRAVENOUS EVERY 12 HOURS SCHEDULED
Status: DISCONTINUED | OUTPATIENT
Start: 2021-01-01 | End: 2021-01-01

## 2021-01-01 RX ORDER — HYDROMORPHONE HYDROCHLORIDE 1 MG/ML
0.5 INJECTION, SOLUTION INTRAMUSCULAR; INTRAVENOUS; SUBCUTANEOUS ONCE
Status: DISCONTINUED | OUTPATIENT
Start: 2021-01-01 | End: 2021-01-01

## 2021-01-01 RX ORDER — SODIUM CHLORIDE AND POTASSIUM CHLORIDE 150; 900 MG/100ML; MG/100ML
50 INJECTION, SOLUTION INTRAVENOUS CONTINUOUS
Status: DISCONTINUED | OUTPATIENT
Start: 2021-01-01 | End: 2021-01-01

## 2021-01-01 RX ORDER — HYDROMORPHONE HYDROCHLORIDE 1 MG/ML
0.5 INJECTION, SOLUTION INTRAMUSCULAR; INTRAVENOUS; SUBCUTANEOUS
Status: DISCONTINUED | OUTPATIENT
Start: 2021-01-01 | End: 2021-01-01 | Stop reason: HOSPADM

## 2021-01-01 RX ORDER — ALBUMIN (HUMAN) 12.5 G/50ML
12.5 SOLUTION INTRAVENOUS AS NEEDED
Status: DISCONTINUED | OUTPATIENT
Start: 2021-01-01 | End: 2021-01-01

## 2021-01-01 RX ORDER — MIDODRINE HYDROCHLORIDE 5 MG/1
5 TABLET ORAL
Status: DISCONTINUED | OUTPATIENT
Start: 2021-01-01 | End: 2021-01-01

## 2021-01-01 RX ORDER — PANTOPRAZOLE SODIUM 40 MG/10ML
40 INJECTION, POWDER, LYOPHILIZED, FOR SOLUTION INTRAVENOUS EVERY 12 HOURS SCHEDULED
Status: DISCONTINUED | OUTPATIENT
Start: 2021-01-01 | End: 2021-01-01 | Stop reason: CLARIF

## 2021-01-01 RX ORDER — DEXTROSE MONOHYDRATE 25 G/50ML
25 INJECTION, SOLUTION INTRAVENOUS
Status: CANCELLED | OUTPATIENT
Start: 2021-01-01

## 2021-01-01 RX ADMIN — Medication 15 ML: at 16:47

## 2021-01-01 RX ADMIN — THIAMINE HYDROCHLORIDE 500 MG: 100 INJECTION, SOLUTION INTRAMUSCULAR; INTRAVENOUS at 05:29

## 2021-01-01 RX ADMIN — FOLIC ACID 1 MG: 5 INJECTION, SOLUTION INTRAMUSCULAR; INTRAVENOUS; SUBCUTANEOUS at 09:21

## 2021-01-01 RX ADMIN — ALBUMIN HUMAN 500 ML: 0.05 INJECTION, SOLUTION INTRAVENOUS at 01:31

## 2021-01-01 RX ADMIN — Medication 0.04 MCG/KG/MIN: at 21:55

## 2021-01-01 RX ADMIN — SODIUM CHLORIDE, SODIUM LACTATE, POTASSIUM CHLORIDE, CALCIUM CHLORIDE AND DEXTROSE MONOHYDRATE 100 ML/HR: 5; 600; 310; 30; 20 INJECTION, SOLUTION INTRAVENOUS at 23:49

## 2021-01-01 RX ADMIN — MIDODRINE HYDROCHLORIDE 5 MG: 5 TABLET ORAL at 06:30

## 2021-01-01 RX ADMIN — PIPERACILLIN AND TAZOBACTAM 2.25 G: 2; .25 INJECTION, POWDER, LYOPHILIZED, FOR SOLUTION INTRAVENOUS at 05:36

## 2021-01-01 RX ADMIN — LACTULOSE 10 G: 20 SOLUTION ORAL at 08:06

## 2021-01-01 RX ADMIN — LINEZOLID 600 MG: 600 INJECTION, SOLUTION INTRAVENOUS at 21:48

## 2021-01-01 RX ADMIN — Medication 15 ML: at 09:05

## 2021-01-01 RX ADMIN — LACTULOSE 10 G: 20 SOLUTION ORAL at 15:03

## 2021-01-01 RX ADMIN — Medication 2 PACKET: at 09:45

## 2021-01-01 RX ADMIN — LINEZOLID 600 MG: 600 INJECTION, SOLUTION INTRAVENOUS at 09:45

## 2021-01-01 RX ADMIN — PANTOPRAZOLE SODIUM 40 MG: 40 INJECTION, POWDER, FOR SOLUTION INTRAVENOUS at 21:19

## 2021-01-01 RX ADMIN — DEXTROSE MONOHYDRATE 25 G: 500 INJECTION PARENTERAL at 23:55

## 2021-01-01 RX ADMIN — LACTULOSE 20 G: 20 SOLUTION ORAL at 21:19

## 2021-01-01 RX ADMIN — PANTOPRAZOLE SODIUM 40 MG: 40 INJECTION, POWDER, FOR SOLUTION INTRAVENOUS at 21:08

## 2021-01-01 RX ADMIN — THIAMINE HYDROCHLORIDE 200 MG: 100 INJECTION, SOLUTION INTRAMUSCULAR; INTRAVENOUS at 13:10

## 2021-01-01 RX ADMIN — PANTOPRAZOLE SODIUM 40 MG: 40 INJECTION, POWDER, FOR SOLUTION INTRAVENOUS at 20:18

## 2021-01-01 RX ADMIN — POTASSIUM CHLORIDE AND SODIUM CHLORIDE 75 ML/HR: 900; 150 INJECTION, SOLUTION INTRAVENOUS at 12:01

## 2021-01-01 RX ADMIN — SODIUM CHLORIDE, PRESERVATIVE FREE 10 ML: 5 INJECTION INTRAVENOUS at 22:31

## 2021-01-01 RX ADMIN — FAMOTIDINE 20 MG: 10 INJECTION, SOLUTION INTRAVENOUS at 09:05

## 2021-01-01 RX ADMIN — LORAZEPAM 0.5 MG: 2 INJECTION INTRAMUSCULAR; INTRAVENOUS at 21:25

## 2021-01-01 RX ADMIN — LINEZOLID 600 MG: 600 INJECTION, SOLUTION INTRAVENOUS at 22:29

## 2021-01-01 RX ADMIN — PANTOPRAZOLE SODIUM 40 MG: 40 INJECTION, POWDER, FOR SOLUTION INTRAVENOUS at 08:08

## 2021-01-01 RX ADMIN — THIAMINE HYDROCHLORIDE 500 MG: 100 INJECTION, SOLUTION INTRAMUSCULAR; INTRAVENOUS at 21:19

## 2021-01-01 RX ADMIN — MINERAL OIL: 1000 LIQUID ORAL at 16:00

## 2021-01-01 RX ADMIN — SODIUM PHOSPHATE, MONOBASIC, MONOHYDRATE AND SODIUM PHOSPHATE, DIBASIC, ANHYDROUS 21 MMOL: 276; 142 INJECTION, SOLUTION INTRAVENOUS at 10:50

## 2021-01-01 RX ADMIN — PANTOPRAZOLE SODIUM 40 MG: 40 INJECTION, POWDER, FOR SOLUTION INTRAVENOUS at 22:57

## 2021-01-01 RX ADMIN — FOLIC ACID 1 MG: 5 INJECTION, SOLUTION INTRAMUSCULAR; INTRAVENOUS; SUBCUTANEOUS at 08:06

## 2021-01-01 RX ADMIN — HYDROMORPHONE HYDROCHLORIDE 0.5 MG: 1 INJECTION, SOLUTION INTRAMUSCULAR; INTRAVENOUS; SUBCUTANEOUS at 23:35

## 2021-01-01 RX ADMIN — Medication 0.06 MCG/KG/MIN: at 09:14

## 2021-01-01 RX ADMIN — LINEZOLID 600 MG: 600 INJECTION, SOLUTION INTRAVENOUS at 09:04

## 2021-01-01 RX ADMIN — Medication 0.12 MCG/KG/MIN: at 04:20

## 2021-01-01 RX ADMIN — INSULIN HUMAN 2 UNITS: 100 INJECTION, SOLUTION PARENTERAL at 06:05

## 2021-01-01 RX ADMIN — LORAZEPAM 0.5 MG: 2 INJECTION INTRAMUSCULAR; INTRAVENOUS at 02:10

## 2021-01-01 RX ADMIN — RIFAXIMIN 400 MG: 200 TABLET ORAL at 13:36

## 2021-01-01 RX ADMIN — MINERAL OIL: 1000 LIQUID ORAL at 20:41

## 2021-01-01 RX ADMIN — INSULIN HUMAN 2 UNITS: 100 INJECTION, SOLUTION PARENTERAL at 13:34

## 2021-01-01 RX ADMIN — HYDROMORPHONE HYDROCHLORIDE 0.5 MG: 1 INJECTION, SOLUTION INTRAMUSCULAR; INTRAVENOUS; SUBCUTANEOUS at 23:47

## 2021-01-01 RX ADMIN — LINEZOLID 600 MG: 600 INJECTION, SOLUTION INTRAVENOUS at 08:06

## 2021-01-01 RX ADMIN — LACTULOSE 10 G: 20 SOLUTION ORAL at 17:18

## 2021-01-01 RX ADMIN — THIAMINE HYDROCHLORIDE 500 MG: 100 INJECTION, SOLUTION INTRAMUSCULAR; INTRAVENOUS at 05:33

## 2021-01-01 RX ADMIN — Medication 0.14 MCG/KG/MIN: at 16:47

## 2021-01-01 RX ADMIN — MINERAL OIL: 1000 LIQUID ORAL at 09:42

## 2021-01-01 RX ADMIN — THIAMINE HYDROCHLORIDE 500 MG: 100 INJECTION, SOLUTION INTRAMUSCULAR; INTRAVENOUS at 13:34

## 2021-01-01 RX ADMIN — HALOPERIDOL LACTATE 1 MG: 5 INJECTION, SOLUTION INTRAMUSCULAR at 04:54

## 2021-01-01 RX ADMIN — MINERAL OIL: 1000 LIQUID ORAL at 10:17

## 2021-01-01 RX ADMIN — FOLIC ACID 1 MG: 5 INJECTION, SOLUTION INTRAMUSCULAR; INTRAVENOUS; SUBCUTANEOUS at 09:06

## 2021-01-01 RX ADMIN — SODIUM BICARBONATE 50 MEQ: 84 INJECTION, SOLUTION INTRAVENOUS at 12:37

## 2021-01-01 RX ADMIN — MINERAL OIL: 1000 LIQUID ORAL at 00:10

## 2021-01-01 RX ADMIN — LACTULOSE 10 G: 20 SOLUTION ORAL at 17:15

## 2021-01-01 RX ADMIN — LACTULOSE 10 G: 20 SOLUTION ORAL at 21:09

## 2021-01-01 RX ADMIN — DEXTROSE MONOHYDRATE 25 G: 500 INJECTION PARENTERAL at 12:20

## 2021-01-01 RX ADMIN — HYDROMORPHONE HYDROCHLORIDE 0.5 MG: 1 INJECTION, SOLUTION INTRAMUSCULAR; INTRAVENOUS; SUBCUTANEOUS at 02:10

## 2021-01-01 RX ADMIN — SODIUM CHLORIDE, PRESERVATIVE FREE 10 ML: 5 INJECTION INTRAVENOUS at 21:57

## 2021-01-01 RX ADMIN — PANTOPRAZOLE SODIUM 40 MG: 40 INJECTION, POWDER, FOR SOLUTION INTRAVENOUS at 09:45

## 2021-01-01 RX ADMIN — MIDODRINE HYDROCHLORIDE 5 MG: 5 TABLET ORAL at 09:04

## 2021-01-01 RX ADMIN — RIFAXIMIN 400 MG: 200 TABLET ORAL at 21:48

## 2021-01-01 RX ADMIN — POTASSIUM CHLORIDE 20 MEQ: 750 CAPSULE, EXTENDED RELEASE ORAL at 22:57

## 2021-01-01 RX ADMIN — RIFAXIMIN 400 MG: 200 TABLET ORAL at 06:12

## 2021-01-01 RX ADMIN — SODIUM CHLORIDE, PRESERVATIVE FREE 10 ML: 5 INJECTION INTRAVENOUS at 21:25

## 2021-01-01 RX ADMIN — RIFAXIMIN 400 MG: 200 TABLET ORAL at 21:20

## 2021-01-01 RX ADMIN — LACTULOSE 10 G: 20 SOLUTION ORAL at 09:45

## 2021-01-01 RX ADMIN — Medication 0.02 MCG/KG/MIN: at 01:31

## 2021-01-01 RX ADMIN — Medication 500 MG: at 13:30

## 2021-01-01 RX ADMIN — MINERAL OIL: 1000 LIQUID ORAL at 20:54

## 2021-01-01 RX ADMIN — GLYCOPYRROLATE 0.4 MG: 0.2 INJECTION INTRAMUSCULAR; INTRAVENOUS at 16:03

## 2021-01-01 RX ADMIN — Medication 15 ML: at 09:07

## 2021-01-01 RX ADMIN — LACTULOSE 10 G: 20 SOLUTION ORAL at 21:48

## 2021-01-01 RX ADMIN — INSULIN HUMAN 2 UNITS: 100 INJECTION, SOLUTION PARENTERAL at 00:30

## 2021-01-01 RX ADMIN — HYDROMORPHONE HYDROCHLORIDE 0.5 MG: 1 INJECTION, SOLUTION INTRAMUSCULAR; INTRAVENOUS; SUBCUTANEOUS at 20:54

## 2021-01-01 RX ADMIN — MIDODRINE HYDROCHLORIDE 5 MG: 5 TABLET ORAL at 10:40

## 2021-01-01 RX ADMIN — INSULIN HUMAN 2 UNITS: 100 INJECTION, SOLUTION PARENTERAL at 00:21

## 2021-01-01 RX ADMIN — Medication 0.3 MCG/KG/MIN: at 13:55

## 2021-01-01 RX ADMIN — RIFAXIMIN 400 MG: 200 TABLET ORAL at 05:29

## 2021-01-01 RX ADMIN — TAZOBACTAM SODIUM AND PIPERACILLIN SODIUM 3.38 G: 375; 3 INJECTION, SOLUTION INTRAVENOUS at 05:47

## 2021-01-01 RX ADMIN — SODIUM CHLORIDE, PRESERVATIVE FREE 10 ML: 5 INJECTION INTRAVENOUS at 20:18

## 2021-01-01 RX ADMIN — SODIUM CHLORIDE, PRESERVATIVE FREE 10 ML: 5 INJECTION INTRAVENOUS at 08:38

## 2021-01-01 RX ADMIN — HYDROMORPHONE HYDROCHLORIDE 0.5 MG: 1 INJECTION, SOLUTION INTRAMUSCULAR; INTRAVENOUS; SUBCUTANEOUS at 04:53

## 2021-01-01 RX ADMIN — MAGNESIUM SULFATE HEPTAHYDRATE 4 G: 40 INJECTION, SOLUTION INTRAVENOUS at 23:49

## 2021-01-01 RX ADMIN — HYDROMORPHONE HYDROCHLORIDE 0.5 MG: 1 INJECTION, SOLUTION INTRAMUSCULAR; INTRAVENOUS; SUBCUTANEOUS at 12:13

## 2021-01-01 RX ADMIN — TAZOBACTAM SODIUM AND PIPERACILLIN SODIUM 3.38 G: 375; 3 INJECTION, SOLUTION INTRAVENOUS at 06:29

## 2021-01-01 RX ADMIN — TAZOBACTAM SODIUM AND PIPERACILLIN SODIUM 3.38 G: 375; 3 INJECTION, SOLUTION INTRAVENOUS at 18:08

## 2021-01-01 RX ADMIN — HYDROMORPHONE HYDROCHLORIDE 0.5 MG: 1 INJECTION, SOLUTION INTRAMUSCULAR; INTRAVENOUS; SUBCUTANEOUS at 11:45

## 2021-01-01 RX ADMIN — POTASSIUM CHLORIDE 40 MEQ: 750 CAPSULE, EXTENDED RELEASE ORAL at 01:31

## 2021-01-01 RX ADMIN — INSULIN HUMAN 2 UNITS: 100 INJECTION, SOLUTION PARENTERAL at 13:32

## 2021-01-01 RX ADMIN — Medication 500 MG: at 09:04

## 2021-01-01 RX ADMIN — SODIUM PHOSPHATE, MONOBASIC, MONOHYDRATE AND SODIUM PHOSPHATE, DIBASIC, ANHYDROUS 30 MMOL: 276; 142 INJECTION, SOLUTION INTRAVENOUS at 21:19

## 2021-01-01 RX ADMIN — Medication 1 TABLET: at 08:08

## 2021-01-01 RX ADMIN — TAZOBACTAM SODIUM AND PIPERACILLIN SODIUM 3.38 G: 375; 3 INJECTION, SOLUTION INTRAVENOUS at 17:48

## 2021-01-01 RX ADMIN — HYDROMORPHONE HYDROCHLORIDE 0.5 MG: 1 INJECTION, SOLUTION INTRAMUSCULAR; INTRAVENOUS; SUBCUTANEOUS at 02:23

## 2021-01-01 RX ADMIN — PANTOPRAZOLE SODIUM 40 MG: 40 INJECTION, POWDER, FOR SOLUTION INTRAVENOUS at 05:47

## 2021-01-01 RX ADMIN — TAZOBACTAM SODIUM AND PIPERACILLIN SODIUM 3.38 G: 375; 3 INJECTION, SOLUTION INTRAVENOUS at 17:04

## 2021-01-01 RX ADMIN — RIFAXIMIN 400 MG: 200 TABLET ORAL at 13:10

## 2021-01-01 RX ADMIN — ALBUMIN HUMAN 500 ML: 0.05 INJECTION, SOLUTION INTRAVENOUS at 00:23

## 2021-01-01 RX ADMIN — Medication 500 MG: at 09:45

## 2021-01-01 RX ADMIN — MINERAL OIL: 1000 LIQUID ORAL at 18:28

## 2021-01-01 RX ADMIN — Medication 2 PACKET: at 21:20

## 2021-01-01 RX ADMIN — POTASSIUM CHLORIDE 20 MEQ: 29.8 INJECTION, SOLUTION INTRAVENOUS at 11:56

## 2021-01-01 RX ADMIN — MIDODRINE HYDROCHLORIDE 5 MG: 5 TABLET ORAL at 17:53

## 2021-01-01 RX ADMIN — Medication 500 MG: at 21:57

## 2021-01-01 RX ADMIN — Medication 15 ML: at 09:45

## 2021-01-01 RX ADMIN — HYDROMORPHONE HYDROCHLORIDE 0.5 MG: 1 INJECTION, SOLUTION INTRAMUSCULAR; INTRAVENOUS; SUBCUTANEOUS at 00:10

## 2021-01-01 RX ADMIN — TAZOBACTAM SODIUM AND PIPERACILLIN SODIUM 3.38 G: 375; 3 INJECTION, SOLUTION INTRAVENOUS at 06:07

## 2021-01-01 RX ADMIN — HYDROMORPHONE HYDROCHLORIDE 0.5 MG: 1 INJECTION, SOLUTION INTRAMUSCULAR; INTRAVENOUS; SUBCUTANEOUS at 12:12

## 2021-01-01 RX ADMIN — Medication 0.24 MCG/KG/MIN: at 09:07

## 2021-01-01 RX ADMIN — Medication 500 MG: at 21:09

## 2021-01-01 RX ADMIN — THIAMINE HYDROCHLORIDE 500 MG: 100 INJECTION, SOLUTION INTRAMUSCULAR; INTRAVENOUS at 13:57

## 2021-01-01 RX ADMIN — HALOPERIDOL LACTATE 1 MG: 5 INJECTION, SOLUTION INTRAMUSCULAR at 01:02

## 2021-01-01 RX ADMIN — FAMOTIDINE 20 MG: 20 TABLET, FILM COATED ORAL at 04:15

## 2021-01-01 RX ADMIN — LINEZOLID 600 MG: 600 INJECTION, SOLUTION INTRAVENOUS at 21:09

## 2021-01-01 RX ADMIN — FOLIC ACID 1 MG: 5 INJECTION, SOLUTION INTRAMUSCULAR; INTRAVENOUS; SUBCUTANEOUS at 09:45

## 2021-01-01 RX ADMIN — POTASSIUM PHOSPHATE, MONOBASIC AND POTASSIUM PHOSPHATE, DIBASIC 21 MMOL: 224; 236 INJECTION, SOLUTION, CONCENTRATE INTRAVENOUS at 11:48

## 2021-01-01 RX ADMIN — FOLIC ACID 1 MG: 5 INJECTION, SOLUTION INTRAMUSCULAR; INTRAVENOUS; SUBCUTANEOUS at 09:04

## 2021-01-01 RX ADMIN — SODIUM CHLORIDE, PRESERVATIVE FREE 10 ML: 5 INJECTION INTRAVENOUS at 21:20

## 2021-01-01 RX ADMIN — POTASSIUM PHOSPHATE, MONOBASIC AND POTASSIUM PHOSPHATE, DIBASIC 21 MMOL: 224; 236 INJECTION, SOLUTION, CONCENTRATE INTRAVENOUS at 09:04

## 2021-01-01 RX ADMIN — HYDROMORPHONE HYDROCHLORIDE 0.5 MG: 1 INJECTION, SOLUTION INTRAMUSCULAR; INTRAVENOUS; SUBCUTANEOUS at 18:28

## 2021-01-01 RX ADMIN — POTASSIUM CHLORIDE 20 MEQ: 29.8 INJECTION, SOLUTION INTRAVENOUS at 01:36

## 2021-01-01 RX ADMIN — HYDROMORPHONE HYDROCHLORIDE 0.5 MG: 1 INJECTION, SOLUTION INTRAMUSCULAR; INTRAVENOUS; SUBCUTANEOUS at 01:02

## 2021-01-01 RX ADMIN — Medication 500 MG: at 08:06

## 2021-01-01 RX ADMIN — HALOPERIDOL LACTATE 1 MG: 5 INJECTION, SOLUTION INTRAMUSCULAR at 00:03

## 2021-01-01 RX ADMIN — Medication 0.12 MCG/KG/MIN: at 07:26

## 2021-01-01 RX ADMIN — Medication 15 ML: at 08:06

## 2021-01-01 RX ADMIN — TAZOBACTAM SODIUM AND PIPERACILLIN SODIUM 3.38 G: 375; 3 INJECTION, SOLUTION INTRAVENOUS at 16:48

## 2021-01-01 RX ADMIN — PHYTONADIONE 10 MG: 10 INJECTION, EMULSION INTRAMUSCULAR; INTRAVENOUS; SUBCUTANEOUS at 10:41

## 2021-01-01 RX ADMIN — GLYCOPYRROLATE 0.4 MG: 0.2 INJECTION INTRAMUSCULAR; INTRAVENOUS at 00:39

## 2021-01-01 RX ADMIN — RIFAXIMIN 400 MG: 200 TABLET ORAL at 06:07

## 2021-01-01 RX ADMIN — LACTULOSE 10 G: 20 SOLUTION ORAL at 21:20

## 2021-01-01 RX ADMIN — RIFAXIMIN 400 MG: 200 TABLET ORAL at 06:30

## 2021-01-01 RX ADMIN — ALBUMIN HUMAN 50 G: 0.25 SOLUTION INTRAVENOUS at 01:37

## 2021-01-01 RX ADMIN — RIFAXIMIN 400 MG: 200 TABLET ORAL at 13:34

## 2021-01-01 RX ADMIN — Medication 0.1 MCG/KG/MIN: at 19:23

## 2021-01-01 RX ADMIN — SODIUM CHLORIDE, SODIUM LACTATE, POTASSIUM CHLORIDE, CALCIUM CHLORIDE AND DEXTROSE MONOHYDRATE 75 ML/HR: 5; 600; 310; 30; 20 INJECTION, SOLUTION INTRAVENOUS at 18:43

## 2021-01-01 RX ADMIN — PANTOPRAZOLE SODIUM 40 MG: 40 INJECTION, POWDER, FOR SOLUTION INTRAVENOUS at 08:38

## 2021-01-01 RX ADMIN — LINEZOLID 600 MG: 600 INJECTION, SOLUTION INTRAVENOUS at 22:31

## 2021-01-01 RX ADMIN — TAZOBACTAM SODIUM AND PIPERACILLIN SODIUM 3.38 G: 375; 3 INJECTION, SOLUTION INTRAVENOUS at 17:18

## 2021-01-01 RX ADMIN — LACTULOSE 10 G: 20 SOLUTION ORAL at 09:05

## 2021-01-01 RX ADMIN — POTASSIUM CHLORIDE 20 MEQ: 29.8 INJECTION, SOLUTION INTRAVENOUS at 06:04

## 2021-01-01 RX ADMIN — Medication 500 MG: at 21:19

## 2021-01-01 RX ADMIN — TAZOBACTAM SODIUM AND PIPERACILLIN SODIUM 3.38 G: 375; 3 INJECTION, SOLUTION INTRAVENOUS at 06:12

## 2021-01-01 RX ADMIN — HYDROMORPHONE HYDROCHLORIDE 0.5 MG: 1 INJECTION, SOLUTION INTRAMUSCULAR; INTRAVENOUS; SUBCUTANEOUS at 14:20

## 2021-01-01 RX ADMIN — RIFAXIMIN 400 MG: 200 TABLET ORAL at 21:09

## 2021-01-01 RX ADMIN — Medication 0.08 MCG/KG/MIN: at 10:37

## 2021-01-01 RX ADMIN — LORAZEPAM 0.5 MG: 2 INJECTION INTRAMUSCULAR; INTRAVENOUS at 21:58

## 2021-01-01 RX ADMIN — LORAZEPAM 0.5 MG: 2 INJECTION INTRAMUSCULAR; INTRAVENOUS at 02:37

## 2021-01-01 RX ADMIN — THIAMINE HYDROCHLORIDE 500 MG: 100 INJECTION, SOLUTION INTRAMUSCULAR; INTRAVENOUS at 05:49

## 2021-01-01 RX ADMIN — SODIUM CHLORIDE 30 ML/HR: 3 INJECTION, SOLUTION INTRAVENOUS at 20:23

## 2021-01-01 RX ADMIN — PANTOPRAZOLE SODIUM 40 MG: 40 INJECTION, POWDER, FOR SOLUTION INTRAVENOUS at 21:38

## 2021-01-01 RX ADMIN — PIPERACILLIN AND TAZOBACTAM 2.25 G: 2; .25 INJECTION, POWDER, LYOPHILIZED, FOR SOLUTION INTRAVENOUS at 00:52

## 2021-01-01 RX ADMIN — SODIUM CHLORIDE 1 G: 900 INJECTION INTRAVENOUS at 22:58

## 2021-01-01 RX ADMIN — Medication 1 PACKET: at 10:50

## 2021-01-01 RX ADMIN — TAZOBACTAM SODIUM AND PIPERACILLIN SODIUM 3.38 G: 375; 3 INJECTION, SOLUTION INTRAVENOUS at 17:24

## 2021-01-01 RX ADMIN — HYDROMORPHONE HYDROCHLORIDE 0.5 MG: 1 INJECTION, SOLUTION INTRAMUSCULAR; INTRAVENOUS; SUBCUTANEOUS at 10:12

## 2021-01-01 RX ADMIN — FOLIC ACID 1 MG: 5 INJECTION, SOLUTION INTRAMUSCULAR; INTRAVENOUS; SUBCUTANEOUS at 08:08

## 2021-01-01 RX ADMIN — LORAZEPAM 0.5 MG: 2 INJECTION INTRAMUSCULAR; INTRAVENOUS at 02:27

## 2021-01-01 RX ADMIN — RIFAXIMIN 400 MG: 200 TABLET ORAL at 21:19

## 2021-01-01 RX ADMIN — RIFAXIMIN 400 MG: 200 TABLET ORAL at 13:33

## 2021-01-01 RX ADMIN — THIAMINE HYDROCHLORIDE 500 MG: 100 INJECTION, SOLUTION INTRAMUSCULAR; INTRAVENOUS at 14:45

## 2021-01-01 RX ADMIN — PANTOPRAZOLE SODIUM 40 MG: 40 INJECTION, POWDER, FOR SOLUTION INTRAVENOUS at 09:06

## 2021-01-01 RX ADMIN — TAZOBACTAM SODIUM AND PIPERACILLIN SODIUM 3.38 G: 375; 3 INJECTION, SOLUTION INTRAVENOUS at 05:29

## 2021-01-01 RX ADMIN — LACTULOSE 20 G: 20 SOLUTION ORAL at 09:07

## 2021-01-01 RX ADMIN — SODIUM CHLORIDE, SODIUM LACTATE, POTASSIUM CHLORIDE, CALCIUM CHLORIDE AND DEXTROSE MONOHYDRATE 75 ML/HR: 5; 600; 310; 30; 20 INJECTION, SOLUTION INTRAVENOUS at 09:04

## 2021-01-01 RX ADMIN — MIDODRINE HYDROCHLORIDE 5 MG: 5 TABLET ORAL at 17:18

## 2021-01-01 RX ADMIN — MIDODRINE HYDROCHLORIDE 5 MG: 5 TABLET ORAL at 12:00

## 2021-01-01 RX ADMIN — LACTULOSE 10 G: 20 SOLUTION ORAL at 16:47

## 2021-01-01 RX ADMIN — Medication 0.2 MCG/KG/MIN: at 23:48

## 2021-01-01 RX ADMIN — FAMOTIDINE 20 MG: 20 TABLET, FILM COATED ORAL at 08:06

## 2021-01-01 RX ADMIN — HYDROMORPHONE HYDROCHLORIDE 0.5 MG: 1 INJECTION, SOLUTION INTRAMUSCULAR; INTRAVENOUS; SUBCUTANEOUS at 21:55

## 2021-01-01 RX ADMIN — LINEZOLID 600 MG: 600 INJECTION, SOLUTION INTRAVENOUS at 10:51

## 2021-01-06 NOTE — TELEPHONE ENCOUNTER
From: Laura Jang  To: Renetta Santizo MD  Sent: 1/4/2021 7:40 PM EST  Subject: Non-Urgent Medical Question    I scheduled an appointment for 4:00 on Thursday. I rolled my foot and have had pain and minor swelling since - 3 to 4 weeks now. I'm guessing I may need an x-ray. Could I get that before my appointment?

## 2021-01-08 NOTE — PROGRESS NOTES
[facility] Note    Subjective     Chief Complaint   Patient presents with   • Left Foot - Pain     DOI: roughly 12/10/20        HPI  Laura Jang is a 55 y.o. female who presents with new problem of: left foot pain.  Onset: twisting injury. The issue has been ongoing for 4 week(s). Pain is a 2/10 on the pain scale. Pain is described as dull and aching. Associated symptoms include pain and swelling. The pain is worse with walking, standing and climbing stairs; resting improve the pain. Previous treatments have included: nothing.    I have reviewed the following portions of the patient's history:History of Present Illness and review of systems.    No previous history of fractures.  She works as a .      Past Medical History:   Diagnosis Date   • Alcohol abuse    • Anxiety    • Arthritis    • Blistering of skin 3/1/2020   • Degenerative disc disease, lumbar    • Depression    • Hypertension       Past Surgical History:   Procedure Laterality Date   • APPENDECTOMY     • BARIATRIC SURGERY      2005, gastric sleeve   • CARPAL TUNNEL RELEASE Bilateral     07/2020   • CHOLECYSTECTOMY     • HERNIA REPAIR      2014   • SPINE SURGERY      cyst removal      Family History   Problem Relation Age of Onset   • Arthritis Mother    • Cancer Mother    • Obesity Mother    • Mental illness Mother    • Hypertension Mother    • Arthritis Sister    • Obesity Sister    • Mental illness Sister    • Hypertension Sister    • Obesity Brother    • Mental illness Brother    • Hypertension Brother    • Cancer Maternal Grandmother    • Breast cancer Neg Hx    • Ovarian cancer Neg Hx      Social History     Socioeconomic History   • Marital status:      Spouse name: Not on file   • Number of children: Not on file   • Years of education: Not on file   • Highest education level: Not on file   Tobacco Use   • Smoking status: Never Smoker   • Smokeless tobacco: Never Used   Substance and Sexual Activity   •  Alcohol use: Not Currently     Comment: quit 02/18/2020, prior heavy alcohol use for 15y   • Drug use: Yes   • Sexual activity: Not Currently     Partners: Male     Birth control/protection: Post-menopausal      Current Outpatient Medications on File Prior to Visit   Medication Sig Dispense Refill   • acyclovir (ZOVIRAX) 200 MG capsule Take  by mouth Every 4 (Four) Hours While Awake. 400mg the first day and 200mg for 4 days.     • acyclovir (ZOVIRAX) 5 % cream Apply  topically to the appropriate area as directed Every 3 (Three) Hours.     • B Complex-Biotin-FA (B-100 B-COMPLEX PO) Take  by mouth Daily.     • BIOTIN 5000 PO Take 5,000 mcg by mouth Daily.     • Brexpiprazole (Rexulti) 0.5 MG tablet Take 0.5 mg by mouth Daily.     • busPIRone (BUSPAR) 10 MG tablet Take 10 mg by mouth 2 (two) times a day.     • cetirizine (zyrTEC) 10 MG tablet Take 10 mg by mouth Daily.     • Cholecalciferol (VITAMIN D3) 125 MCG (5000 UT) capsule capsule Take 5,000 Units by mouth Daily.     • clindamycin-benzoyl peroxide (BENZACLIN) 1-5 % gel Apply  topically to the appropriate area as directed Daily. 50 g 11   • cloNIDine (CATAPRES) 0.1 MG tablet Take 0.1 mg by mouth Daily.     • doxepin (SINEquan) 10 MG capsule Take 10 mg by mouth Every Night.     • FLUoxetine (PROzac) 20 MG capsule Take 20 mg by mouth Daily.     • folic acid (FOLVITE) 1 MG tablet Take 1 tablet by mouth Daily. 90 tablet 1   • losartan (COZAAR) 50 MG tablet Take 1 tablet by mouth Daily. 30 tablet 5   • magnesium, as, gluconate (MAGONATE) 500 (27 Mg) MG tablet Take 125 mg by mouth 2 (Two) Times a Day.     • Multiple Vitamins-Minerals (HAIR SKIN AND NAILS FORMULA PO) Take 2,500 mcg by mouth Daily.     • Multiple Vitamins-Minerals (MULTIVITAMIN GUMMIES WOMENS) chewable tablet Chew 50 mg Daily.     • naltrexone (DEPADE) 50 MG tablet      • vitamin B-12 (CYANOCOBALAMIN) 1000 MCG tablet Take 3,000 mcg by mouth Daily.       No current facility-administered medications on file  "prior to visit.       No Known Allergies     The following portions of the patient's history were reviewed and updated as appropriate: allergies, current medications, past family history, past medical history, past social history, past surgical history and problem list.    Review of Systems   Constitutional: Negative.    HENT: Negative.    Eyes: Negative.    Respiratory: Negative.    Cardiovascular: Negative.    Gastrointestinal: Negative.    Endocrine: Negative.    Genitourinary: Negative.    Musculoskeletal: Positive for arthralgias.   Skin: Negative.    Allergic/Immunologic: Negative.    Neurological: Negative.    Hematological: Negative.    Psychiatric/Behavioral: Negative.         Objective      Physical Exam  Pulse 117   Ht 167.6 cm (65.98\")   Wt 90.4 kg (199 lb 3.2 oz)   SpO2 100%   BMI 32.17 kg/m²     Body mass index is 32.17 kg/m².    GENERAL APPEARANCE: awake, alert & oriented x 3, in no acute distress and well developed, well nourished  PSYCH: normal mood and affect  LUNGS:  breathing nonlabored, no wheezing  EYES: sclera anicteric, pupils equal  CARDIOVASCULAR: palpable pulses. Capillary refill less than 2 seconds  INTEGUMENTARY: skin intact, no clubbing, cyanosis  NEUROLOGIC:  Normal Sensation and reflexes       Ortho Exam  Peripheral Vascular:  Lower Extremity:  Inspection:  Left--rapid capillary refill  Palpation:  Dorsalis pedis pulse:  Left--normal    Neurologic  Sensory:    Light Touch:     Left foot:  Dorsal intact and plantar intact    Overall Assessment of Muscle Strength and Tone:  Lower Extremities:       Left:  Tibialis anterior--5/5    Gastroc soleus--5/5    EHL--5/5    FHL--5/5    Musculoskeletal  Lower Extremity  Ankle/Foot:  Inspection and Palpation:        Left:  Tenderness: Fifth metatarsal with swelling    Effusion:  None    Crepitus:  None     ROM:     Left: Plantarflexion--50    Dorsiflexion--20    Inversion--10    Eversion--10    Instability:          Left: Anterior drawer " test--negative    Squeeze test--negative    Imaging/Studies  Imaging Results (Last 7 Days)     ** No results found for the last 168 hours. **      I viewed the x-rays of January 6 which show displaced diaphyseal fifth metatarsal fracture  Assessment/Plan        ICD-10-CM ICD-9-CM   1. Displaced fracture of fifth metatarsal bone, left foot, initial encounter for closed fracture  S92.352A 825.25     She will be placed in a boot today.  She may weight-bear as tolerated in the boot and work in the boot.  Follow-up in a month with x-rays.  Prognosis is excellent.  Medical Decision Making  Management Options : over-the-counter medicine and major surgery with risk factors  Data/Risk: radiology tests and independent visualization of imaging, lab tests, or EMG/NCV    Dario Whitman MD  01/08/21  11:27 EST         EMR Dragon/Transcription disclaimer:  Much of this encounter note is an electronic transcription of spoken language to printed text. Electronic transcription of spoken language may permit erroneous, or at times, nonsensical words or phrases to be inadvertently transcribed. Although I have reviewed the note for such errors, some may still exist.

## 2021-02-05 NOTE — PROGRESS NOTES
"      [facility] Note    Subjective     CC: Follow-up (4 weeks- Displaced fracture of fifth metatarsal bone, left foot)      IRASEMA Jang is a 55 y.o. female.  She is follow-up left foot fracture.  She is doing better.  She still wearing the boot.    Review of Systems   Constitutional: Negative.  Negative for chills, fatigue and fever.   HENT: Negative.  Negative for congestion and dental problem.    Eyes: Negative.  Negative for blurred vision.   Respiratory: Negative.  Negative for shortness of breath.    Cardiovascular: Negative.  Negative for leg swelling.   Gastrointestinal: Negative.  Negative for abdominal pain.   Endocrine: Negative.  Negative for polyuria.   Genitourinary: Negative.  Negative for difficulty urinating.   Musculoskeletal: Positive for arthralgias.   Skin: Negative.    Allergic/Immunologic: Negative.    Neurological: Negative.    Hematological: Negative.  Negative for adenopathy.   Psychiatric/Behavioral: Negative.  Negative for behavioral problems.       ROS:    Constiutional:Pt denies fever, chills, nausea, or vomiting.  MSK:as above      Objective      Past Medical History  Past Medical History:   Diagnosis Date   • Alcohol abuse    • Anxiety    • Arthritis    • Blistering of skin 3/1/2020   • Degenerative disc disease, lumbar    • Depression    • Hypertension          Physical Exam  Pulse 85   Ht 167.6 cm (65.98\")   Wt 90.4 kg (199 lb 4.7 oz)   SpO2 97%   BMI 32.18 kg/m²     Body mass index is 32.18 kg/m².    Patient is well nourished and well developed.        Ortho Exam  Minimal left foot swelling and tenderness.    Imaging/Labs/EMG Reviewed:  Imaging Results (Last 24 Hours)     Procedure Component Value Units Date/Time    XR Foot 3+ View Left [[id number]] Resulted: 02/05/21 0844     Updated: 02/05/21 0846    Narrative:      Left Foot X-Ray  Indication: Pain  AP, Lateral, and Oblique views    Findings:  Healing left foot fifth metatarsal fracture  No bony " lesion  Normal soft tissues  Normal joint spaces    prior studies were available for comparison.          Assessment:  1. Fracture follow-up    2. Displaced fracture of fifth metatarsal bone, left foot, initial encounter for closed fracture        Plan:  1. Recommend over the counter anti-inflammatories for pain and/or swelling  2. I have ordered a bone density test.  She does not remember having 1.  I will see her back in 1 month with x-rays left foot.  She may weight-bear as tolerated.  She may wean out of the boot as tolerated.    Follow Up:   Return in about 1 month (around 3/5/2021) for x-ray.      Medical Decision Making  Management Options : Moderate - 1 of 3 Categories = Category 1 - Tests/Documents/Review of History Category 2 - Independent Interpret Tests Category 3 - Discussion of Mgmt or test interpretation with external MD Dario Whitman M.D., FAAOS  Orthopedic Surgeon  Fellowship Trained Sports Medicine  [facility]  Orthopedics and Sports Medicine  [address] [place] [address], [address]  [place] [address]

## 2021-03-10 NOTE — PROGRESS NOTES
"      [facility] Note    Subjective     CC: Follow-up (1 month follow up; Displaced fracture of fifth metatarsal bone, left foot (DOI: approximately 12/10/20))      IRASEMA Jang is a 55 y.o. female.  She is 3 months out the left foot fracture.  Her foot is doing great.  She fell at work and injured her right elbow.  She is seeing the company doctor about that.  Worker[employer] was sending her somewhere else and did not want me to see her for that so far.    Review of Systems   Constitutional: Negative.    HENT: Negative.    Eyes: Negative.    Respiratory: Negative.    Cardiovascular: Negative.    Gastrointestinal: Negative.    Endocrine: Negative.    Genitourinary: Negative.    Musculoskeletal: Positive for arthralgias.   Skin: Negative.    Allergic/Immunologic: Negative.    Neurological: Negative.    Hematological: Negative.    Psychiatric/Behavioral: Negative.        ROS:    Constiutional:Pt denies fever, chills, nausea, or vomiting.  MSK:as above      Objective      Past Medical History  Past Medical History:   Diagnosis Date   • Alcohol abuse    • Anxiety    • Arthritis    • Blistering of skin 3/1/2020   • Degenerative disc disease, lumbar    • Depression    • Hypertension          Physical Exam  /94   Pulse 110   Ht 167.6 cm (65.98\")   Wt 92.8 kg (204 lb 9.6 oz)   BMI 33.04 kg/m²     Body mass index is 33.04 kg/m².    Patient is well nourished and well developed.        Ortho Exam  Left foot has no swelling no tenderness no pain    Imaging/Labs/EMG Reviewed:  Imaging Results (Last 24 Hours)     Procedure Component Value Units Date/Time    XR Foot 3+ View Left [[id number]] Resulted: 03/10/21 0849     Updated: 03/10/21 0850    Narrative:      Left Foot X-Ray  Indication: Pain  AP, Lateral, and Oblique views    Findings:  Healed left fifth metatarsal fracture  No bony lesion  Normal soft tissues  Normal joint spaces    prior studies were available for comparison.    "       Assessment:  1. Fracture follow-up    2. Closed displaced fracture of fifth metatarsal bone of left foot with routine healing, subsequent encounter    3. Hypertension, unspecified type        Plan:  1. Recommend over the counter anti-inflammatories for pain and/or swelling  2. Her foot fracture is healed.  She will follow-up with her primary care doctor about her hypertension.  She is seeing the work doctor regarding her right elbow injury.    Follow Up:   Return if symptoms worsen or fail to improve.      Medical Decision Making  Management Options : Low - OTC Drugs and 1 of 2 Categories = Category 1 - Review of Tests and Documents Category 2 - Assessment requiring an independent interpretation of tests         Dario Whitman M.D., FAAOS  Orthopedic Surgeon  Fellowship Trained Sports Medicine  [facility]  Orthopedics and Sports Medicine  [address] [place] [address], [address]  [place] [address]

## 2021-06-01 PROBLEM — D72.829 LEUKOCYTOSIS: Status: ACTIVE | Noted: 2021-01-01

## 2021-06-01 PROBLEM — N39.0 UTI (URINARY TRACT INFECTION): Status: ACTIVE | Noted: 2021-01-01

## 2021-06-01 PROBLEM — D64.9 ANEMIA: Status: ACTIVE | Noted: 2021-01-01

## 2021-06-01 PROBLEM — R74.8 ELEVATED LIVER ENZYMES: Status: ACTIVE | Noted: 2021-01-01

## 2021-06-01 PROBLEM — N17.9 ACUTE RENAL FAILURE (HCC): Status: ACTIVE | Noted: 2021-01-01

## 2021-06-01 PROBLEM — E87.8 HYPOCHLOREMIA: Status: ACTIVE | Noted: 2021-01-01

## 2021-06-01 PROBLEM — N17.9 AKI (ACUTE KIDNEY INJURY) (HCC): Status: ACTIVE | Noted: 2021-01-01

## 2021-06-01 PROBLEM — E77.8 HYPOPROTEINEMIA (HCC): Status: ACTIVE | Noted: 2021-01-01

## 2021-06-01 PROBLEM — E87.1 HYPONATREMIA: Status: ACTIVE | Noted: 2021-01-01

## 2021-06-01 PROBLEM — R79.89 ELEVATED BRAIN NATRIURETIC PEPTIDE (BNP) LEVEL: Status: ACTIVE | Noted: 2021-01-01

## 2021-06-01 PROBLEM — E87.6 HYPOKALEMIA: Status: ACTIVE | Noted: 2021-01-01

## 2021-06-01 PROBLEM — R17 ELEVATED BILIRUBIN: Status: ACTIVE | Noted: 2021-01-01

## 2021-06-02 PROBLEM — K72.00 SHOCK LIVER: Status: ACTIVE | Noted: 2021-01-01

## 2021-06-02 PROBLEM — A41.50 SEPSIS DUE TO GRAM NEGATIVE BACTERIA (HCC): Status: ACTIVE | Noted: 2021-01-01

## 2021-06-02 NOTE — PROGRESS NOTES
"Pharmacy Consult-Vancomycin Dosing  Laura Jang is a  55 y.o. female receiving vancomycin therapy.     Indication:sepsis  Consulting Provider:  ID Consult:     Goal AUC: 400 - 600 mg/L*hr    Current Antimicrobial Therapy  Anti-Infectives (From admission, onward)      Ordered     Dose/Rate Route Frequency Start Stop    06/01/21 2103  piperacillin-tazobactam (ZOSYN) 3.375 g in iso-osmotic dextrose 50 ml (premix)     Ordering Provider: Raquel Vega APRN    3.375 g  over 30 Minutes Intravenous Once 06/01/21 2105 06/01/21 2057  vancomycin 1750 mg/500 mL 0.9% NS IVPB ([facility])     Ordering Provider: Raquel Vega APRN    20 mg/kg × 93 kg  over 120 Minutes Intravenous Once 06/01/21 2059 06/01/21 2054  Pharmacy to dose vancomycin     Ordering Provider: Raquel Vega APRN     Does not apply Continuous PRN 06/01/21 2053 07/01/21 2052 06/01/21 2032  cefTRIAXone (ROCEPHIN) 1 g/100 mL 0.9% NS (MBP)     Ordering Provider: Yoli Barrera APRN    1 g  over 30 Minutes Intravenous Once 06/01/21 2034              Allergies  Allergies as of 06/01/2021    (No Known Allergies)       Labs    Results from last 7 days   Lab Units 06/01/21  1736   BUN mg/dL 18   CREATININE mg/dL 4.48*       Results from last 7 days   Lab Units 06/01/21  1736   WBC 10*3/mm3 12.97*       Evaluation of Dosing     Last Dose Received in the ED/Outside Facility:   Is Patient on Dialysis or Renal Replacement:     Ht - 165.1 cm (65\")  Wt - 93 kg (205 lb)    Estimated Creatinine Clearance: 15.8 mL/min (A) (by C-G formula based on SCr of 4.48 mg/dL (H)).    Intake & Output (last 3 days)         05/30 0701 - 05/31 0700 05/31 0701 - 06/01 0700 06/01 0701 - 06/02 0700    I.V. (mL/kg)   200 (2.2)    Total Intake(mL/kg)   200 (2.2)    Net   +200                   Microbiology and Radiology  Microbiology Results (last 10 days)       Procedure Component Value - Date/Time    COVID PRE-OP / PRE-PROCEDURE SCREENING ORDER (NO ISOLATION) - Swab, Nasopharynx " "[[id number]]  (Normal) Collected: 06/01/21 1942    Lab Status: Final result Specimen: Swab from Nasopharynx Updated: 06/01/21 2028    Narrative:      The following orders were created for panel order COVID PRE-OP / PRE-PROCEDURE SCREENING ORDER (NO ISOLATION) - Swab, Nasopharynx.  Procedure                               Abnormality         Status                     ---------                               -----------         ------                     COVID-19, ABBOTT IN-HOUS...[[id number]]  Normal              Final result                 Please view results for these tests on the individual orders.    COVID-19, ABBOTT IN-HOUSE,NASAL Swab (NO TRANSPORT MEDIA) 2 HR TAT - Swab, Nasopharynx [[id number]]  (Normal) Collected: 06/01/21 1942    Lab Status: Final result Specimen: Swab from Nasopharynx Updated: 06/01/21 2028     COVID19 Presumptive Negative    Narrative:      Fact sheet for providers: https://www.fda.gov/media/899832/download     Fact sheet for patients: https://www.fda.gov/media/468554/download    Test performed by PCR.  Fact sheet for providers: https://www.fda.gov/media/993670/download     Fact sheet for patients: https://www.fda.gov/media/224433/download    Test performed by PCR.  If inconsistent with clinical signs and symptoms patient should be tested with different authorized molecular test.            Reported Vancomycin Levels                         Insight[employer] AUC Calculation:    Current AUC:    mg/L*hr    Predicted Steady State AUC on Current Dose:  mg/L*hr  _________________________________    Predicted Steady State AUC on New Dose:    mg/L*hr    Assessment/Plan: Pharmacy to dose vancomycin for sepsis in 54 yo F, 65\",93 kg, crcl 15.8.  Will give initial 20 mg/kg. A random level is ordered for 6/2.  Pharmacy will dose per levels.    "

## 2021-06-02 NOTE — PROCEDURES
"Insert Central Line At Bedside    Date/Time: 6/2/2021 5:28 AM  Performed by: Raquel Vega APRN  Authorized by: Raquel Vega APRN   Consent: Verbal consent obtained.  Risks and benefits: risks, benefits and alternatives were discussed  Consent given by: patient  Patient understanding: patient states understanding of the procedure being performed  Patient consent: the patient's understanding of the procedure matches consent given  Procedure consent: procedure consent matches procedure scheduled  Relevant documents: relevant documents present and verified  Test results: test results available and properly labeled  Site marked: the operative site was marked  Imaging studies: imaging studies available  Required items: required blood products, implants, devices, and special equipment available  Patient identity confirmed: arm band and hospital-assigned identification number  Time out: Immediately prior to procedure a \"time out\" was called to verify the correct patient, procedure, equipment, support staff and site/side marked as required.  Indications: vascular access    Anesthesia:  Local Anesthetic: lidocaine 1% without epinephrine    Sedation:  Patient sedated: yes  Vitals: Vital signs were monitored during sedation.    Preparation: skin prepped with ChloraPrep  Skin prep agent dried: skin prep agent completely dried prior to procedure  Sterile barriers: all five maximum sterile barriers used - cap, mask, sterile gown, sterile gloves, and large sterile sheet  Hand hygiene: hand hygiene performed prior to central venous catheter insertion  Location details: left internal jugular  Patient position: flat  Catheter type: triple lumen  Catheter size: 7 Fr  Pre-procedure: landmarks identified  Ultrasound guidance: yes  Sterile ultrasound techniques: sterile gel and sterile probe covers were used  Number of attempts: 1  Successful placement: yes  Post-procedure: line sutured and dressing applied  Assessment: blood return " through all ports,  free fluid flow,  placement verified by x-ray and no pneumothorax on x-ray  Patient tolerance: Patient tolerated the procedure well with no immediate complications

## 2021-06-02 NOTE — NURSING NOTE
WOC consult for: coccyx, excoriation around anais area, under breasts     Assessment and Care provided: Patient presents with multiple wounds as follows:  1:stage 1 linear pressure injury-of unknown origin. Goes across entire abdomen. It is red and does not kenzie.  2:MASD-perianal and perineal area. Large denuded area possibly from excess moisture. Pink and blanchable. Lightly cleaned with blue wipes which caused area to bleed quite a bit.     Recommendation(s): Keep stage 1 offloaded. Keep MASD open to air and apply zguard q shift and prn with soiling.    *Maintain good skin care, keep dry, turn q 2 hr with wedge if possible, keep heels elevated and offloaded with heel boots.    *Apply z-guard to bottom and bony prominences daily and as needed with incontinence episodes.  *Follow C.A.R.E protocol if medical devices (Bipap, mckeon, Ng tube, etc) are being used.      All skin interventions in place. Patient is on iso mattress. Head to toe assessment completed. WOC orders placed.     Discussed plan of care with RN.     Thank you for consulting WOCN.  Will sign off.  Please contact with questions or if needs arise.    1:stage 1 linear pressure injury-      2:MASD-perianal and perineal area

## 2021-06-02 NOTE — CASE MANAGEMENT/SOCIAL WORK
"  Continued Stay Note  [facility]     Patient Name: Laura Jang  MRN: [id number]  Today's Date: 6/2/2021    Admit Date: 6/1/2021    Discharge Plan     Row Name 06/02/21 1517       Plan    Plan   note    Plan Comments  Per MD notes:\"Patient is critically ill due to septic shock due to urosepsis with acute renal failure, shock liver, and persistent lactic acidosis in the face of the severe anemia.\"  Attempted to reach her sister by phone but was unsuccessful. SW left a message asking for a return call. Patient was independent of ADLs prior to the events leading to this admission. Will follow for PT/OT need once appropriate - Merle [phone]        Discharge Codes    No documentation.             Merle Multani RN    "

## 2021-06-02 NOTE — CONSULTS
[facility]    Referring Provider: No ref. provider found    Primary Care Provider: Renetta Santizo MD    Reason for Consultation: Severe acute liver injury    Chief complaint: weakness    History of present illness:  Laura Jang is a 55 y.o. female who is admitted with severe sepsis.  She was found to have severe hyponatremia, severe metabolic acidosis as well as acute liver injury.  She states she drinks about 4 beers a day.  Has been cutting back from about 8.  No history of liver disease.  No family for liver disease.  States she has been vaccinated for hepatitis a and B.  She has a history of a LAP-BAND has also had a history of sleeve gastrectomy.  Had cholecystectomy at that time.  She does complain of some right lower quadrant pain since admission.  States her last colonoscopy was in the last year.    She was found down surrounded by beer cans and was incontinent.  States she is feeling better today.  No nausea or vomiting.  No loss of appetite.  No fever chills.    Allergies:  Patient has no known allergies.    Scheduled Meds:  folic acid (FOLVITE) IVPB, 1 mg, Intravenous, Daily  insulin regular, 0-7 Units, Subcutaneous, Q6H  multivitamin with minerals, 1 tablet, Oral, Daily  pantoprazole, 40 mg, Intravenous, Q12H  piperacillin-tazobactam, 3.375 g, Intravenous, Q12H  sodium chloride, 10 mL, Intravenous, Q12H  thiamine (VITAMIN B1) IVPB, 500 mg, Intravenous, Q8H  [START ON 6/3/2021] vancomycin (dosing per levels), , Does not apply, Daily  vancomycin, 1,500 mg, Intravenous, Once         Infusions:  dextrose 5 % and lactated Ringer's, 50 mL/hr, Last Rate: 50 mL/hr (06/02/21 0017)  norepinephrine, 0.02-0.3 mcg/kg/min, Last Rate: 0.24 mcg/kg/min (06/02/21 0907)  Pharmacy to dose vancomycin,         PRN Meds:  •  dextrose  •  dextrose  •  famotidine  •  glucagon (human recombinant)  •  Pharmacy to dose vancomycin  •  sodium chloride    Home Meds:  Medications Prior to Admission   Medication Sig  Dispense Refill Last Dose   • BIOTIN 5000 PO Take 5,000 mcg by mouth Daily.   Past Week at Unknown time   • Brexpiprazole (Rexulti) 0.5 MG tablet Take 0.5 mg by mouth Daily.   Past Week at Unknown time   • busPIRone (BUSPAR) 10 MG tablet Take 10 mg by mouth 2 (two) times a day.   Past Week at Unknown time   • cetirizine (zyrTEC) 10 MG tablet Take 10 mg by mouth Daily.   Past Week at Unknown time   • Cholecalciferol (VITAMIN D3) 125 MCG (5000 UT) capsule capsule Take 5,000 Units by mouth Daily.   Past Week at Unknown time   • cloNIDine (CATAPRES) 0.1 MG tablet Take 0.1 mg by mouth Daily.   Past Week at Unknown time   • doxepin (SINEquan) 10 MG capsule Take 10 mg by mouth Every Night.   Past Week at Unknown time   • FLUoxetine (PROzac) 20 MG capsule Take 20 mg by mouth Daily.   Past Week at Unknown time   • folic acid (FOLVITE) 1 MG tablet Take 1 tablet by mouth Daily. 90 tablet 1 Past Week at Unknown time   • losartan (COZAAR) 50 MG tablet Take 1 tablet by mouth Daily. 30 tablet 0 Past Week at Unknown time   • vitamin B-12 (CYANOCOBALAMIN) 1000 MCG tablet Take 3,000 mcg by mouth Daily.   Past Week at Unknown time   • acyclovir (ZOVIRAX) 200 MG capsule Take  by mouth Every 4 (Four) Hours While Awake. 400mg the first day and 200mg for 4 days.   More than a month at Unknown time   • acyclovir (ZOVIRAX) 5 % cream Apply  topically to the appropriate area as directed Every 3 (Three) Hours.   More than a month at Unknown time   • B Complex-Biotin-FA (B-100 B-COMPLEX PO) Take  by mouth Daily.      • clindamycin-benzoyl peroxide (BENZACLIN) 1-5 % gel Apply  topically to the appropriate area as directed Daily. 50 g 11 More than a month at Unknown time   • Multiple Vitamins-Minerals (HAIR SKIN AND NAILS FORMULA PO) Take 2,500 mcg by mouth Daily.   More than a month at Unknown time   • Multiple Vitamins-Minerals (MULTIVITAMIN GUMMIES WOMENS) chewable tablet Chew 50 mg Daily.   More than a month at Unknown time       ROS: Review  "of Systems   Constitutional: Negative for appetite change, chills, fever and unexpected weight change.   Respiratory: Negative for shortness of breath.    Cardiovascular: Negative for chest pain and leg swelling.   Gastrointestinal: Positive for abdominal pain and vomiting. Negative for diarrhea.   Skin: Negative for color change.   Neurological: Positive for weakness.   All other systems reviewed and are negative.      PAST MED HX: Pt  has a past medical history of Alcohol abuse, Anxiety, Arthritis, Blistering of skin (3/1/2020), Degenerative disc disease, lumbar, Depression, and Hypertension.  PAST SURG HX: Pt  has a past surgical history that includes Bariatric Surgery; Hernia repair; Spine surgery; Cholecystectomy; Appendectomy; and Carpal tunnel release (Bilateral).  FAM HX: family history includes Arthritis in her mother and sister; Cancer in her maternal grandmother and mother; Hypertension in her brother, mother, and sister; Mental illness in her brother, mother, and sister; Obesity in her brother, mother, and sister.  SOC HX: Pt  reports that she has never smoked. She has never used smokeless tobacco. She reports current alcohol use of about 6.0 standard drinks of alcohol per week. She reports previous drug use.    BP (!) 83/53   Pulse 98   Temp 98.8 °F (37.1 °C) (Bladder)   Resp 18   Ht 165.1 cm (65\")   Wt 93 kg (205 lb)   SpO2 97%   BMI 34.11 kg/m²     Physical Exam  Wt Readings from Last 3 Encounters:   06/01/21 93 kg (205 lb)   03/10/21 92.8 kg (204 lb 9.6 oz)   02/05/21 90.4 kg (199 lb 4.7 oz)   ,body mass index is 34.11 kg/m².    General Well developed; well nourished; no acute distress.   ENT Good dentition.  Oral mucosa pink & moist without thrush or lesions.    Neck Neck supple; trachea midline. No thyromegaly  Resp CTA; no rhonchi, rales, or wheezes.  Respiration effort normal  CV RRR; ; no M/R/G.  1+ lower extremity edema  GI Abd soft, minimally tender, obese, ND, normal active bowel " sounds.Skin No rash; no lesions; no bruises.  Skin turgor normal  MSK No clubbing; no cyanosis.    Psych Oriented to time, place, and person.  Appropriate affect      Results Review:   I reviewed the patient's new clinical results.  I reviewed the patient's new imaging results and agree with the interpretation.    Lab Results   Component Value Date    WBC 9.13 06/02/2021    HGB 7.5 (L) 06/02/2021    HCT 22.0 (L) 06/02/2021    .3 (H) 06/02/2021     06/02/2021       Lab Results   Component Value Date    GLUCOSE 80 06/02/2021    BUN 20 06/02/2021    CREATININE 4.57 (H) 06/02/2021    EGFRIFNONA 10 (L) 06/02/2021    EGFRIFAFRI  06/02/2021      Comment:      <15 Indicative of kidney failure.    BCR 4.4 (L) 06/02/2021    CO2 15.0 (L) 06/02/2021    CALCIUM 8.0 (L) 06/02/2021    ALBUMIN 3.10 (L) 06/02/2021    AST 1,789 (H) 06/02/2021     (H) 06/02/2021   Results for RADHA PATIÑO (MRN [id number]) as of 6/2/2021 08:54   Ref. Range 3/25/2020 08:53 8/28/2020 09:44 6/1/2021 17:36 6/2/2021 05:53   AST (SGOT) Latest Ref Range: 1 - 32 U/L 33 (H) 35 (H) 2,211 (H) 1,789 (H)     Results for RADHA PATIÑO (MRN [id number]) as of 6/2/2021 08:54   Ref. Range 6/1/2021 22:20 6/2/2021 05:53   INR Latest Ref Range: 0.85 - 1.16  2.82 (H) 3.68 (H)   Results for RADHA PATIÑO (MRN [id number]) as of 6/2/2021 08:54   Ref. Range 6/1/2021 23:54   Hep A IgM Latest Ref Range: Non-Reactive  Non-Reactive   Hepatitis B Surface Ag Latest Ref Range: Non-Reactive  Non-Reactive   Hep B Core IgM Latest Ref Range: Non-Reactive  Non-Reactive   Hepatitis C Ab Latest Ref Range: Non-Reactive  Non-Reactive   HIV-1/ HIV-2 Ab Latest Ref Range: Non-Reactive  Non-Reactive   Results for RADHA PATIÑO (MRN [id number]) as of 6/2/2021 08:54   Ref. Range 6/3/2020 10:52 6/4/2020 14:56 6/1/2021 17:36 6/1/2021 22:20 6/1/2021 23:54 6/2/2021 03:43 6/2/2021 05:53   Ammonia Latest Ref Range: 11 - 51 umol/L    58 (H)      Amylase Latest Ref Range: 28 - 100 U/L   56        Lactate Latest Ref Range: 0.5 - 2.0 mmol/L   9.7 (C) 9.1 (C)   8.7 (C)   Lipase Latest Ref Range: 13 - 60 U/L   74 (H)       Magnesium Latest Ref Range: 1.6 - 2.6 mg/dL   1.6  1.5 (L) 2.5 4.4 (H)   Osmolality Latest Ref Range: 275 - 295 mOsm/kg   251 (L)       Procalcitonin Latest Ref Range: 0.00 - 0.25 ng/mL   3.31 (H)    2.89 (H)   Vitamin B1, Whole Blood Latest Ref Range: 66.5 - 200.0 nmol/L 225.2 (H)         Vitamin B6 Latest Ref Range: 2.0 - 32.8 ug/L  106.6 (H)        Results for RADHA PATIÑO (MRN [id number]) as of 6/2/2021 08:54   Ref. Range 6/1/2021 17:36 6/2/2021 05:53   Creatine Kinase Latest Ref Range: 20 - 180 U/L 668 (H)    Troponin T Latest Ref Range: 0.000 - 0.030 ng/mL 0.023    proBNP Latest Ref Range: 0.0 - 900.0 pg/mL 3,736.0 (H) 3,990.0 (H)       CT abdomen pelvis per radiology report:       FINDINGS:  Included lung bases are clear. Heart size is the upper limits of normal. Moderate-sized hiatal hernia demonstrated.     Abdomen: The liver shows evidence of severe hepatic steatosis. The outline appears mildly nodular suggesting hepatic cirrhosis. The gallbladder is surgically absent. The spleen and pancreas appear unremarkable. No adrenal abnormalities. The kidneys show  no evidence of hydronephrosis. No renal or ureteral calculi.     Evidence of prior bariatric surgery.     Pelvis: There is evidence of increased attenuation within the mesenteric fat just below the aortic bifurcation. This can be seen in mesenteric panniculitis. This can also be seen in cirrhosis. Please correlate clinically.     The bowel pattern is nonobstructive. No free air. No free fluid. The urinary bladder outline is smooth.     Regional osseous structures show no acute or aggressive bone lesions. No threshold of abdominal or pelvic adenopathy.     IMPRESSION:     1.  Severe hepatic steatosis. The outline of the liver appears mildly nodular suggesting hepatic cirrhosis.     2.  There is increased attenuation in the  "mesenteric fat just below the aortic bifurcation. This \"so mesentery\" can be seen in mesenteric panniculitis. This can also be seen in hepatic cirrhosis. Please correlate clinically.           ASSESSMENTS/PLANS    1.  Transaminitis  2.  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  EtOH abuse  4.  Hyponatremia  5.  ABDI  6.  Metabolic acidosis  7.  Elevated creatinine kinase  8.  Elevated proBNP  9.  Status post sleeve gastrectomy, cholecystectomy  10.  Coagulopathy  7.  Malnutrition      Suspect patient has BRITO cirrhosis compounded by EtOH abuse.  Her marked increase of LFTs are related to shock liver +/- rhabdo.  Should see a rapid improvement in liver function test if this is the case.    Will test for immunity to hepatitis A and B and vaccinate if not immune.  Her vitamin D level is normal.    Will need to discuss nutrition at discharge with protein bedtime snack, high-protein diet, weight loss as well as avoiding raw shellfish.    Will watch for withdrawal but suspect most of her liver disease is related to her BRITO    Suspect her \" so mesentery \"is related to her cirrhosis.    Will give vitamin K and recheck INR tomorrow.    Resume diet with protein bedtime snack    I discussed the patient's findings and my recommendations with patient and primary care team    Gianni Martinez MD  06/02/21  09:10 EDT  "

## 2021-06-02 NOTE — H&P (VIEW-ONLY)
INTENSIVIST / PULMONARY INITIAL VISIT (CONSULT / H&P) NOTE     Hospital:  LOS: 0 days   Ms. Laura Jang, 55 y.o. female is followed for:   Chief Complaint   Patient presents with   • Weakness - Generalized   • Hypotension   • Hypoglycemia   • Alcohol Problem       Alcoholism (CMS/HCC)    Moderate episode of recurrent major depressive disorder (CMS/HCC)    Elevated lactic acid level    Hyponatremia    Hypokalemia    Hypochloremia    ABDI (acute kidney injury) (CMS/HCC)    Elevated lipase    Hypoproteinemia (CMS/HCC)    Elevated bilirubin    Leukocytosis    Anemia    UTI (urinary tract infection)         History of Present Illness   Ms. Jang is a 54 yo female with PMH extensive alcoholism, anxiety/depression, DDD and HTN who presented to ED via EMS after being found down. According to patient and documentation, patient lost her job 4 weeks ago. Since then her drinking has worsened. About 2 weeks ago she decided she needed to wean her self off alcohol and with the aid of her therapist has been doing fairly well. Her therapist changed one of her medications (unclear which one) from daily to BID and since then she has noticed an increase in lethargy/weakness. Today a friend went to check on her after they hadn't heard from her in a few weeks. The found her down in her home surrounded by beer cans with fecal incontenance. EMS was called and she was brought to ED for evaluation. Patient notes the last time she took her new medication was 4 days ago.    VS on arrival: HR 87, tep 98.2, RR 18, /52, SpO2 96%. Labs of significance include: lipase 74, , Tylenol < 5, BNP 3736, lactate 9.7, Urine osmo 249, Serum osmo 251, covid negative, UA + ketones, bili, blood, protein, leuk esterase, nitrites, WBC and bacteria, UDS/ETOH/Salicylate/Tylenol all negative, glucose 124, BUN 18, creat 4.48, Na 107, K 3, Chloride 64, Co2 16, total protein 5.5, AST 2211, , Alk phos 139, bili 6.7, GFR < 10, anion gap 27, troponin  0.023, Mag 1.6, WBC 12.97, HgB 8.6, Plts 183.    CT Head Noncontributory. CXR with enlarged cardiac silhouette as well as increased central vascular congestion, no overt edema or pleural effusions.     In ED she was given a dose of Ceftriaxone. With her significant lab abnormalities as well as potential for decompensation, she will be admitted to ICU for management.     On my assessment patient is alert and oriented. Scleral icterus, no abdominal distention, no significant ascites. VSS.    Past Medical History:   Diagnosis Date   • Alcohol abuse    • Anxiety    • Arthritis    • Blistering of skin 3/1/2020   • Degenerative disc disease, lumbar    • Depression    • Hypertension      Past Surgical History:   Procedure Laterality Date   • APPENDECTOMY     • BARIATRIC SURGERY      2005, gastric sleeve   • CARPAL TUNNEL RELEASE Bilateral     07/2020   • CHOLECYSTECTOMY     • HERNIA REPAIR      2014   • SPINE SURGERY      cyst removal     Family History   Problem Relation Age of Onset   • Arthritis Mother    • Cancer Mother    • Obesity Mother    • Mental illness Mother    • Hypertension Mother    • Arthritis Sister    • Obesity Sister    • Mental illness Sister    • Hypertension Sister    • Obesity Brother    • Mental illness Brother    • Hypertension Brother    • Cancer Maternal Grandmother    • Breast cancer Neg Hx    • Ovarian cancer Neg Hx      Social History     Socioeconomic History   • Marital status:      Spouse name: Not on file   • Number of children: Not on file   • Years of education: Not on file   • Highest education level: Not on file   Tobacco Use   • Smoking status: Never Smoker   • Smokeless tobacco: Never Used   Substance and Sexual Activity   • Alcohol use: Yes     Alcohol/week: 6.0 standard drinks     Types: 6 Cans of beer per week     Comment: quit 02/18/2020, prior heavy alcohol use for 15y   • Drug use: Not Currently   • Sexual activity: Not Currently     Partners: Male     Birth  control/protection: Post-menopausal     No Known Allergies  No current facility-administered medications on file prior to encounter.     Current Outpatient Medications on File Prior to Encounter   Medication Sig Dispense Refill   • BIOTIN 5000 PO Take 5,000 mcg by mouth Daily.     • Brexpiprazole (Rexulti) 0.5 MG tablet Take 0.5 mg by mouth Daily.     • busPIRone (BUSPAR) 10 MG tablet Take 10 mg by mouth 2 (two) times a day.     • cetirizine (zyrTEC) 10 MG tablet Take 10 mg by mouth Daily.     • Cholecalciferol (VITAMIN D3) 125 MCG (5000 UT) capsule capsule Take 5,000 Units by mouth Daily.     • cloNIDine (CATAPRES) 0.1 MG tablet Take 0.1 mg by mouth Daily.     • doxepin (SINEquan) 10 MG capsule Take 10 mg by mouth Every Night.     • FLUoxetine (PROzac) 20 MG capsule Take 20 mg by mouth Daily.     • folic acid (FOLVITE) 1 MG tablet Take 1 tablet by mouth Daily. 90 tablet 1   • losartan (COZAAR) 50 MG tablet Take 1 tablet by mouth Daily. 30 tablet 0   • vitamin B-12 (CYANOCOBALAMIN) 1000 MCG tablet Take 3,000 mcg by mouth Daily.     • acyclovir (ZOVIRAX) 200 MG capsule Take  by mouth Every 4 (Four) Hours While Awake. 400mg the first day and 200mg for 4 days.     • acyclovir (ZOVIRAX) 5 % cream Apply  topically to the appropriate area as directed Every 3 (Three) Hours.     • B Complex-Biotin-FA (B-100 B-COMPLEX PO) Take  by mouth Daily.     • clindamycin-benzoyl peroxide (BENZACLIN) 1-5 % gel Apply  topically to the appropriate area as directed Daily. 50 g 11   • Multiple Vitamins-Minerals (HAIR SKIN AND NAILS FORMULA PO) Take 2,500 mcg by mouth Daily.     • Multiple Vitamins-Minerals (MULTIVITAMIN GUMMIES WOMENS) chewable tablet Chew 50 mg Daily.     • [DISCONTINUED] magnesium, as, gluconate (MAGONATE) 500 (27 Mg) MG tablet Take 125 mg by mouth 2 (Two) Times a Day.     • [DISCONTINUED] naltrexone (DEPADE) 50 MG tablet          ROS:  Per HPI, all other systems were reviewed and were negative        OBJECTIVE     Vital  Sign Min/Max for last 24 hours  Temp  Min: 98.2 °F (36.8 °C)  Max: 98.2 °F (36.8 °C)   BP  Min: 92/52  Max: 116/41   Pulse  Min: 87  Max: 99   Resp  Min: 18  Max: 18   SpO2  Min: 87 %  Max: 100 %   No data recorded     Telemetry:              General Appearance:  Conversant, in no acute distress  Eyes:  Scleral icterus  Ears, Nose, Mouth, Throat:  Atraumatic, oropharynx clear  Neck:  Trachea midline, thyroid normal  Respiratory:  Clear to auscultation bilaterally, normal effort, no tenderness to palpation  Cardiovascular:  Regular rate and rhythm, no murmurs, no peripheral edema, no thrill  Gastrointestinal:  Soft, nontender, nondistended, no hepatosplenomegaly  Skin:  Normal temperature, no rash  Psychiatric:  Alert and oriented, though confused  Neuro:  No new focal neurologic deficits observed, no tremor or tongue fasiculations    SpO2: 100 % SpO2  Min: 87 %  Max: 100 %   Device:      Flow Rate:   No data recorded     Mechanical Ventilator Settings:                                         Intake/Ouptut 24 hrs (7:00AM - 6:59 AM)  Intake & Output (last 3 days)       05/30 0701 - 05/31 0700 05/31 0701 - 06/01 0700 06/01 0701 - 06/02 0700    I.V. (mL/kg)   200 (2.2)    Total Intake(mL/kg)   200 (2.2)    Net   +200                   Lines, Drains & Airways    Active LDAs     Name:   Placement date:   Placement time:   Site:   Days:    Peripheral IV 06/01/21 1656 Left Antecubital   06/01/21 1656    Antecubital   less than 1    Peripheral IV 06/01/21 1958 Right Antecubital   06/01/21 1958    Antecubital   less than 1                Hematology:  Results from last 7 days   Lab Units 06/01/21  1736   WBC 10*3/mm3 12.97*   HEMOGLOBIN g/dL 8.6*   HEMATOCRIT % 25.0*   PLATELETS 10*3/mm3 183   MONOCYTES % % 6.0     Electrolytes, Magnesium and Phosphorus:  Results from last 7 days   Lab Units 06/01/21  1736   SODIUM mmol/L 107*   POTASSIUM mmol/L 3.0*   CO2 mmol/L 16.0*   MAGNESIUM mg/dL 1.6   PHOSPHORUS mg/dL 4.3      Renal:  Results from last 7 days   Lab Units 06/01/21  1736   CREATININE mg/dL 4.48*   BUN mg/dL 18     Estimated Creatinine Clearance: 15.8 mL/min (A) (by C-G formula based on SCr of 4.48 mg/dL (H)).  Estimated Creatinine Clearance: 15.8 mL/min (A) (by C-G formula based on SCr of 4.48 mg/dL (H)).  Hepatic:  Results from last 7 days   Lab Units 06/01/21  1736   ALK PHOS U/L 139*   BILIRUBIN mg/dL 6.7*   ALT (SGPT) U/L 453*   AST (SGOT) U/L 2,211*     Arterial Blood Gases:              Lab Results   Component Value Date    LACTATE 9.7 (C) 06/01/2021       CT Head Without Contrast    Result Date: 6/1/2021  Narrative: EXAMINATION: CT HEAD WO CONTRAST-  INDICATION: Mental status change, unknown cause  TECHNIQUE: CT head without intravenous contrast  The radiation dose reduction device was turned on for each scan per the ALARA (As Low as Reasonably Achievable) protocol.  COMPARISON: NONE  FINDINGS: Midline structures are symmetric without evidence of mass, mass effect or midline shift. Ventricles and sulci within normal limits. No intra-axial major extra-axial fluid collection. Globes and orbits unremarkable. Paranasal sinuses and mastoid cells are grossly clear well-pneumatized. Calvarium intact.        Impression: No acute intracranial findings.       XR Chest 1 View    Result Date: 6/1/2021  Narrative:  EXAMINATION: XR CHEST 1 VW-  INDICATION: Weak/Dizzy/AMS triage protocol  COMPARISON: NONE  FINDINGS: Cardiac size enlarged with increased perihilar lung markings of central vascular congestion without overt edema or pleural effusion        Impression: Cardiac size enlarged with increased central vascular congestion however no overt edema or pleural effusion of decompensation evident         Relevant imaging studies and labs from 06/01/21 were reviewed and interpreted by me    Medications (drips):  Pharmacy Consult - Pharmacy to dose  Pharmacy to dose vancomycin  sodium chloride        cefTRIAXone, 1 g, Intravenous,  "Once  pantoprazole, 40 mg, Intravenous, Q12H  [START ON 6/2/2021] piperacillin-tazobactam, 2.25 g, Intravenous, Q8H  piperacillin-tazobactam, 3.375 g, Intravenous, Once  sodium chloride, 10 mL, Intravenous, Q12H  vancomycin, 20 mg/kg, Intravenous, Once        Assessment/Plan   IMPRESSION / PLAN     Inpatient Problem List:  55 y.o.female:  Active Hospital Problems    Diagnosis    • Elevated lactic acid level    • Hyponatremia    • Hypokalemia    • Hypochloremia    • ABDI (acute kidney injury) (CMS/HCC)    • Elevated lipase    • Hypoproteinemia (CMS/HCC)    • Elevated bilirubin    • Leukocytosis    • Anemia    • UTI (urinary tract infection)    • Moderate episode of recurrent major depressive disorder (CMS/HCC)    • Alcoholism (CMS/HCC)         Impression:  55 y.o.female with relevant PMH of Alcoholism, depression, HTN, Obesity admitted 6/1/2021 after being \"found down\" surrounded by beer cans and with incontinence.  Found to have numerous abnormalities on labs:  Severe hyponatremia, ABDI, severe elevation in LFTs, lactic acidosis, +UA.    Overall presentation concerning for severe sepsis w/ likely urinary source, complicated by ABDI, Rhabdo, Shock liver +/- Alcoholic Hepatitis, AKA + Lactic Acidosis, Macrocytic anemia w/ hgb 8.6, and Severe Hyponatremia.  I suspect the hyponatremia is from beer potomania.  Serum Osm / Urine Osm ration not c/w SIADH, TSH & Cortisol normal.  Could also be from more chronic advanced liver disease, clearly has fatty liver on CT.    Plan:  Severe Sepsis - BS abx, pan-culture, MRSA PCR.  Likely urinary source.  Though alk phos / bili elevated, has previously had cholecystectomy.  Amylase / Lipase normal. No evidence for pneumonia on CT Abd or CXR.      ABDI - mckeon, urine studies, IVF    Severe Hyponatremia - as above suspect beer potomania / liver disease.  Give isotonic crystalloid.  Goal sodium 113-115 in 24 hours.  If she overcorrects will need D5W bolus.  Check sodium q2h " "overnight.    Severe Metabolic Acidosis / Lactic Acidosis - likely some component of AKA and Tybe B lactic acidosis.  Add dextrose to IVF.  High dose IV thiamine.    Acute Liver Injury - suspect shock liver on background of BRITO.  Monitor LFTs closely / INR closely.  If not improved in am would probably benefit from GI evaluation.  Hold off on steroids for tonight for alcoholic hepatitis component.  Check hepatitis panel.    Elevated BNP - Echo in am    Alcoholism - monitor for impending withdrawal.  She changed her timeline several times when asked when her last drink was and was found \"surrounded by beer cans\".  Order CIWA protocol prn.  Hold off on scheduled benzos for now.  Can also use precedex if needed for agitation.    Glycemic control    Hold home psych medicines, could possibly contribute to hyponatremia and/or liver injury    Patient is Critically Ill due to MOF as outlined above with high risk of decompensation and death.    GI/PUD prophylaxis    NPO for now except meds    Critical Care time spent in direct patient care: 60 minutes (excluding procedure time, if applicable) including high complexity decision making to assess, manipulate, and support vital organ system failure in this individual who has impairment of one or more vital organ systems such that there is a high probability of imminent or life threatening deterioration in the patient’s condition.       Wilmer Garsia MD  Intensive Care Medicine  06/01/21 21:32 EDT       "

## 2021-06-02 NOTE — PROGRESS NOTES
Clinical Nutrition     Reason for Visit: MDR, Identified at risk by screening criteria    Patient Name: Laura Jang  YOB: 1965  MRN: [id number]  Date of Encounter: 06/02/21 12:04 EDT  Admission date: 6/1/2021    Nutrition Assessment   Admission Problem List:  Sepsis  UTI  Cirrhosis  ABDI  Anemia  Lactic Acidosis  Hyponatremia    PMH:   She  has a past medical history of Alcohol abuse, Anxiety, Arthritis, Blistering of skin (3/1/2020), Degenerative disc disease, lumbar, Depression, and Hypertension.  PSxH:  She  has a past surgical history that includes Bariatric Surgery; Hernia repair; Spine surgery; Cholecystectomy; Appendectomy; and Carpal tunnel release (Bilateral).    Substance History: Etoh abuse    Reported/Observed/Food/Nutrition Related History:     Pt resting in bed, eating breakfast, is on room air, very weak, D5%LR@50ml/hr, levophed 0.3mcg    Anthropometrics   Height: 65in  Weight: 205lb stated  BMI: 34  BMI classification: Obese Class I: 30-34.9kg/m2       Labs reviewed     Results from last 7 days   Lab Units 06/02/21  0915 06/02/21  0553   SODIUM mmol/L 114* 111*   POTASSIUM mmol/L 3.6 3.1*   CHLORIDE mmol/L 74* 71*   CO2 mmol/L 14.0* 15.0*   BUN mg/dL 21* 20   CREATININE mg/dL 4.73* 4.57*   CALCIUM mg/dL 8.3* 8.0*   BILIRUBIN mg/dL  --  6.3*   ALK PHOS U/L  --  116   ALT (SGPT) U/L  --  385*   AST (SGOT) U/L  --  1,789*   GLUCOSE mg/dL 59* 80       Results from last 7 days   Lab Units 06/02/21  0559 06/02/21  0156 06/02/21  0014 06/01/21  2353 06/01/21  1719   GLUCOSE mg/dL 78 119 150* 61* 176*     Lab Results   Lab Value Date/Time    HGBA1C 5.20 03/25/2020 0853       Medications reviewed   Pertinent:abx, albumin, folate, MVI, thiamine, D5%LR@50ml, levophed    Intake/Ouptut 24 hrs (7:00AM - 6:59 AM)     Intake & Output (last day)       06/01 0701 - 06/02 0700 06/02 0701 - 06/03 0700    I.V. (mL/kg) 808.4 (8.7)     IV Piggyback 200     Total Intake(mL/kg) 1008.4 (10.8)      Urine (mL/kg/hr) 550     Total Output 550     Net +458.4                      GI:wnl    SKIN:excoriated, coccyx- skin tear    Current Nutrition Prescription     PO: Renal Diabetic    Novasource Renal @[facility]    Intake: no data, diet just ordered      Nutrition Diagnosis   Date: 6-2-21  Problem Predicted suboptimal energy intake   Etiology Per Clinical Status   Signs/Symptoms Pt found down at home, extremely weak     Nutrition Intervention   1.  Follow treatment progress, Menu adjusted    As pt maxed on levophed will change to Clear Liquid Diet- Renal Diabetic    1L fluid restriction per MD Cornel Canales 3x/day and HS    Goal:   General: Nutrition support treatment  PO: Establish PO    Monitoring/Evaluation:   Per protocol    Nohemy Dickinson RD  Time Spent: 30min

## 2021-06-02 NOTE — PROGRESS NOTES
"Intensivist Note     6/2/2021  Hospital Day: 1  * No surgery found *  ICU Stays Timeline            Hospital Admission: 06/01/21 1712 - Current  ICU stays: 1      In Date/Time Event Department ICU Stay Duration     06/01/21 1712 Admission  [facility]      06/01/21 2317 Transfer In  [facility] 10 hours 48 minutes             Ms. Laura Jang, 55 y.o. female is followed for:    Alcoholism (CMS/HCC)    Moderate episode of recurrent major depressive disorder (CMS/HCC)    Elevated lactic acid level    Hyponatremia    Hypokalemia    Hypochloremia    ABDI (acute kidney injury) (CMS/HCC)    Elevated lipase    Hypoproteinemia (CMS/HCC)    Elevated bilirubin    Leukocytosis    Anemia    UTI (urinary tract infection)    Acute renal failure (CMS/HCC)       SUBJECTIVE     55-year-old white female non-smoker with a questionable history of alcoholism, hypertension, obesity and both sleeve gastrectomy lap band as well as cholecystectomy, anxiety/depression, and DDD.  Apparently lost her job 4 weeks ago and has had increase alcohol intake.  With the help of her therapist, 2 weeks ago began to wean off alcohol and began to note increasing weakness and lethargy.  Was also apparently begun on unknown \"new medication\".  On 6/1/2021 a friend who had not talked to her in several days went by patient's house and found her on the floor surrounded by beer cans with fecal incontinence.  Upon EMS arrival glucose was in the 30s and patient was hypotensive with a SBP in the 80s.  D50 led to improvement in mentation and she was transferred to [facility] ER.  In the ER hematocrit 25, WBC 12.97 with a left shift, procalcitonin 3.31, platelets 183, sodium 107, potassium 3.0, chloride 64, bicarb 16, BUN 18, creatinine 4.48, alk phos 139, , AST 2,211, bilirubin 6.7, albumin 3.0, cortisol 65, and amylase 56. ProBNP 3,736.  ABGs revealed pH 7.42, PCO2 24, PO2 80 on room air.  Lactic acid 9.2.  Urinalysis revealed too TNTC  WBCs and 4+ bacteria.  " "Patient was cultured up and received a dose of Zosyn but never received vancomycin.  Was also begun on fluids and pressors.    Interval history: Patient profoundly weak and debilitated but is able to answer questions appropriately and indicates that she feels better (by this she indicates she she feels stronger).  Hemodynamically she remains hypotensive and mildly tachycardic with a blood pressure 89/43 and heart rate of 108 on a significant dose of norepinephrine.  CVP today however is only 5 and hematocrit has dropped to 22 although there has been no obvious GI losses.  She denies nausea, vomiting, abdominal pain, diarrhea, melena, or hematochezia.  Labs today reveal drop in hematocrit to 22, WBC remains normal but has a left shift, platelets are normal.  INR is 3.68, procalcitonin is improved but is still 2.89, and lactate has decreased slightly to 8.7.  Bilirubin remains elevated at 6.3 but AST is decreased to 1789.  Patient remains acidotic with a serum bicarb of 14 and remains in renal failure with a creatinine of 4.73 (although BUN only 21).  Urine culture has already returned growing greater than 100,000 gram-negative rods.  Blood culture is pending      ROS: Unable to obtain due to acuity of illness    The patient's relevant PMH, PSH, FH, and SH were reviewed and updated in Epic as appropriate. Allergies and Medications reviewed.    OBJECTIVE     /46   Pulse 98   Temp 98.8 °F (37.1 °C) (Bladder)   Resp 18   Ht 165.1 cm (65\")   Wt 93 kg (205 lb)   SpO2 100%   BMI 34.11 kg/m²      Presently on room air    Flowsheet Rows      First Filed Value   Admission Height  165.1 cm (65\") Documented at 06/01/2021 1730   Admission Weight  93 kg (205 lb) Documented at 06/01/2021 1730        Intake & Output (last day)       06/01 0701 - 06/02 0700 06/02 0701 - 06/03 0700    I.V. (mL/kg) 808.4 (8.7)     IV Piggyback 200     Total Intake(mL/kg) 1008.4 (10.8)     Urine (mL/kg/hr) 550     Total Output 550     Net " +458.4                 Exam:  General Exam:  Acute on chronically ill white female appearing far older than stated age  HEENT: Pupils equal and reactive.  Mild conjunctival jaundice.  Nose and throat clear.  Neck:                          Supple, no JVD, thyromegaly, or adenopathy  Lungs: Clear to auscultation and percussion anteriorly and posteriorly.  No wheezes or rales  Cardiovascular: RRR without murmurs or gallops.   bpm  Abdomen: Protuberant/obese but no obvious hepatosplenomegaly   and rectal: Nunes catheter in place  Extremities: No cyanosis clubbing edema.  Small ecchymoses of forearms  Neurologic:                 Symmetric strength but profoundly and diffusely weak. No focal deficits.    Chest X-Ray: Cardiomegaly.  Mild atelectasis/infiltrate in the bases left greater than right.  Left IJ line in place    INFUSIONS  dextrose 5 % and lactated Ringer's, 50 mL/hr, Last Rate: 50 mL/hr (06/02/21 0017)  norepinephrine, 0.02-0.3 mcg/kg/min, Last Rate: 0.3 mcg/kg/min (06/02/21 1035)        Results from last 7 days   Lab Units 06/02/21  0553 06/01/21  1736   WBC 10*3/mm3 9.13 12.97*   HEMOGLOBIN g/dL 7.5* 8.6*   HEMATOCRIT % 22.0* 25.0*   PLATELETS 10*3/mm3 194 183     Results from last 7 days   Lab Units 06/02/21  0915 06/02/21  0553   SODIUM mmol/L 114* 111*   POTASSIUM mmol/L 3.6 3.1*   CHLORIDE mmol/L 74* 71*   CO2 mmol/L 14.0* 15.0*   BUN mg/dL 21* 20   CREATININE mg/dL 4.73* 4.57*   GLUCOSE mg/dL 59* 80   CALCIUM mg/dL 8.3* 8.0*     Results from last 7 days   Lab Units 06/02/21  0553 06/02/21  0343 06/01/21  2354 06/01/21  1736   MAGNESIUM mg/dL 4.4* 2.5 1.5* 1.6   PHOSPHORUS mg/dL 3.1  --   --  4.3     Results from last 7 days   Lab Units 06/02/21  0553 06/01/21  1736   ALK PHOS U/L 116 139*   BILIRUBIN mg/dL 6.3* 6.7*   ALT (SGPT) U/L 385* 453*   AST (SGOT) U/L 1,789* 2,211*       No results found for: SEDRATE  No results found for: BNP  Lab Results   Component Value Date    CKTOTAL 668 (H)  06/01/2021    TROPONINT 0.023 06/01/2021     Lab Results   Component Value Date    TSH 3.750 06/01/2021    TSH 3.750 06/01/2021     Lab Results   Component Value Date    LACTATE 8.7 (C) 06/02/2021     Lab Results   Component Value Date    CORTISOL 65.14 06/01/2021       Results from last 7 days   Lab Units 06/02/21  0634   PH, ARTERIAL pH units 7.421   PCO2, ARTERIAL mm Hg 23.9*   PO2 ART mm Hg 80.5*   HCO3 ART mmol/L 15.5*   FIO2 % 21         I reviewed the patient's results, images and medication.    Assessment/Plan   ASSESSMENT        Alcoholism (CMS/[facility])    Moderate episode of recurrent major depressive disorder (CMS/[facility])    Elevated lactic acid level    Hyponatremia    Hypokalemia    Hypochloremia    ABDI (acute kidney injury) (CMS/[facility])    Elevated lipase    Hypoproteinemia (CMS/[facility])    Elevated bilirubin    Leukocytosis    Anemia    UTI (urinary tract infection)    Acute renal failure (CMS/[facility])      DISCUSSION: It appears the scenario is that she developed gram-negative UTI with urosepsis and hypotension all in the face of underlying fatty liver and alcoholism.  Apparently collapsed at home and due to persistent hypotension developed an acute kidney injury.  On top of this she is severely anemic probably due to some GI losses although we have not seen any as of yet    PLAN     1.  Needs volume in view of her septic shock and since she is anemic PRBC would be best  2.  Additional fluids on top of above as needed  3.  Will allow clear liquids since she is alert  4.  Continue Zosyn and adjust based upon final results of culture from her gram-negative flora  5.  Single dose of bicarb as if we improve her pH, her norepinephrine will be more effective  6.  Hopefully her renal function will rebound  7.  Shock liver already appears slightly improved with decrease in LFTs    Plan of care and goals reviewed with multidisciplinary team at daily rounds.    I discussed the patient's findings and my recommendations with  patient, nursing staff and consulting provider    Patient is critically ill due to septic shock due to urosepsis with acute renal failure, shock liver, and persistent lactic acidosis in the face of the severe anemia.  She has a high risk of life-threatening decline in condition and requires continuous monitoring and frequent reassessment of condition for adjustment of management in order to lessen risk.  I visited the patient's bedside and interacted with nurse numerous times today.    Time spent Critical care 40 min (It does not include procedure time).    Electronically signed by Candido Capps MD, 06/02/21, 10:05 AM EDT.   Pulmonary / Critical care medicine     Electronically signed by Candido Capps MD, 06/02/21, 10:05 AM EDT.

## 2021-06-02 NOTE — PAYOR COMM NOTE
"Id # [id number]  Alesha Garsia RN, BSN  Phone # [phone]  Fax # [phone]  PatiñoRadha (55 y.o. Female)     Date of Birth Social Security Number Address Home Phone MRN    1965  [id number] [facility] [phone] [id number]    Adventism Marital Status          Buddhism        Admission Date Admission Type Admitting Provider Attending Provider Department, Room/Bed    6/1/21 Emergency Wilmer Garsia MD Thompson, Patton Weddington, MD [facility] 2HSIC, S260/1    Discharge Date Discharge Disposition Discharge Destination                       Attending Provider: Wilmer Garsia MD    Allergies: No Known Allergies    Isolation: None   Infection: MRSA (06/02/21)   Code Status: CPR    Ht: 165.1 cm (65\")   Wt: 93 kg (205 lb)    Admission Cmt: None   Principal Problem: Sepsis and septic shock due to Gram negative bacteria.  Urinary tract origin (CMS/formerly [facility]) [A41.50]                 Active Insurance as of 6/1/2021     Primary Coverage     Payor Plan Insurance Group Employer/Plan Group    [employer]HE[employer] BLUE CROSS ANTHEM BLUE CROSS BLUE SHIELD PPO [id number]     Payor Plan Address Payor Plan Phone Number Payor Plan Fax Number Effective Dates    [address] [phone]  1/1/2020 - None Entered    [facility] [id number]       Subscriber Name Subscriber Birth Date Member ID       RADHA PATIÑO 1965 [id number]           Secondary Coverage     Payor Plan Insurance Group Employer/Plan Group    [facility]      Payor Plan Address Payor Plan Phone Number Payor Plan Fax Number Effective Dates    [address] [phone]  3/25/2020 - None Entered    [facility] [id number]       Subscriber Name Subscriber Birth Date Member ID       HAROONELIASRADHA 1965 [id number]                    History & Physical      Wilmer Garsia MD at 06/01/21 2122            INTENSIVIST / PULMONARY INITIAL VISIT (CONSULT / H&P) NOTE     Hospital:  LOS: 0 days "   Ms. Laura Jang, 55 y.o. female is followed for:   Chief Complaint   Patient presents with   • Weakness - Generalized   • Hypotension   • Hypoglycemia   • Alcohol Problem       Alcoholism (CMS/HCC)    Moderate episode of recurrent major depressive disorder (CMS/HCC)    Elevated lactic acid level    Hyponatremia    Hypokalemia    Hypochloremia    ABDI (acute kidney injury) (CMS/HCC)    Elevated lipase    Hypoproteinemia (CMS/HCC)    Elevated bilirubin    Leukocytosis    Anemia    UTI (urinary tract infection)         History of Present Illness   Ms. Jang is a 56 yo female with PMH extensive alcoholism, anxiety/depression, DDD and HTN who presented to ED via EMS after being found down. According to patient and documentation, patient lost her job 4 weeks ago. Since then her drinking has worsened. About 2 weeks ago she decided she needed to wean her self off alcohol and with the aid of her therapist has been doing fairly well. Her therapist changed one of her medications (unclear which one) from daily to BID and since then she has noticed an increase in lethargy/weakness. Today a friend went to check on her after they hadn't heard from her in a few weeks. The found her down in her home surrounded by beer cans with fecal incontenance. EMS was called and she was brought to ED for evaluation. Patient notes the last time she took her new medication was 4 days ago.    VS on arrival: HR 87, tep 98.2, RR 18, /52, SpO2 96%. Labs of significance include: lipase 74, , Tylenol < 5, BNP 3736, lactate 9.7, Urine osmo 249, Serum osmo 251, covid negative, UA + ketones, bili, blood, protein, leuk esterase, nitrites, WBC and bacteria, UDS/ETOH/Salicylate/Tylenol all negative, glucose 124, BUN 18, creat 4.48, Na 107, K 3, Chloride 64, Co2 16, total protein 5.5, AST 2211, , Alk phos 139, bili 6.7, GFR < 10, anion gap 27, troponin 0.023, Mag 1.6, WBC 12.97, HgB 8.6, Plts 183.    CT Head Noncontributory. CXR with enlarged  cardiac silhouette as well as increased central vascular congestion, no overt edema or pleural effusions.     In ED she was given a dose of Ceftriaxone. With her significant lab abnormalities as well as potential for decompensation, she will be admitted to ICU for management.     On my assessment patient is alert and oriented. Scleral icterus, no abdominal distention, no significant ascites. VSS.    Past Medical History:   Diagnosis Date   • Alcohol abuse    • Anxiety    • Arthritis    • Blistering of skin 3/1/2020   • Degenerative disc disease, lumbar    • Depression    • Hypertension      Past Surgical History:   Procedure Laterality Date   • APPENDECTOMY     • BARIATRIC SURGERY      2005, gastric sleeve   • CARPAL TUNNEL RELEASE Bilateral     07/2020   • CHOLECYSTECTOMY     • HERNIA REPAIR      2014   • SPINE SURGERY      cyst removal     Family History   Problem Relation Age of Onset   • Arthritis Mother    • Cancer Mother    • Obesity Mother    • Mental illness Mother    • Hypertension Mother    • Arthritis Sister    • Obesity Sister    • Mental illness Sister    • Hypertension Sister    • Obesity Brother    • Mental illness Brother    • Hypertension Brother    • Cancer Maternal Grandmother    • Breast cancer Neg Hx    • Ovarian cancer Neg Hx      Social History     Socioeconomic History   • Marital status:      Spouse name: Not on file   • Number of children: Not on file   • Years of education: Not on file   • Highest education level: Not on file   Tobacco Use   • Smoking status: Never Smoker   • Smokeless tobacco: Never Used   Substance and Sexual Activity   • Alcohol use: Yes     Alcohol/week: 6.0 standard drinks     Types: 6 Cans of beer per week     Comment: quit 02/18/2020, prior heavy alcohol use for 15y   • Drug use: Not Currently   • Sexual activity: Not Currently     Partners: Male     Birth control/protection: Post-menopausal     No Known Allergies  No current facility-administered medications  on file prior to encounter.     Current Outpatient Medications on File Prior to Encounter   Medication Sig Dispense Refill   • BIOTIN 5000 PO Take 5,000 mcg by mouth Daily.     • Brexpiprazole (Rexulti) 0.5 MG tablet Take 0.5 mg by mouth Daily.     • busPIRone (BUSPAR) 10 MG tablet Take 10 mg by mouth 2 (two) times a day.     • cetirizine (zyrTEC) 10 MG tablet Take 10 mg by mouth Daily.     • Cholecalciferol (VITAMIN D3) 125 MCG (5000 UT) capsule capsule Take 5,000 Units by mouth Daily.     • cloNIDine (CATAPRES) 0.1 MG tablet Take 0.1 mg by mouth Daily.     • doxepin (SINEquan) 10 MG capsule Take 10 mg by mouth Every Night.     • FLUoxetine (PROzac) 20 MG capsule Take 20 mg by mouth Daily.     • folic acid (FOLVITE) 1 MG tablet Take 1 tablet by mouth Daily. 90 tablet 1   • losartan (COZAAR) 50 MG tablet Take 1 tablet by mouth Daily. 30 tablet 0   • vitamin B-12 (CYANOCOBALAMIN) 1000 MCG tablet Take 3,000 mcg by mouth Daily.     • acyclovir (ZOVIRAX) 200 MG capsule Take  by mouth Every 4 (Four) Hours While Awake. 400mg the first day and 200mg for 4 days.     • acyclovir (ZOVIRAX) 5 % cream Apply  topically to the appropriate area as directed Every 3 (Three) Hours.     • B Complex-Biotin-FA (B-100 B-COMPLEX PO) Take  by mouth Daily.     • clindamycin-benzoyl peroxide (BENZACLIN) 1-5 % gel Apply  topically to the appropriate area as directed Daily. 50 g 11   • Multiple Vitamins-Minerals (HAIR SKIN AND NAILS FORMULA PO) Take 2,500 mcg by mouth Daily.     • Multiple Vitamins-Minerals (MULTIVITAMIN GUMMIES WOMENS) chewable tablet Chew 50 mg Daily.     • [DISCONTINUED] magnesium, as, gluconate (MAGONATE) 500 (27 Mg) MG tablet Take 125 mg by mouth 2 (Two) Times a Day.     • [DISCONTINUED] naltrexone (DEPADE) 50 MG tablet          ROS:  Per HPI, all other systems were reviewed and were negative        OBJECTIVE     Vital Sign Min/Max for last 24 hours  Temp  Min: 98.2 °F (36.8 °C)  Max: 98.2 °F (36.8 °C)   BP  Min: 92/52   Max: 116/41   Pulse  Min: 87  Max: 99   Resp  Min: 18  Max: 18   SpO2  Min: 87 %  Max: 100 %   No data recorded     Telemetry:              General Appearance:  Conversant, in no acute distress  Eyes:  Scleral icterus  Ears, Nose, Mouth, Throat:  Atraumatic, oropharynx clear  Neck:  Trachea midline, thyroid normal  Respiratory:  Clear to auscultation bilaterally, normal effort, no tenderness to palpation  Cardiovascular:  Regular rate and rhythm, no murmurs, no peripheral edema, no thrill  Gastrointestinal:  Soft, nontender, nondistended, no hepatosplenomegaly  Skin:  Normal temperature, no rash  Psychiatric:  Alert and oriented, though confused  Neuro:  No new focal neurologic deficits observed, no tremor or tongue fasiculations    SpO2: 100 % SpO2  Min: 87 %  Max: 100 %   Device:      Flow Rate:   No data recorded     Mechanical Ventilator Settings:                                         Intake/Ouptut 24 hrs (7:00AM - 6:59 AM)  Intake & Output (last 3 days)       05/30 0701 - 05/31 0700 05/31 0701 - 06/01 0700 06/01 0701 - 06/02 0700    I.V. (mL/kg)   200 (2.2)    Total Intake(mL/kg)   200 (2.2)    Net   +200                   Lines, Drains & Airways    Active LDAs     Name:   Placement date:   Placement time:   Site:   Days:    Peripheral IV 06/01/21 1656 Left Antecubital   06/01/21 1656    Antecubital   less than 1    Peripheral IV 06/01/21 1958 Right Antecubital   06/01/21 1958    Antecubital   less than 1                Hematology:  Results from last 7 days   Lab Units 06/01/21  1736   WBC 10*3/mm3 12.97*   HEMOGLOBIN g/dL 8.6*   HEMATOCRIT % 25.0*   PLATELETS 10*3/mm3 183   MONOCYTES % % 6.0     Electrolytes, Magnesium and Phosphorus:  Results from last 7 days   Lab Units 06/01/21  1736   SODIUM mmol/L 107*   POTASSIUM mmol/L 3.0*   CO2 mmol/L 16.0*   MAGNESIUM mg/dL 1.6   PHOSPHORUS mg/dL 4.3     Renal:  Results from last 7 days   Lab Units 06/01/21  1736   CREATININE mg/dL 4.48*   BUN mg/dL 18      Estimated Creatinine Clearance: 15.8 mL/min (A) (by C-G formula based on SCr of 4.48 mg/dL (H)).  Estimated Creatinine Clearance: 15.8 mL/min (A) (by C-G formula based on SCr of 4.48 mg/dL (H)).  Hepatic:  Results from last 7 days   Lab Units 06/01/21  1736   ALK PHOS U/L 139*   BILIRUBIN mg/dL 6.7*   ALT (SGPT) U/L 453*   AST (SGOT) U/L 2,211*     Arterial Blood Gases:              Lab Results   Component Value Date    LACTATE 9.7 (C) 06/01/2021       CT Head Without Contrast    Result Date: 6/1/2021  Narrative: EXAMINATION: CT HEAD WO CONTRAST-  INDICATION: Mental status change, unknown cause  TECHNIQUE: CT head without intravenous contrast  The radiation dose reduction device was turned on for each scan per the ALARA (As Low as Reasonably Achievable) protocol.  COMPARISON: NONE  FINDINGS: Midline structures are symmetric without evidence of mass, mass effect or midline shift. Ventricles and sulci within normal limits. No intra-axial major extra-axial fluid collection. Globes and orbits unremarkable. Paranasal sinuses and mastoid cells are grossly clear well-pneumatized. Calvarium intact.        Impression: No acute intracranial findings.       XR Chest 1 View    Result Date: 6/1/2021  Narrative:  EXAMINATION: XR CHEST 1 VW-  INDICATION: Weak/Dizzy/AMS triage protocol  COMPARISON: NONE  FINDINGS: Cardiac size enlarged with increased perihilar lung markings of central vascular congestion without overt edema or pleural effusion        Impression: Cardiac size enlarged with increased central vascular congestion however no overt edema or pleural effusion of decompensation evident         Relevant imaging studies and labs from 06/01/21 were reviewed and interpreted by me    Medications (drips):  Pharmacy Consult - Pharmacy to dose  Pharmacy to dose vancomycin  sodium chloride        cefTRIAXone, 1 g, Intravenous, Once  pantoprazole, 40 mg, Intravenous, Q12H  [START ON 6/2/2021] piperacillin-tazobactam, 2.25 g,  "Intravenous, Q8H  piperacillin-tazobactam, 3.375 g, Intravenous, Once  sodium chloride, 10 mL, Intravenous, Q12H  vancomycin, 20 mg/kg, Intravenous, Once        Assessment/Plan   IMPRESSION / PLAN     Inpatient Problem List:  55 y.o.female:  Active Hospital Problems    Diagnosis    • Elevated lactic acid level    • Hyponatremia    • Hypokalemia    • Hypochloremia    • ABDI (acute kidney injury) (CMS/HCC)    • Elevated lipase    • Hypoproteinemia (CMS/HCC)    • Elevated bilirubin    • Leukocytosis    • Anemia    • UTI (urinary tract infection)    • Moderate episode of recurrent major depressive disorder (CMS/HCC)    • Alcoholism (CMS/HCC)         Impression:  55 y.o.female with relevant PMH of Alcoholism, depression, HTN, Obesity admitted 6/1/2021 after being \"found down\" surrounded by beer cans and with incontinence.  Found to have numerous abnormalities on labs:  Severe hyponatremia, ABDI, severe elevation in LFTs, lactic acidosis, +UA.    Overall presentation concerning for severe sepsis w/ likely urinary source, complicated by ABDI, Rhabdo, Shock liver +/- Alcoholic Hepatitis, AKA + Lactic Acidosis, Macrocytic anemia w/ hgb 8.6, and Severe Hyponatremia.  I suspect the hyponatremia is from beer potomania.  Serum Osm / Urine Osm ration not c/w SIADH, TSH & Cortisol normal.  Could also be from more chronic advanced liver disease, clearly has fatty liver on CT.    Plan:  Severe Sepsis - BS abx, pan-culture, MRSA PCR.  Likely urinary source.  Though alk phos / bili elevated, has previously had cholecystectomy.  Amylase / Lipase normal. No evidence for pneumonia on CT Abd or CXR.      ABDI - mckeon, urine studies, IVF    Severe Hyponatremia - as above suspect beer potomania / liver disease.  Give isotonic crystalloid.  Goal sodium 113-115 in 24 hours.  If she overcorrects will need D5W bolus.  Check sodium q2h overnight.    Severe Metabolic Acidosis / Lactic Acidosis - likely some component of AKA and Tybe B lactic " "acidosis.  Add dextrose to IVF.  High dose IV thiamine.    Acute Liver Injury - suspect shock liver on background of BRITO.  Monitor LFTs closely / INR closely.  If not improved in am would probably benefit from GI evaluation.  Hold off on steroids for tonight for alcoholic hepatitis component.  Check hepatitis panel.    Elevated BNP - Echo in am    Alcoholism - monitor for impending withdrawal.  She changed her timeline several times when asked when her last drink was and was found \"surrounded by beer cans\".  Order CIWA protocol prn.  Hold off on scheduled benzos for now.  Can also use precedex if needed for agitation.    Glycemic control    Hold home psych medicines, could possibly contribute to hyponatremia and/or liver injury    Patient is Critically Ill due to MOF as outlined above with high risk of decompensation and death.    GI/PUD prophylaxis    NPO for now except meds    Critical Care time spent in direct patient care: 60 minutes (excluding procedure time, if applicable) including high complexity decision making to assess, manipulate, and support vital organ system failure in this individual who has impairment of one or more vital organ systems such that there is a high probability of imminent or life threatening deterioration in the patient’s condition.       Wilmer Garsia MD  Intensive Care Medicine  06/01/21 21:32 EDT         Electronically signed by Wilmer Garsia MD at 06/01/21 2249          Emergency Department Notes      Yoli Barrera APRN at 06/01/21 1726      Procedure Orders    1. Critical Care [[id number]] ordered by Yoli Barrera APRN               Subjective   Patient is a 55-year-old white female who presents to the ER today via EMS after friends found her to be unresponsive in the floor of her apartment.  Friends went to check on her when they had not heard from her in several days.  Patient has history of alcoholism and was found with bottles and cans of alcohol surrounding " her, and having been incontinent of stool.  When EMS arrived she was found to have a blood sugar in the 30s, and hypotensive with a systolic in the 80s.  Patient was given D50 IV with improvement in mentation.  Upon arrival in the ER patient is awake and oriented.      History provided by:  EMS personnel and patient  Altered Mental Status  Presenting symptoms: unresponsiveness    Severity:  Severe  Most recent episode:  Today  Episode history:  Unable to specify  Timing:  Constant  Progression:  Resolved  Chronicity:  New  Context: alcohol use    Associated symptoms: bladder incontinence, nausea and weakness    Associated symptoms: no fever    Associated symptoms comment:  Stool incontinence      Review of Systems   Constitutional: Positive for fatigue. Negative for chills and fever.   Respiratory: Negative for shortness of breath.    Cardiovascular: Negative for chest pain.   Gastrointestinal: Positive for diarrhea and nausea.   Genitourinary: Positive for bladder incontinence.   Neurological: Positive for weakness.   All other systems reviewed and are negative.      Past Medical History:   Diagnosis Date   • Alcohol abuse    • Anxiety    • Arthritis    • Blistering of skin 3/1/2020   • Degenerative disc disease, lumbar    • Depression    • Hypertension        No Known Allergies    Past Surgical History:   Procedure Laterality Date   • APPENDECTOMY     • BARIATRIC SURGERY      2005, gastric sleeve   • CARPAL TUNNEL RELEASE Bilateral     07/2020   • CHOLECYSTECTOMY     • HERNIA REPAIR      2014   • SPINE SURGERY      cyst removal       Family History   Problem Relation Age of Onset   • Arthritis Mother    • Cancer Mother    • Obesity Mother    • Mental illness Mother    • Hypertension Mother    • Arthritis Sister    • Obesity Sister    • Mental illness Sister    • Hypertension Sister    • Obesity Brother    • Mental illness Brother    • Hypertension Brother    • Cancer Maternal Grandmother    • Breast cancer Neg Hx     • Ovarian cancer Neg Hx        Social History     Socioeconomic History   • Marital status:      Spouse name: Not on file   • Number of children: Not on file   • Years of education: Not on file   • Highest education level: Not on file   Tobacco Use   • Smoking status: Never Smoker   • Smokeless tobacco: Never Used   Substance and Sexual Activity   • Alcohol use: Yes     Alcohol/week: 6.0 standard drinks     Types: 6 Cans of beer per week     Comment: quit 02/18/2020, prior heavy alcohol use for 15y   • Drug use: Not Currently   • Sexual activity: Not Currently     Partners: Male     Birth control/protection: Post-menopausal           Objective   Physical Exam  Vitals and nursing note reviewed. Exam conducted with a chaperone present.   Constitutional:       Appearance: Normal appearance.   HENT:      Head: Normocephalic.      Right Ear: External ear normal.      Left Ear: External ear normal.      Mouth/Throat:      Mouth: Mucous membranes are dry.   Eyes:      Conjunctiva/sclera: Conjunctivae normal.      Pupils: Pupils are equal, round, and reactive to light.   Cardiovascular:      Rate and Rhythm: Normal rate and regular rhythm.   Pulmonary:      Effort: Pulmonary effort is normal.      Breath sounds: Normal breath sounds. No wheezing, rhonchi or rales.   Abdominal:      General: Bowel sounds are normal.      Palpations: Abdomen is soft.      Tenderness: There is no abdominal tenderness.   Genitourinary:     Pubic Area: Rash present.      Comments: Severe excoriation of the labia and perineal areas.  Skin:     General: Skin is warm.      Coloration: Skin is pale.   Neurological:      Mental Status: She is alert and oriented to person, place, and time.   Psychiatric:         Mood and Affect: Mood normal.      Comments: Slow but appropriate response to questions         Critical Care  Performed by: Yoli Barrera APRN  Authorized by: Gianni Johnson MD     Critical care provider statement:     Critical  care time (minutes):  30    Critical care time was exclusive of:  Separately billable procedures and treating other patients    Critical care was necessary to treat or prevent imminent or life-threatening deterioration of the following conditions:  Circulatory failure, dehydration and renal failure    Critical care was time spent personally by me on the following activities:  Development of treatment plan with patient or surrogate, discussions with consultants, evaluation of patient's response to treatment, examination of patient, obtaining history from patient or surrogate, review of old charts, re-evaluation of patient's condition, pulse oximetry, ordering and review of radiographic studies, ordering and review of laboratory studies and ordering and performing treatments and interventions              ED Course      Recent Results (from the past 24 hour(s))   POC Glucose Once    Collection Time: 06/01/21  5:19 PM    Specimen: Blood   Result Value Ref Range    Glucose 176 (H) 70 - 130 mg/dL   Comprehensive Metabolic Panel    Collection Time: 06/01/21  5:36 PM    Specimen: Blood   Result Value Ref Range    Glucose 124 (H) 65 - 99 mg/dL    BUN 18 6 - 20 mg/dL    Creatinine 4.48 (H) 0.57 - 1.00 mg/dL    Sodium 107 (C) 136 - 145 mmol/L    Potassium 3.0 (L) 3.5 - 5.2 mmol/L    Chloride 64 (L) 98 - 107 mmol/L    CO2 16.0 (L) 22.0 - 29.0 mmol/L    Calcium 8.7 8.6 - 10.5 mg/dL    Total Protein 5.5 (L) 6.0 - 8.5 g/dL    Albumin 3.00 (L) 3.50 - 5.20 g/dL    ALT (SGPT) 453 (H) 1 - 33 U/L    AST (SGOT) 2,211 (H) 1 - 32 U/L    Alkaline Phosphatase 139 (H) 39 - 117 U/L    Total Bilirubin 6.7 (H) 0.0 - 1.2 mg/dL    eGFR Non African Amer 10 (L) >60 mL/min/1.73    eGFR  African Amer      Globulin 2.5 gm/dL    A/G Ratio 1.2 g/dL    BUN/Creatinine Ratio 4.0 (L) 7.0 - 25.0    Anion Gap 27.0 (H) 5.0 - 15.0 mmol/L   Troponin    Collection Time: 06/01/21  5:36 PM    Specimen: Blood   Result Value Ref Range    Troponin T 0.023 0.000 -  0.030 ng/mL   Magnesium    Collection Time: 06/01/21  5:36 PM    Specimen: Blood   Result Value Ref Range    Magnesium 1.6 1.6 - 2.6 mg/dL   Green Top (Gel)    Collection Time: 06/01/21  5:36 PM   Result Value Ref Range    Extra Tube Hold for add-ons.    Lavender Top    Collection Time: 06/01/21  5:36 PM   Result Value Ref Range    Extra Tube hold for add-on    Gold Top - SST    Collection Time: 06/01/21  5:36 PM   Result Value Ref Range    Extra Tube Hold for add-ons.    CBC Auto Differential    Collection Time: 06/01/21  5:36 PM    Specimen: Blood   Result Value Ref Range    WBC 12.97 (H) 3.40 - 10.80 10*3/mm3    RBC 2.45 (L) 3.77 - 5.28 10*6/mm3    Hemoglobin 8.6 (L) 12.0 - 15.9 g/dL    Hematocrit 25.0 (L) 34.0 - 46.6 %    .0 (H) 79.0 - 97.0 fL    MCH 35.1 (H) 26.6 - 33.0 pg    MCHC 34.4 31.5 - 35.7 g/dL    RDW 15.1 12.3 - 15.4 %    RDW-SD 55.8 (H) 37.0 - 54.0 fl    MPV 11.0 6.0 - 12.0 fL    Platelets 183 140 - 450 10*3/mm3   Ethanol    Collection Time: 06/01/21  5:36 PM    Specimen: Blood   Result Value Ref Range    Ethanol <10 0 - 10 mg/dL   Manual Differential    Collection Time: 06/01/21  5:36 PM    Specimen: Blood   Result Value Ref Range    Neutrophil % 65.0 42.7 - 76.0 %    Lymphocyte % 8.0 (L) 19.6 - 45.3 %    Monocyte % 6.0 5.0 - 12.0 %    Eosinophil % 0.0 (L) 0.3 - 6.2 %    Basophil % 0.0 0.0 - 1.5 %    Bands %  21.0 (H) 0.0 - 5.0 %    Neutrophils Absolute 11.15 (H) 1.70 - 7.00 10*3/mm3    Lymphocytes Absolute 1.04 0.70 - 3.10 10*3/mm3    Monocytes Absolute 0.78 0.10 - 0.90 10*3/mm3    Eosinophils Absolute 0.00 0.00 - 0.40 10*3/mm3    Basophils Absolute 0.00 0.00 - 0.20 10*3/mm3    RBC Morphology Normal Normal    Toxic Granulation Mod/2+ None Seen    Vacuolated Neutrophils Large/3+ None Seen    Platelet Morphology Normal Normal    Clumped Platelets     TSH Rfx On Abnormal To Free T4    Collection Time: 06/01/21  5:36 PM    Specimen: Blood   Result Value Ref Range    TSH 3.750 0.270 - 4.200 uIU/mL    Osmolality, Serum    Collection Time: 06/01/21  5:36 PM    Specimen: Blood   Result Value Ref Range    Osmolality 251 (L) 275 - 295 mOsm/kg   Lactic Acid, Plasma    Collection Time: 06/01/21  5:36 PM    Specimen: Blood   Result Value Ref Range    Lactate 9.7 (C) 0.5 - 2.0 mmol/L   TSH    Collection Time: 06/01/21  5:36 PM    Specimen: Blood   Result Value Ref Range    TSH 3.750 0.270 - 4.200 uIU/mL   Urine Drug Screen - Urine, Catheter    Collection Time: 06/01/21  7:33 PM    Specimen: Urine, Catheter   Result Value Ref Range    THC, Screen, Urine Negative Negative    Phencyclidine (PCP), Urine Negative Negative    Cocaine Screen, Urine Negative Negative    Methamphetamine, Ur Negative Negative    Opiate Screen Negative Negative    Amphetamine Screen, Urine Negative Negative    Benzodiazepine Screen, Urine Negative Negative    Tricyclic Antidepressants Screen Negative Negative    Methadone Screen, Urine Negative Negative    Barbiturates Screen, Urine Negative Negative    Oxycodone Screen, Urine Negative Negative    Propoxyphene Screen Negative Negative    Buprenorphine, Screen, Urine Negative Negative   Urinalysis With Culture If Indicated - Urine, Catheter In/Out    Collection Time: 06/01/21  7:33 PM    Specimen: Urine, Catheter In/Out   Result Value Ref Range    Color, UA Orange (A) Yellow, Straw    Appearance, UA Turbid (A) Clear    pH, UA 5.5 5.0 - 8.0    Specific Gravity, UA 1.013 1.001 - 1.030    Glucose, UA Negative Negative    Ketones, UA Trace (A) Negative    Bilirubin, UA Small (1+) (A) Negative    Blood, UA Small (1+) (A) Negative    Protein, UA 30 mg/dL (1+) (A) Negative    Leuk Esterase, UA Large (3+) (A) Negative    Nitrite, UA Positive (A) Negative    Urobilinogen, UA 1.0 E.U./dL 0.2 - 1.0 E.U./dL   Urinalysis, Microscopic Only - Urine, Catheter In/Out    Collection Time: 06/01/21  7:33 PM    Specimen: Urine, Catheter In/Out   Result Value Ref Range    RBC, UA 0-2 None Seen, 0-2 /HPF    WBC, UA Too  Numerous to Count (A) None Seen, 0-2 /HPF    Bacteria, UA 4+ (A) None Seen, Trace /HPF    Squamous Epithelial Cells, UA 3-6 (A) None Seen, 0-2 /HPF    Hyaline Casts, UA 0-6 0 - 6 /LPF    Methodology Manual Light Microscopy    Osmolality, Urine - Urine, Catheter In/Out    Collection Time: 06/01/21  7:33 PM    Specimen: Urine, Catheter In/Out   Result Value Ref Range    Osmolality, Urine 249 (L) 300-1,100 mOsm/kg   COVID-19, [facility] IN-HOUSE,NASAL Swab (NO TRANSPORT MEDIA) 2 HR TAT - Swab, Nasopharynx    Collection Time: 06/01/21  7:42 PM    Specimen: Nasopharynx; Swab   Result Value Ref Range    COVID19 Presumptive Negative Presumptive Negative - Ref. Range   POC Occult Blood Stool    Collection Time: 06/01/21  7:48 PM    Specimen: Per Rectum; Stool   Result Value Ref Range    Fecal Occult Blood Negative Negative    Lot Number [id number]     Expiration Date 10/23     DEVELOPER LOT NUMBER [id number]     DEVELOPER EXPIRATION DATE 2,022-10     Positive Control Positive Positive    Negative Control Negative Negative     Note: In addition to lab results from this visit, the labs listed above may include labs taken at another facility or during a different encounter within the last 24 hours. Please correlate lab times with ED admission and discharge times for further clarification of the services performed during this visit.    XR Chest 1 View   Preliminary Result   Cardiac size enlarged with increased central vascular   congestion however no overt edema or pleural effusion of decompensation   evident              CT Head Without Contrast   Preliminary Result   No acute intracranial findings.              CT Abdomen Pelvis Without Contrast    (Results Pending)     Vitals:    06/01/21 2000 06/01/21 2020 06/01/21 2021 06/01/21 2022   BP: 116/41 92/52 92/52 102/64   BP Location:       Patient Position:   Lying Sitting   Pulse: 96 99 99 98   Resp:       Temp:       TempSrc:       SpO2: 100% 100% 100%    Weight:       Height:            ECG/EMG Results (last 24 hours)     Procedure Component Value Units Date/Time    ECG 12 Lead [[id number]] Collected: 06/01/21 1727     Updated: 06/01/21 1732        ECG 12 Lead             Spoke with Dr. Wilmer Carson with critical care and will admit to ICU.                                     MDM    Final diagnoses:   Acute renal failure, unspecified acute renal failure type (CMS/HCC)   Hyponatremia   Hypokalemia   Anemia, unspecified type   Alcoholism (CMS/HCC)       ED Disposition  ED Disposition     ED Disposition Condition Comment    Decision to Admit  Level of Care: Critical Care [6]   Admitting Physician: WILMER CARSON [[id number]]   Patient Class: Inpatient [101]            No follow-up provider specified.       Medication List      No changes were made to your prescriptions during this visit.          Yoli Barrera APRN  06/01/21 2111       Yoli Barrera APRN  06/01/21 2113      Electronically signed by Yoli Barrera APRN at 06/01/21 2113         Current Facility-Administered Medications   Medication Dose Route Frequency Provider Last Rate Last Admin   • dextrose (D50W) 25 g/ 50mL Intravenous Solution 25 g  25 g Intravenous Q15 Min PRN Wilmer Carson MD   25 g at 06/02/21 1220   • dextrose (GLUTOSE) oral gel 15 g  15 g Oral Q15 Min PRN Wilmer Carson MD       • dextrose 5 % and lactated Ringer's infusion  75 mL/hr Intravenous Continuous Candido Capps MD 75 mL/hr at 06/02/21 1128 75 mL/hr at 06/02/21 1128   • famotidine (PEPCID) tablet 20 mg  20 mg Oral BID PRN Raquel Vega APRN   20 mg at 06/02/21 0415   • folic acid 1 mg in sodium chloride 0.9 % 50 mL IVPB  1 mg Intravenous Daily Wilmer Carson  mL/hr at 06/02/21 0808 1 mg at 06/02/21 0808   • glucagon (human recombinant) (GLUCAGEN DIAGNOSTIC) injection 1 mg  1 mg Subcutaneous Q15 Min PRN Wilmer Carson MD       • insulin regular (humuLIN R,novoLIN R) injection 0-7  Units  0-7 Units Subcutaneous Q6H Wilmer Garsia MD       • multivitamin with minerals 1 tablet  1 tablet Oral Daily Wilmer Garsia MD   1 tablet at 06/02/21 0808   • norepinephrine (LEVOPHED) 8 mg in 250 mL NS infusion (premix)  0.02-0.3 mcg/kg/min Intravenous Titrated Wilmer Garsia MD 52.3 mL/hr at 06/02/21 1355 0.3 mcg/kg/min at 06/02/21 1355   • pantoprazole (PROTONIX) injection 40 mg  40 mg Intravenous Q12H Raquel Vega APRN   40 mg at 06/02/21 0808   • piperacillin-tazobactam (ZOSYN) 3.375 g in iso-osmotic dextrose 50 ml (premix)  3.375 g Intravenous Q12H Sofya Calderon, PharmD       • sodium chloride 0.9 % flush 10 mL  10 mL Intravenous Q12H Raquel Vega APRN       • sodium chloride 0.9 % flush 10 mL  10 mL Intravenous PRN Raquel Vega APRN       • thiamine (B-1) 500 mg in sodium chloride 0.9 % 100 mL IVPB  500 mg Intravenous Q8H Wilmer Garsia  mL/hr at 06/02/21 1357 500 mg at 06/02/21 1357        Operative/Procedure Notes (last 72 hours) (Notes from 05/30/21 1359 through 06/02/21 1359)      Raquel Vega APRN at 06/02/21 0528      Procedure Orders    1. Insert Central Line At Bedside [[id number]] ordered by Raquel Vega APRN           Post-procedure Diagnoses    1. Acute renal failure, unspecified acute renal failure type (CMS/HCC) [N17.9]             Insert Central Line At Bedside    Date/Time: 6/2/2021 5:28 AM  Performed by: Raquel Vega APRN  Authorized by: Raquel Vega APRN   Consent: Verbal consent obtained.  Risks and benefits: risks, benefits and alternatives were discussed  Consent given by: patient  Patient understanding: patient states understanding of the procedure being performed  Patient consent: the patient's understanding of the procedure matches consent given  Procedure consent: procedure consent matches procedure scheduled  Relevant documents: relevant documents present and verified  Test results: test results available  "and properly labeled  Site marked: the operative site was marked  Imaging studies: imaging studies available  Required items: required blood products, implants, devices, and special equipment available  Patient identity confirmed: arm band and hospital-assigned identification number  Time out: Immediately prior to procedure a \"time out\" was called to verify the correct patient, procedure, equipment, support staff and site/side marked as required.  Indications: vascular access    Anesthesia:  Local Anesthetic: lidocaine 1% without epinephrine    Sedation:  Patient sedated: yes  Vitals: Vital signs were monitored during sedation.    Preparation: skin prepped with ChloraPrep  Skin prep agent dried: skin prep agent completely dried prior to procedure  Sterile barriers: all five maximum sterile barriers used - cap, mask, sterile gown, sterile gloves, and large sterile sheet  Hand hygiene: hand hygiene performed prior to central venous catheter insertion  Location details: left internal jugular  Patient position: flat  Catheter type: triple lumen  Catheter size: 7 Fr  Pre-procedure: landmarks identified  Ultrasound guidance: yes  Sterile ultrasound techniques: sterile gel and sterile probe covers were used  Number of attempts: 1  Successful placement: yes  Post-procedure: line sutured and dressing applied  Assessment: blood return through all ports,  free fluid flow,  placement verified by x-ray and no pneumothorax on x-ray  Patient tolerance: Patient tolerated the procedure well with no immediate complications          Electronically signed by Raquel Vega APRN at 06/02/21 0555          Physician Progress Notes (last 72 hours) (Notes from 05/30/21 1359 through 06/02/21 1359)      Candido Capps MD at 06/02/21 1005          Intensivist Note     6/2/2021  Hospital Day: 1  * No surgery found *  ICU Stays Timeline            Hospital Admission: 06/01/21 1712 - Current  ICU stays: 1      In Date/Time Event Department ICU " "Stay Duration     06/01/21 1712 Admission [facility] EMERGENCY DEPT      06/01/21 2317 Transfer In [facility] 2HSIC 10 hours 48 minutes             Ms. Laura Jang, 55 y.o. female is followed for:    Alcoholism (CMS/HCC)    Moderate episode of recurrent major depressive disorder (CMS/HCC)    Elevated lactic acid level    Hyponatremia    Hypokalemia    Hypochloremia    ABDI (acute kidney injury) (CMS/HCC)    Elevated lipase    Hypoproteinemia (CMS/HCC)    Elevated bilirubin    Leukocytosis    Anemia    UTI (urinary tract infection)    Acute renal failure (CMS/HCC)       SUBJECTIVE     55-year-old white female non-smoker with a questionable history of alcoholism, hypertension, obesity and both sleeve gastrectomy lap band as well as cholecystectomy, anxiety/depression, and DDD.  Apparently lost her job 4 weeks ago and has had increase alcohol intake.  With the help of her therapist, 2 weeks ago began to wean off alcohol and began to note increasing weakness and lethargy.  Was also apparently begun on unknown \"new medication\".  On 6/1/2021 a friend who had not talked to her in several days went by patient's house and found her on the floor surrounded by beer cans with fecal incontinence.  Upon EMS arrival glucose was in the 30s and patient was hypotensive with a SBP in the 80s.  D50 led to improvement in mentation and she was transferred to [facility] ER.  In the ER hematocrit 25, WBC 12.97 with a left shift, procalcitonin 3.31, platelets 183, sodium 107, potassium 3.0, chloride 64, bicarb 16, BUN 18, creatinine 4.48, alk phos 139, , AST 2,211, bilirubin 6.7, albumin 3.0, cortisol 65, and amylase 56. ProBNP 3,736.  ABGs revealed pH 7.42, PCO2 24, PO2 80 on room air.  Lactic acid 9.2.  Urinalysis revealed too TNTC  WBCs and 4+ bacteria.  Patient was cultured up and received a dose of Zosyn but never received vancomycin.  Was also begun on fluids and pressors.    Interval history: Patient profoundly weak and debilitated but is able " "to answer questions appropriately and indicates that she feels better (by this she indicates she she feels stronger).  Hemodynamically she remains hypotensive and mildly tachycardic with a blood pressure 89/43 and heart rate of 108 on a significant dose of norepinephrine.  CVP today however is only 5 and hematocrit has dropped to 22 although there has been no obvious GI losses.  She denies nausea, vomiting, abdominal pain, diarrhea, melena, or hematochezia.  Labs today reveal drop in hematocrit to 22, WBC remains normal but has a left shift, platelets are normal.  INR is 3.68, procalcitonin is improved but is still 2.89, and lactate has decreased slightly to 8.7.  Bilirubin remains elevated at 6.3 but AST is decreased to 1789.  Patient remains acidotic with a serum bicarb of 14 and remains in renal failure with a creatinine of 4.73 (although BUN only 21).  Urine culture has already returned growing greater than 100,000 gram-negative rods.  Blood culture is pending      ROS: Unable to obtain due to acuity of illness    The patient's relevant PMH, PSH, FH, and SH were reviewed and updated in Epic as appropriate. Allergies and Medications reviewed.    OBJECTIVE     /46   Pulse 98   Temp 98.8 °F (37.1 °C) (Bladder)   Resp 18   Ht 165.1 cm (65\")   Wt 93 kg (205 lb)   SpO2 100%   BMI 34.11 kg/m²      Presently on room air    Flowsheet Rows      First Filed Value   Admission Height  165.1 cm (65\") Documented at 06/01/2021 1730   Admission Weight  93 kg (205 lb) Documented at 06/01/2021 1730        Intake & Output (last day)       06/01 0701 - 06/02 0700 06/02 0701 - 06/03 0700    I.V. (mL/kg) 808.4 (8.7)     IV Piggyback 200     Total Intake(mL/kg) 1008.4 (10.8)     Urine (mL/kg/hr) 550     Total Output 550     Net +458.4                 Exam:  General Exam:  Acute on chronically ill white female appearing far older than stated age  HEENT: Pupils equal and reactive.  Mild conjunctival jaundice.  Nose and " throat clear.  Neck:                          Supple, no JVD, thyromegaly, or adenopathy  Lungs: Clear to auscultation and percussion anteriorly and posteriorly.  No wheezes or rales  Cardiovascular: RRR without murmurs or gallops.   bpm  Abdomen: Protuberant/obese but no obvious hepatosplenomegaly   and rectal: Nunes catheter in place  Extremities: No cyanosis clubbing edema.  Small ecchymoses of forearms  Neurologic:                 Symmetric strength but profoundly and diffusely weak. No focal deficits.    Chest X-Ray: Cardiomegaly.  Mild atelectasis/infiltrate in the bases left greater than right.  Left IJ line in place    INFUSIONS  dextrose 5 % and lactated Ringer's, 50 mL/hr, Last Rate: 50 mL/hr (06/02/21 0017)  norepinephrine, 0.02-0.3 mcg/kg/min, Last Rate: 0.3 mcg/kg/min (06/02/21 1035)        Results from last 7 days   Lab Units 06/02/21  0553 06/01/21  1736   WBC 10*3/mm3 9.13 12.97*   HEMOGLOBIN g/dL 7.5* 8.6*   HEMATOCRIT % 22.0* 25.0*   PLATELETS 10*3/mm3 194 183     Results from last 7 days   Lab Units 06/02/21  0915 06/02/21  0553   SODIUM mmol/L 114* 111*   POTASSIUM mmol/L 3.6 3.1*   CHLORIDE mmol/L 74* 71*   CO2 mmol/L 14.0* 15.0*   BUN mg/dL 21* 20   CREATININE mg/dL 4.73* 4.57*   GLUCOSE mg/dL 59* 80   CALCIUM mg/dL 8.3* 8.0*     Results from last 7 days   Lab Units 06/02/21  0553 06/02/21  0343 06/01/21  2354 06/01/21  1736   MAGNESIUM mg/dL 4.4* 2.5 1.5* 1.6   PHOSPHORUS mg/dL 3.1  --   --  4.3     Results from last 7 days   Lab Units 06/02/21  0553 06/01/21  1736   ALK PHOS U/L 116 139*   BILIRUBIN mg/dL 6.3* 6.7*   ALT (SGPT) U/L 385* 453*   AST (SGOT) U/L 1,789* 2,211*       No results found for: SEDRATE  No results found for: BNP  Lab Results   Component Value Date    CKTOTAL 668 (H) 06/01/2021    TROPONINT 0.023 06/01/2021     Lab Results   Component Value Date    TSH 3.750 06/01/2021    TSH 3.750 06/01/2021     Lab Results   Component Value Date    LACTATE 8.7 (C) 06/02/2021      Lab Results   Component Value Date    CORTISOL 65.14 06/01/2021       Results from last 7 days   Lab Units 06/02/21  0634   PH, ARTERIAL pH units 7.421   PCO2, ARTERIAL mm Hg 23.9*   PO2 ART mm Hg 80.5*   HCO3 ART mmol/L 15.5*   FIO2 % 21         I reviewed the patient's results, images and medication.    Assessment/Plan   ASSESSMENT        Alcoholism (CMS/[facility])    Moderate episode of recurrent major depressive disorder (CMS/[facility])    Elevated lactic acid level    Hyponatremia    Hypokalemia    Hypochloremia    ABDI (acute kidney injury) (CMS/[facility])    Elevated lipase    Hypoproteinemia (CMS/[facility])    Elevated bilirubin    Leukocytosis    Anemia    UTI (urinary tract infection)    Acute renal failure (CMS/[facility])      DISCUSSION: It appears the scenario is that she developed gram-negative UTI with urosepsis and hypotension all in the face of underlying fatty liver and alcoholism.  Apparently collapsed at home and due to persistent hypotension developed an acute kidney injury.  On top of this she is severely anemic probably due to some GI losses although we have not seen any as of yet    PLAN     1.  Needs volume in view of her septic shock and since she is anemic PRBC would be best  2.  Additional fluids on top of above as needed  3.  Will allow clear liquids since she is alert  4.  Continue Zosyn and adjust based upon final results of culture from her gram-negative flora  5.  Single dose of bicarb as we improve her pH her norepinephrine more effective  6.  Hopefully her renal function will rebound  7.  Shock liver already appears slightly improved with decrease in LFTs    Plan of care and goals reviewed with multidisciplinary team at daily rounds.    I discussed the patient's findings and my recommendations with patient, nursing staff and consulting provider    Patient is critically ill due to septic shock due to urosepsis with acute renal failure, shock liver, and persistent lactic acidosis in the face of the severe  anemia.  She has a high risk of life-threatening decline in condition and requires continuous monitoring and frequent reassessment of condition for adjustment of management in order to lessen risk.  I visited the patient's bedside and interacted with nurse numerous times today.    Time spent Critical care 40 min (It does not include procedure time).    Electronically signed by Candido Capps MD, 06/02/21, 10:05 AM EDT.   Pulmonary / Critical care medicine     Electronically signed by Candido Capps MD, 06/02/21, 10:05 AM EDT.    Electronically signed by Candido Capps MD at 06/02/21 1143          Consult Notes (last 72 hours) (Notes from 05/30/21 1359 through 06/02/21 1359)      Gianni Martinez MD at 06/02/21 0910      Consult Orders    1. Inpatient Gastroenterology Consult [[id number]] ordered by Wilmer Garsia MD at 06/01/21 2243               [facility]    Referring Provider: No ref. provider found    Primary Care Provider: Renetta Santizo MD    Reason for Consultation: Severe acute liver injury    Chief complaint: weakness    History of present illness:  Laura Jang is a 55 y.o. female who is admitted with severe sepsis.  She was found to have severe hyponatremia, severe metabolic acidosis as well as acute liver injury.  She states she drinks about 4 beers a day.  Has been cutting back from about 8.  No history of liver disease.  No family for liver disease.  States she has been vaccinated for hepatitis a and B.  She has a history of a LAP-BAND has also had a history of sleeve gastrectomy.  Had cholecystectomy at that time.  She does complain of some right lower quadrant pain since admission.  States her last colonoscopy was in the last year.    She was found down surrounded by beer cans and was incontinent.  States she is feeling better today.  No nausea or vomiting.  No loss of appetite.  No fever chills.    Allergies:  Patient has no known allergies.    Scheduled  Meds:  folic acid (FOLVITE) IVPB, 1 mg, Intravenous, Daily  insulin regular, 0-7 Units, Subcutaneous, Q6H  multivitamin with minerals, 1 tablet, Oral, Daily  pantoprazole, 40 mg, Intravenous, Q12H  piperacillin-tazobactam, 3.375 g, Intravenous, Q12H  sodium chloride, 10 mL, Intravenous, Q12H  thiamine (VITAMIN B1) IVPB, 500 mg, Intravenous, Q8H  [START ON 6/3/2021] vancomycin (dosing per levels), , Does not apply, Daily  vancomycin, 1,500 mg, Intravenous, Once         Infusions:  dextrose 5 % and lactated Ringer's, 50 mL/hr, Last Rate: 50 mL/hr (06/02/21 0017)  norepinephrine, 0.02-0.3 mcg/kg/min, Last Rate: 0.24 mcg/kg/min (06/02/21 0907)  Pharmacy to dose vancomycin,         PRN Meds:  •  dextrose  •  dextrose  •  famotidine  •  glucagon (human recombinant)  •  Pharmacy to dose vancomycin  •  sodium chloride    Home Meds:  Medications Prior to Admission   Medication Sig Dispense Refill Last Dose   • BIOTIN 5000 PO Take 5,000 mcg by mouth Daily.   Past Week at Unknown time   • Brexpiprazole (Rexulti) 0.5 MG tablet Take 0.5 mg by mouth Daily.   Past Week at Unknown time   • busPIRone (BUSPAR) 10 MG tablet Take 10 mg by mouth 2 (two) times a day.   Past Week at Unknown time   • cetirizine (zyrTEC) 10 MG tablet Take 10 mg by mouth Daily.   Past Week at Unknown time   • Cholecalciferol (VITAMIN D3) 125 MCG (5000 UT) capsule capsule Take 5,000 Units by mouth Daily.   Past Week at Unknown time   • cloNIDine (CATAPRES) 0.1 MG tablet Take 0.1 mg by mouth Daily.   Past Week at Unknown time   • doxepin (SINEquan) 10 MG capsule Take 10 mg by mouth Every Night.   Past Week at Unknown time   • FLUoxetine (PROzac) 20 MG capsule Take 20 mg by mouth Daily.   Past Week at Unknown time   • folic acid (FOLVITE) 1 MG tablet Take 1 tablet by mouth Daily. 90 tablet 1 Past Week at Unknown time   • losartan (COZAAR) 50 MG tablet Take 1 tablet by mouth Daily. 30 tablet 0 Past Week at Unknown time   • vitamin B-12 (CYANOCOBALAMIN) 1000 MCG  tablet Take 3,000 mcg by mouth Daily.   Past Week at Unknown time   • acyclovir (ZOVIRAX) 200 MG capsule Take  by mouth Every 4 (Four) Hours While Awake. 400mg the first day and 200mg for 4 days.   More than a month at Unknown time   • acyclovir (ZOVIRAX) 5 % cream Apply  topically to the appropriate area as directed Every 3 (Three) Hours.   More than a month at Unknown time   • B Complex-Biotin-FA (B-100 B-COMPLEX PO) Take  by mouth Daily.      • clindamycin-benzoyl peroxide (BENZACLIN) 1-5 % gel Apply  topically to the appropriate area as directed Daily. 50 g 11 More than a month at Unknown time   • Multiple Vitamins-Minerals (HAIR SKIN AND NAILS FORMULA PO) Take 2,500 mcg by mouth Daily.   More than a month at Unknown time   • Multiple Vitamins-Minerals (MULTIVITAMIN GUMMIES WOMENS) chewable tablet Chew 50 mg Daily.   More than a month at Unknown time       ROS: Review of Systems   Constitutional: Negative for appetite change, chills, fever and unexpected weight change.   Respiratory: Negative for shortness of breath.    Cardiovascular: Negative for chest pain and leg swelling.   Gastrointestinal: Positive for abdominal pain and vomiting. Negative for diarrhea.   Skin: Negative for color change.   Neurological: Positive for weakness.   All other systems reviewed and are negative.      PAST MED HX: Pt  has a past medical history of Alcohol abuse, Anxiety, Arthritis, Blistering of skin (3/1/2020), Degenerative disc disease, lumbar, Depression, and Hypertension.  PAST SURG HX: Pt  has a past surgical history that includes Bariatric Surgery; Hernia repair; Spine surgery; Cholecystectomy; Appendectomy; and Carpal tunnel release (Bilateral).  FAM HX: family history includes Arthritis in her mother and sister; Cancer in her maternal grandmother and mother; Hypertension in her brother, mother, and sister; Mental illness in her brother, mother, and sister; Obesity in her brother, mother, and sister.  SOC HX: Pt  reports  "that she has never smoked. She has never used smokeless tobacco. She reports current alcohol use of about 6.0 standard drinks of alcohol per week. She reports previous drug use.    BP (!) 83/53   Pulse 98   Temp 98.8 °F (37.1 °C) (Bladder)   Resp 18   Ht 165.1 cm (65\")   Wt 93 kg (205 lb)   SpO2 97%   BMI 34.11 kg/m²     Physical Exam  Wt Readings from Last 3 Encounters:   06/01/21 93 kg (205 lb)   03/10/21 92.8 kg (204 lb 9.6 oz)   02/05/21 90.4 kg (199 lb 4.7 oz)   ,body mass index is 34.11 kg/m².    General Well developed; well nourished; no acute distress.   ENT Good dentition.  Oral mucosa pink & moist without thrush or lesions.    Neck Neck supple; trachea midline. No thyromegaly  Resp CTA; no rhonchi, rales, or wheezes.  Respiration effort normal  CV RRR; ; no M/R/G.  1+ lower extremity edema  GI Abd soft, minimally tender, obese, ND, normal active bowel sounds.Skin No rash; no lesions; no bruises.  Skin turgor normal  MSK No clubbing; no cyanosis.    Psych Oriented to time, place, and person.  Appropriate affect      Results Review:   I reviewed the patient's new clinical results.  I reviewed the patient's new imaging results and agree with the interpretation.    Lab Results   Component Value Date    WBC 9.13 06/02/2021    HGB 7.5 (L) 06/02/2021    HCT 22.0 (L) 06/02/2021    .3 (H) 06/02/2021     06/02/2021       Lab Results   Component Value Date    GLUCOSE 80 06/02/2021    BUN 20 06/02/2021    CREATININE 4.57 (H) 06/02/2021    EGFRIFNONA 10 (L) 06/02/2021    EGFRIFAFRI  06/02/2021      Comment:      <15 Indicative of kidney failure.    BCR 4.4 (L) 06/02/2021    CO2 15.0 (L) 06/02/2021    CALCIUM 8.0 (L) 06/02/2021    ALBUMIN 3.10 (L) 06/02/2021    AST 1,789 (H) 06/02/2021     (H) 06/02/2021   Results for RADHA PATIÑO (MRN [id number]) as of 6/2/2021 08:54   Ref. Range 3/25/2020 08:53 8/28/2020 09:44 6/1/2021 17:36 6/2/2021 05:53   AST (SGOT) Latest Ref Range: 1 - 32 U/L 33 (H) 35 " (H) 2,211 (H) 1,789 (H)     Results for RADHA PATIÑO (MRN [id number]) as of 6/2/2021 08:54   Ref. Range 6/1/2021 22:20 6/2/2021 05:53   INR Latest Ref Range: 0.85 - 1.16  2.82 (H) 3.68 (H)   Results for RADHA PATIÑO (MRN [id number]) as of 6/2/2021 08:54   Ref. Range 6/1/2021 23:54   Hep A IgM Latest Ref Range: Non-Reactive  Non-Reactive   Hepatitis B Surface Ag Latest Ref Range: Non-Reactive  Non-Reactive   Hep B Core IgM Latest Ref Range: Non-Reactive  Non-Reactive   Hepatitis C Ab Latest Ref Range: Non-Reactive  Non-Reactive   HIV-1/ HIV-2 Ab Latest Ref Range: Non-Reactive  Non-Reactive   Results for RADHA PATIÑO (MRN [id number]) as of 6/2/2021 08:54   Ref. Range 6/3/2020 10:52 6/4/2020 14:56 6/1/2021 17:36 6/1/2021 22:20 6/1/2021 23:54 6/2/2021 03:43 6/2/2021 05:53   Ammonia Latest Ref Range: 11 - 51 umol/L    58 (H)      Amylase Latest Ref Range: 28 - 100 U/L   56       Lactate Latest Ref Range: 0.5 - 2.0 mmol/L   9.7 (C) 9.1 (C)   8.7 (C)   Lipase Latest Ref Range: 13 - 60 U/L   74 (H)       Magnesium Latest Ref Range: 1.6 - 2.6 mg/dL   1.6  1.5 (L) 2.5 4.4 (H)   Osmolality Latest Ref Range: 275 - 295 mOsm/kg   251 (L)       Procalcitonin Latest Ref Range: 0.00 - 0.25 ng/mL   3.31 (H)    2.89 (H)   Vitamin B1, Whole Blood Latest Ref Range: 66.5 - 200.0 nmol/L 225.2 (H)         Vitamin B6 Latest Ref Range: 2.0 - 32.8 ug/L  106.6 (H)        Results for RADHA PATIÑO (MRN [id number]) as of 6/2/2021 08:54   Ref. Range 6/1/2021 17:36 6/2/2021 05:53   Creatine Kinase Latest Ref Range: 20 - 180 U/L 668 (H)    Troponin T Latest Ref Range: 0.000 - 0.030 ng/mL 0.023    proBNP Latest Ref Range: 0.0 - 900.0 pg/mL 3,736.0 (H) 3,990.0 (H)       CT abdomen pelvis per radiology report:       FINDINGS:  Included lung bases are clear. Heart size is the upper limits of normal. Moderate-sized hiatal hernia demonstrated.     Abdomen: The liver shows evidence of severe hepatic steatosis. The outline appears mildly nodular suggesting  "hepatic cirrhosis. The gallbladder is surgically absent. The spleen and pancreas appear unremarkable. No adrenal abnormalities. The kidneys show  no evidence of hydronephrosis. No renal or ureteral calculi.     Evidence of prior bariatric surgery.     Pelvis: There is evidence of increased attenuation within the mesenteric fat just below the aortic bifurcation. This can be seen in mesenteric panniculitis. This can also be seen in cirrhosis. Please correlate clinically.     The bowel pattern is nonobstructive. No free air. No free fluid. The urinary bladder outline is smooth.     Regional osseous structures show no acute or aggressive bone lesions. No threshold of abdominal or pelvic adenopathy.     IMPRESSION:     1.  Severe hepatic steatosis. The outline of the liver appears mildly nodular suggesting hepatic cirrhosis.     2.  There is increased attenuation in the mesenteric fat just below the aortic bifurcation. This \"so mesentery\" can be seen in mesenteric panniculitis. This can also be seen in hepatic cirrhosis. Please correlate clinically.           ASSESSMENTS/PLANS    1.  Transaminitis  2.  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  EtOH abuse  4.  Hyponatremia  5.  ABDI  6.  Metabolic acidosis  7.  Elevated creatinine kinase  8.  Elevated proBNP  9.  Status post sleeve gastrectomy, cholecystectomy  10.  Coagulopathy  7.  Malnutrition      Suspect patient has BRITO cirrhosis compounded by EtOH abuse.  Her marked increase of LFTs are related to shock liver +/- rhabdo.  Should see a rapid improvement in liver function test if this is the case.    Will test for immunity to hepatitis A and B and vaccinate if not immune.  Her vitamin D level is normal.    Will need to discuss nutrition at discharge with protein bedtime snack, high-protein diet, weight loss as well as avoiding raw shellfish.    Will watch for withdrawal but suspect most of her liver disease is related to her RBITO    Suspect her \" so mesentery \"is related to her " cirrhosis.    Will give vitamin K and recheck INR tomorrow.    Resume diet with protein bedtime snack    I discussed the patient's findings and my recommendations with patient and primary care team    Gianni Martinez MD  06/02/21  09:10 EDT    Electronically signed by Gianni Martinez MD at 06/02/21 [id number]

## 2021-06-03 NOTE — SIGNIFICANT NOTE
Pt's , Michael Jang called to relinquish rights to make medical decisions to patient's sister, Davin Tejada. Pt's  wants pt's sister to make all medical decisions.  Horace Whitman RN on [facility] was a witness and spoke with  about his decision to rescind rights to making medical decisions to her sister, Davin Tejada.

## 2021-06-03 NOTE — PLAN OF CARE
Goal Outcome Evaluation:    Patient alert but disoriented place and situation, frequently found talking to self, difficult to reorient. Patient on room air, norepi and LR D5. Norepi weaned down significantly after x2 units of blood and albumin given. Sodium slowly trending up over night. Lactic acid decreasing.  UO scant at approx 15mL/hr with increasing creatinine.

## 2021-06-03 NOTE — PLAN OF CARE
Goal Outcome Evaluation:      Pt remains confused throughout shift, will follow simple commands.  Keofeed placed for nutrition and meds.  Pt with dark tarry stool around 11 am and brick red colored stool this afternoon.  H&H ordered for 6pm and MN casey.  , Michael Jang,  called to give up his right to medically make decisions to pt's sister, Davin Tejada.

## 2021-06-03 NOTE — PROGRESS NOTES
Clinical Nutrition     Reason for Visit: MDR, Identified at risk by screening criteria    Patient Name: Laura Jang  YOB: 1965  MRN: [id number]  Date of Encounter: 06/03/21 11:47 EDT  Admission date: 6/1/2021    Nutrition Assessment   Admission Problem List:  Sepsis  UTI  Cirrhosis  ABDI  Anemia  Lactic Acidosis  Hyponatremia  AMS    PMH:   She  has a past medical history of Alcohol abuse, Anxiety, Arthritis, Blistering of skin (3/1/2020), Degenerative disc disease, lumbar, Depression, and Hypertension.  PSxH:  She  has a past surgical history that includes Bariatric Surgery; Hernia repair; Spine surgery; Cholecystectomy; Appendectomy; and Carpal tunnel release (Bilateral).    Substance History: Etoh abuse    Reported/Observed/Food/Nutrition Related History:     Pt resting in bed, is trying to pull off Bipap, + D5%LR@75ml/hr, levophed 0.14mcg    Per RN: pt has been confused, not appropriate to eat, had tarry dark stools last night    Per MD: plan to place keofeed, start EN    Anthropometrics   Height: 65in  Weight: 205lb stated (ABW)  BMI: 34  BMI classification: Obese Class I: 30-34.9kg/m2   IBW: 125lb    Assessment ---6-3-21------------------------------------------------------------------------------------    14kcal per kg 1305kcal  MSJ 1523kcal    goal ~1500kcal    1-1.2g protein per kg ABW= 93-112g protein  2g protein per kg IBW= 114g protein    goal ~93g with current renal function    Labs reviewed     Results from last 7 days   Lab Units 06/03/21  0949 06/03/21  0702   SODIUM mmol/L 120* 116*   POTASSIUM mmol/L  --  3.4*   CHLORIDE mmol/L  --  78*   CO2 mmol/L  --  20.0*   BUN mg/dL  --  23*   CREATININE mg/dL  --  4.81*   CALCIUM mg/dL  --  8.6   BILIRUBIN mg/dL  --  9.2*   ALK PHOS U/L  --  102   ALT (SGPT) U/L  --  454*   AST (SGOT) U/L  --  2,023*   GLUCOSE mg/dL  --  126*       Results from last 7 days   Lab Units 06/03/21  0559 06/03/21  0006 06/02/21  1924 06/02/21  1628  06/02/21  1404 06/02/21  1239   GLUCOSE mg/dL 132* 107 106 98 98 93     Lab Results   Lab Value Date/Time    HGBA1C 4.6 (L) 06/01/2021 2246    HGBA1C 5.20 03/25/2020 0853       Medications reviewed   Pertinent:abx, albumin, folate, MVI, thiamine, D5%LR@75ml/hr, levophed    Intake/Ouptut 24 hrs (7:00AM - 6:59 AM)     Intake & Output (last day)       06/02 0701 - 06/03 0700 06/03 0701 - 06/04 0700    I.V. (mL/kg) 2908 (31.3)     Blood 720     IV Piggyback 300     Total Intake(mL/kg) 3928 (42.2)     Urine (mL/kg/hr) 330 (0.1) 85 (0.2)    Total Output 330 85    Net +3598 -85                     GI:wnl    SKIN:excoriated, coccyx- skin tear    Current Nutrition Prescription     PO: NPO    Nutrition Diagnosis   Date: 6-2-21, 6-3  Problem Predicted suboptimal energy intake   Etiology Per Clinical Status   Signs/Symptoms Pt found down at home, extremely weak     Status: plan top place keofeed, start EN    Nutrition Intervention   1.  Follow treatment progress, Menu adjusted    Once keofeed placed, start Impact Peptide 1.5 at 25ml, advance as tolerated to goal rate @50ml/hr, no free water except for flushes with meds    TF at goal will provide 1000ml, 1500kcal, 94g protein, 770ml free water    Continue to monitor Na+ to avoid rapid correction    Goal:   General: Nutrition support treatment  Initiate EN    Monitoring/Evaluation:   Per protocol, I&O, PO intake, Supplement intake, Pertinent labs, Weight, Skin status, GI status, Symptoms, Hemodynamic stability    Nohemy Dickinson, WILLIAM  Time Spent: 60min

## 2021-06-03 NOTE — CASE MANAGEMENT/SOCIAL WORK
Discharge Planning Assessment  [facility]     Patient Name: Laura Jang  MRN: [id number]  Today's Date: 6/3/2021    Admit Date: 6/1/2021    Discharge Needs Assessment     Row Name 06/03/21 1205       Living Environment    Lives With  alone    Current Living Arrangements  home/apartment/condo    Primary Care Provided by  self    Provides Primary Care For  no one    Family Caregiver if Needed  none    Quality of Family Relationships  disruptive    Able to Return to Prior Arrangements  yes       Resource/Environmental Concerns    Resource/Environmental Concerns  none       Transition Planning    Patient/Family Anticipated Services at Transition         Discharge Needs Assessment    Readmission Within the Last 30 Days  no previous admission in last 30 days    Equipment Currently Used at Home  none    Concerns to be Addressed  discharge planning    Anticipated Changes Related to Illness  none    Equipment Needed After Discharge  none        Discharge Plan     Row Name 06/03/21 1201       Plan    Plan  Social work called the patients sister Davin Delgado, [phone] and she said that she has not talked to her sister in a while.  She said that the patient is  to Michael Jang, [phone] and he lives in [facility] and according to her sister the patient is estranged from Michael Jang. The patient lives by herself in [place] and she was found by her friends. Case management will continue to follow the patient for discharge planning needs.        Continued Care and Services - Admitted Since 6/1/2021    Coordination has not been started for this encounter.         Demographic Summary     Row Name 06/03/21 1204       General Information    Admission Type  inpatient    Arrived From  home    Referral Source  patient    Reason for Consult  discharge planning    Preferred Language  English       Contact Information    Permission Granted to Share Info With      Contact Information Obtained  for          Functional Status     Row Name 06/03/21 1205       Functional Status    Usual Activity Tolerance  poor    Current Activity Tolerance  fair       Functional Status, IADL    Medications  assistive person    Meal Preparation  assistive person    Housekeeping  assistive person    Laundry  assistive person    Shopping  assistive person       Employment/    Employment Status  disabled        Psychosocial    No documentation.       Abuse/Neglect    No documentation.       Legal    No documentation.       Substance Abuse    No documentation.       Patient Forms    No documentation.                 TESSIE Goode

## 2021-06-03 NOTE — PROGRESS NOTES
"GI Daily Progress Note  Subjective     Radha Patiño is a 55 y.o. female who was admitted with Sepsis due to Gram negative bacteria (CMS/[facility]).    Unchanged  Still on pressors.  Chief Complaint:  Elevated LFT's    Objective     BP (!) 87/57 (BP Location: Left arm, Patient Position: Lying)   Pulse 82   Temp 98.4 °F (36.9 °C) (Bladder)   Resp 28   Ht 165.1 cm (65\")   Wt 93 kg (205 lb)   SpO2 93%   BMI 34.11 kg/m²     Intake/Output last 3 shifts:  I/O last 3 completed shifts:  In: 4736.4 [I.V.:3516.4; Blood:720; IV Piggyback:500]  Out: 880 [Urine:880]  Intake/Output this shift:  I/O this shift:  In: -   Out: 50 [Urine:50]      Physical Exam  Wt Readings from Last 3 Encounters:   06/02/21 93 kg (205 lb)   03/10/21 92.8 kg (204 lb 9.6 oz)   02/05/21 90.4 kg (199 lb 4.7 oz)   ,body mass index is 34.11 kg/m².,@FLOWAMB(6)@,@FLOWAMB(5)@,@FLOWAMB(8)@   CONSTITUTIONAL:sleeping  Resp CTA; no rhonchi, rales, or wheezes.  Respiration effort normal  CV RRR; no M/R/G. Trace lower extremity edema  GI Abd soft, NT, ND, normal active bowel sounds.    Psych: sleeping    DATA:Results for RADHA PATIÑO (MRN [id number]) as of 6/3/2021 09:08   Ref. Range 6/3/2021 07:02   INR Latest Ref Range: 0.85 - 1.16  3.34 (H)   Results for RADHA PATIÑO (MRN [id number]) as of 6/3/2021 09:08   Ref. Range 8/28/2020 09:44 6/1/2021 17:36 6/2/2021 05:53 6/2/2021 23:33 6/3/2021 07:02   AST (SGOT) Latest Ref Range: 1 - 32 U/L 35 (H) 2,211 (H) 1,789 (H) 2,392 (H) 2,023 (H)   Results for RADHA PATIÑO (MRN [id number]) as of 6/3/2021 09:08   Ref. Range 8/28/2020 09:44 6/1/2021 17:36 6/2/2021 05:53 6/2/2021 23:33 6/3/2021 07:02   ALT (SGPT) Latest Ref Range: 1 - 33 U/L 20 453 (H) 385 (H) 510 (H) 454 (H)     Results for RADHA PATIÑO (MRN [id number]) as of 6/3/2021 09:08   Ref. Range 6/2/2021 05:53 6/2/2021 23:33 6/3/2021 07:02   Lactate Latest Ref Range: 0.5 - 2.0 mmol/L 8.7 (C) 5.9 (C) 3.7 (C)   Results for RADHA PATIÑO (MRN [id number]) as of 6/3/2021 09:08   Ref. " Range 6/1/2021 17:36 6/2/2021 05:53 6/2/2021 23:33 6/3/2021 07:02   WBC Latest Ref Range: 3.40 - 10.80 10*3/mm3 12.97 (H) 9.13  10.95 (H)   RBC Latest Ref Range: 3.77 - 5.28 10*6/mm3 2.45 (L) 2.11 (L)  2.90 (L)   Hemoglobin Latest Ref Range: 12.0 - 15.9 g/dL 8.6 (L) 7.5 (L) 10.8 (L) 9.7 (L)   Hematocrit Latest Ref Range: 34.0 - 46.6 % 25.0 (L) 22.0 (L) 32.6 (L) 28.5 (L)     Assessment/Plan       1.  Transaminitis  2.  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  EtOH abuse  4.  Hyponatremia  5.  ABDI  6.  Metabolic acidosis  7.  Elevated creatinine kinase/ Rhabdo  8.  Elevated proBNP  9.  Status post sleeve gastrectomy, cholecystectomy  10.  Coagulopathy  7.  Malnutrition      Mild improvement in LFT;s.  Will be delayed due to underling cirrhosis  OK to begin TF   Mild improvement in INR and lactic acidosis  Cr stable      Sepsis and septic shock due to Gram negative bacteria.  Urinary tract origin (CMS/HCC)    Alcoholism (CMS/HCC)    Moderate episode of recurrent major depressive disorder (CMS/HCC)    Lactic acidosis    Hyponatremia    Hypokalemia    Hypoproteinemia (CMS/HCC)    Elevated bilirubin    Leukocytosis    Severe anemia    UTI (urinary tract infection)    Acute renal failure (CMS/HCC)    Shock liver       LOS: 2 days     Gianni Martinez MD  06/03/21  09:21 EDT

## 2021-06-03 NOTE — CASE MANAGEMENT/SOCIAL WORK
Continued Stay Note  [facility]     Patient Name: Laura Jang  MRN: [id number]  Today's Date: 6/3/2021    Admit Date: 6/1/2021    Discharge Plan     Row Name 06/03/21 5771       Plan    Plan  DIPTI follow-up    Patient/Family in Agreement with Plan  yes    Plan Comments  SW spoke with RN, Sofya and pt's sister Davin Tejada .  DIPTI also attempted to call pt's spouse, Michael Jang, three times this afternoon/evening but contact has been unsuccessful.  There is also no way of leaving him a voice message.  In this event, decision making and consent goes to pt's next of kin/sister, Ms. Tejada.      Ms. Tejada is aware of this, as is pt's RN.  DIPTI will remain available to assist as needed.  TESSIE Soler @ [phone]    Final Discharge Disposition Code  30 - still a patient        Discharge Codes    No documentation.             TESSIE Drew

## 2021-06-03 NOTE — PROGRESS NOTES
"Intensivist Note     6/3/2021  Hospital Day: 2  * No surgery found *  ICU Stays Timeline            Hospital Admission: 06/01/21 1712 - Current  ICU stays: 1      In Date/Time Event Department ICU Stay Duration     06/01/21 1712 Admission  [facility]      06/01/21 2317 Transfer In  [facility] 1 day 8 hours 2 minutes             Ms. Laura Jang, 55 y.o. female is followed for:    Sepsis and septic shock due to Gram negative bacteria.  Urinary tract origin (CMS/HCC)    UTI (urinary tract infection)    Acute renal failure (CMS/HCC)    Shock liver    Lactic acidosis    Hyponatremia    Hypokalemia    Severe anemia    Alcoholism (CMS/HCC)    Moderate episode of recurrent major depressive disorder (CMS/HCC)    Hypoproteinemia (CMS/HCC)    Elevated bilirubin    Leukocytosis       SUBJECTIVE     55-year-old white female non-smoker with a history of significant alcoholism, hypertension, obesity and both sleeve gastrectomy, lap band, and cholecystectomy. Has significant chronic anxiety/depression, and DDD.  Apparently lost her job 4 weeks ago and has had increased alcohol intake.  With the help of her therapist, 2 weeks ago began to wean off alcohol and began to note increasing weakness and lethargy.  Was also apparently begun on unknown \"new medication\".  On 6/1/2021 a friend who had not talked to her in several days went by patient's house and found her on the floor surrounded by beer cans with fecal incontinence.  Upon EMS arrival glucose was in the 30s and patient was hypotensive with a SBP in the 80s.  D50 led to improvement in mentation and she was transferred to [facility] ER.  In the ER hematocrit 25, WBC 12.97 with a left shift, procalcitonin 3.31, platelets 183, sodium 107, potassium 3.0, chloride 64, bicarb 16, BUN 18, creatinine 4.48, alk phos 139, , AST 2,211, bilirubin 6.7, amylase 56, INR 2.82, serum ammonia 58, and albumin 3.0.  Cortisol was 65. ProBNP 3,736.  ABGs revealed pH 7.42, PCO2 24, PO2 80 on " room air but she had a lactic acid of 9.2.. Urinalysis revealed TNTC  WBCs and 4+ bacteria.  Patient was cultured up and received a dose of Zosyn but never received vancomycin.  Was also begun on fluids and pressors.  By 6/2/2021 patient seemed to feel better with fluids, pressors, and antimicrobial therapy.  Although she continued to have a lactic acidosis it had improved.  In addition mentation seemed better per her report.  Although no GI losses her hematocrit had dropped to 22 and CVP was only 5-10 because of this she received blood transfusions.  Her blood cultures returned greater than 100,000 gram-negative rods and she was continued on Zosyn alone.  Renal function remained compromised but it was hopeful that it would rebound.    Interval history: Remains hypotensive and still on norepinephrine despite continued fluids. After 2 units PRBC yesterday her hematocrit has been noted to increase from 22 yesterday, to 28 today. She has been noted to have some black tarry stools but is not actively bleeding. Today she is more confused and encephalopathic probably because her serum ammonia has increased from 58-70.  Transaminases remain elevated but have improved.  Bilirubin on the other hand however has increased from 6.7, to 10.0.  UOP remains quite low but BUN unchanged and creatinine has improved slightly from 4.94, to 4.85 and potassium only 3.4.  Because of her confusion she cannot be trusted with oral intake, has been made n.p.o, and IV fluids are being continued.  Gas exchange remains good with an O2 sat of 93% on room air, but she seems to be breathing shallow and rapidly.  Patient was admitted severely hyponatremic with a sodium of 107 and this has slowly increased since admission to today's value of 122.      ROS: Per subjective, all other systems reviewed and were negative.    The patient's relevant PMH, PSH, FH, and SH were reviewed and updated in Epic as appropriate. Allergies and Medications  "reviewed.    OBJECTIVE     /52 (BP Location: Left arm, Patient Position: Lying)   Pulse 80   Temp 98.2 °F (36.8 °C) (Bladder)   Resp 24   Ht 165.1 cm (65\")   Wt 93 kg (205 lb)   SpO2 98%   BMI 34.11 kg/m²   Oxygen Concentration (%): 30       Flowsheet Rows      First Filed Value   Admission Height  165.1 cm (65\") Documented at 06/01/2021 1730   Admission Weight  93 kg (205 lb) Documented at 06/01/2021 1730        Intake & Output (last day)       06/02 0701 - 06/03 0700 06/03 0701 - 06/04 0700    I.V. (mL/kg) 2908 (31.3)     Blood 720     IV Piggyback 300     Total Intake(mL/kg) 3928 (42.2)     Urine (mL/kg/hr) 330 (0.1)     Total Output 330     Net +3598                 Exam:  General Exam:  Acute on chronically ill white female propped up slightly in bed.  Appears tachypneic but in no distress.  Obviously confused and babbling  HEENT: Pupils equal and reactive. Nose and throat clear.  Neck:                          Supple, no JVD, thyromegaly, or adenopathy.  Left IJ line in place.  Lungs: Mild rhonchi and has a poor cough.  Cardiovascular: RRR without murmurs or gallops.  HR 82 bpm  Abdomen: Soft and nontender.  Quite obese with some distention and difficult to tell if there is any ascites   and rectal: Nunes catheter in place  Extremities: No cyanosis clubbing edema.  Neurologic:                 Symmetric strength with no focal deficits but extremely weak.  Will follow some commands but is severely confused and encephalopathic and making no sense    Chest X-Ray: No film today and yesterday's film just revealed cardiomegaly, left IJ line in position, and some mild infiltrate in the left lower lobe    INFUSIONS  dextrose 5 % and lactated Ringer's, 75 mL/hr, Last Rate: 75 mL/hr (06/03/21 1843)  norepinephrine, 0.02-0.3 mcg/kg/min, Last Rate: 0.14 mcg/kg/min (06/03/21 1647)        Results from last 7 days   Lab Units 06/03/21  1806 06/03/21  0702 06/02/21  2333 06/02/21  0553 06/01/21  1736   WBC " 10*3/mm3  --  10.95*  --  9.13 12.97*   HEMOGLOBIN g/dL 10.5* 9.7* 10.8* 7.5* 8.6*   HEMATOCRIT % 30.4* 28.5* 32.6* 22.0* 25.0*   PLATELETS 10*3/mm3  --  172  --  194 183     Results from last 7 days   Lab Units 06/03/21  1806 06/03/21  0949 06/03/21  0827 06/03/21  0702   SODIUM mmol/L 122* 120* 121*  122* 116*   POTASSIUM mmol/L  --   --  3.5 3.4*   CHLORIDE mmol/L  --   --  82* 78*   CO2 mmol/L  --   --  19.0* 20.0*   BUN mg/dL  --   --  24* 23*   CREATININE mg/dL  --   --  4.92* 4.81*   GLUCOSE mg/dL  --   --  130* 126*   CALCIUM mg/dL  --   --  8.8 8.6     Results from last 7 days   Lab Units 06/03/21  0827 06/03/21  0702 06/02/21  0553   MAGNESIUM mg/dL 2.4 2.3 4.4*   PHOSPHORUS mg/dL 1.6* 1.7* 3.1     Results from last 7 days   Lab Units 06/03/21  0827 06/03/21  0702 06/02/21  2333   ALK PHOS U/L 101 102 111   BILIRUBIN mg/dL 10.0* 9.2* 8.6*   ALT (SGPT) U/L 449* 454* 510*   AST (SGOT) U/L 2,016* 2,023* 2,392*       Lab Results   Component Value Date    SEDRATE 5 06/03/2021     No results found for: BNP  Lab Results   Component Value Date    CKTOTAL 668 (H) 06/01/2021    TROPONINT 0.023 06/01/2021     Lab Results   Component Value Date    TSH 3.750 06/01/2021    TSH 3.750 06/01/2021     Lab Results   Component Value Date    LACTATE 3.7 (C) 06/03/2021     Lab Results   Component Value Date    CORTISOL 65.14 06/01/2021       Results from last 7 days   Lab Units 06/02/21  0634   PH, ARTERIAL pH units 7.421   PCO2, ARTERIAL mm Hg 23.9*   PO2 ART mm Hg 80.5*   HCO3 ART mmol/L 15.5*   FIO2 % 21         I reviewed the patient's results, images and medication.    Assessment/Plan   ASSESSMENT        Sepsis and septic shock due to Gram negative bacteria.  Urinary tract origin (CMS/HCC)    UTI (urinary tract infection)    Acute renal failure (CMS/HCC)    Shock liver    Lactic acidosis    Hyponatremia    Hypokalemia    Severe anemia    Alcoholism (CMS/HCC)    Moderate episode of recurrent major depressive disorder  (CMS/HCC)    Hypoproteinemia (CMS/HCC)    Elevated bilirubin    Leukocytosis      DISCUSSION: Patient remains critically ill with urosepsis/lactic acidosis and the need for parenteral pressors, cirrhosis, probable hepatorenal syndrome versus shock liver and ATN from urosepsis, worsened hepatic encephalopathy (plus minus alcohol withdrawal), hyponatremia, upper GI bleed of unknown cause (ulcer, varices, etc.) with associated coagulopathy.  Prognosis very guarded.    PLAN     1.  Maintain IVF fluids and pressors as needed to support blood pressure  2.  If UOP does not increase and renal function begin to recover by the a.m. we will asked nephrology to see  3.  Begin enteral feeds with Nepro and keep patient n.p.o.  4.  Because of tachypnea due to acidosis as well as some degree of underventilation will try BiPAP if she will tolerate it  5.  Continue Zosyn  6.  Lactulose and rifaxamin  7.  Hemoglobin/hematocrit every 6 hours and transfuse if necessary  8.  If hematocrit continues to drop will need upper endoscopy    Plan of care and goals reviewed with multidisciplinary team at daily rounds.    I discussed the patient's findings and my recommendations with family and nursing staff (discussed the situation with patient's daughter who is a physician in [place].  She indicated that her  while they are estranged wishes to make all decisions regarding CODE STATUS etc.)    Patient is critically ill due to innumerable problems as listed in above discussion and has a high risk of life-threatening decline in condition.  She requires continuous monitoring and frequent reassessment of condition for adjustment of management in order to lessen risk.  I visited the patient's bedside and interacted with nurse numerous times today.    Time spent Critical care 45 min (It does not include procedure time).    Electronically signed by Candido Capps MD, 06/03/21, 7:19 AM EDT.   Pulmonary / Critical care medicine

## 2021-06-04 PROBLEM — E83.39 HYPOPHOSPHATEMIA: Status: ACTIVE | Noted: 2021-01-01

## 2021-06-04 NOTE — PROGRESS NOTES
Clinical Nutrition     Multidisciplinary Rounds      Patient Name: Laura Jang  Date of Encounter: 06/04/21 10:51 EDT  MRN: [id number]  Admission date: 6/1/2021      Reason for visit: MDR. RD to continue to follow per protocol.     Pt resting in bed, on room air, moaning, + D5%LR@75ml, levophed 0.08mcg    Per RN: mental status worse, has some red tinged stool, Renal and WOC consulted    Current diet: NPO Diet  Orders Placed This Encounter      Diet, Tube Feeding Tube Feeding Formula: Impact Peptide 1.5 (Peptide Based with Arginine, Calorically Dense)  goal rate @50ml/hr, no free water      Intake:411ml, 41% goal volume    Intervention:  Follow treatment plan  Care plan reviewed    Follow up:   Per protocol      Nohemy Dickinson RD  10:51 EDT  Time: 20min

## 2021-06-04 NOTE — NURSING NOTE
Daily Low Tashi <=14    Tashi score:13    At this time the patient's RN reports worsening excoriation/MASD on bottom. Will order crusting.   Interventions in place. Continue to provide good general skin care. Apply barrier cream daily/ PRN. Keep patient dry and turn regularly.  Elevate and offload heels. The patient is on a  isolybrium mattress at this time.     Please contact WOC if new skin issues arise.

## 2021-06-04 NOTE — CONSULTS
Referring Provider: Dr. Candido Capps  Reason for Consultation: Acute kidney injury    Subjective     Chief complaint sepsis UTI    History of present illness:    55-year-old white female with history of alcohol abuse, morbid obesity, hypertension, history of sleeve gastrectomy, lap band and cholecystectomy..  Patient admitted on 6/1/2021 patient was found on the floor surrounded by beer cans and fecal incontinence.  She was hypoglycemic blood sugar 30s, hypotensive blood pressure in the 80s.  On arrival serum sodium 107, potassium 3.0 creatinine 4.48, BUN 18, bicarb 16, ammonia 58, albumin 3.0, bilirubin 6.7, amylase 56, cortisol level 65 proBNP 3000 736.  Urine showed too numerous to count WBC and 4+ bacteria patient started on antibiotic including Zosyn and vancomycin.  He was placed on IV pressors..  Patient received IV blood transfusion due to drop in hematocrit to 22.  Patient was confused encephalopathic..  Today's labs shows sodium 125, creatinine 4.69, BUN 28, potassium 2.6, bicarb 22, AST 1000 690, , bilirubin 10.5, eGFR 10, phosphorus 0.5, lactate 2.8, proBNP 18,671, CT abdomen shows no hydronephrosis, renal calculi or renal mass  Patient is unable to answer any questions at this time.  History  Past Medical History:   Diagnosis Date   • Alcohol abuse    • Anxiety    • Arthritis    • Blistering of skin 3/1/2020   • Degenerative disc disease, lumbar    • Depression    • Hypertension    ,   Past Surgical History:   Procedure Laterality Date   • APPENDECTOMY     • BARIATRIC SURGERY      2005, gastric sleeve   • CARPAL TUNNEL RELEASE Bilateral     07/2020   • CHOLECYSTECTOMY     • HERNIA REPAIR      2014   • SPINE SURGERY      cyst removal   ,   Family History   Problem Relation Age of Onset   • Arthritis Mother    • Cancer Mother    • Obesity Mother    • Mental illness Mother    • Hypertension Mother    • Arthritis Sister    • Obesity Sister    • Mental illness Sister    • Hypertension Sister    •  Obesity Brother    • Mental illness Brother    • Hypertension Brother    • Cancer Maternal Grandmother    • Breast cancer Neg Hx    • Ovarian cancer Neg Hx    ,   Social History     Socioeconomic History   • Marital status:      Spouse name: Not on file   • Number of children: Not on file   • Years of education: Not on file   • Highest education level: Not on file   Tobacco Use   • Smoking status: Never Smoker   • Smokeless tobacco: Never Used   Substance and Sexual Activity   • Alcohol use: Yes     Alcohol/week: 6.0 standard drinks     Types: 6 Cans of beer per week     Comment: quit 02/18/2020, prior heavy alcohol use for 15y   • Drug use: Not Currently   • Sexual activity: Not Currently     Partners: Male     Birth control/protection: Post-menopausal     E-cigarette/Vaping     E-cigarette/Vaping Substances     E-cigarette/Vaping Devices       ,   Medications Prior to Admission   Medication Sig Dispense Refill Last Dose   • BIOTIN 5000 PO Take 5,000 mcg by mouth Daily.   Past Week at Unknown time   • Brexpiprazole (Rexulti) 0.5 MG tablet Take 0.5 mg by mouth Daily.   Past Week at Unknown time   • busPIRone (BUSPAR) 10 MG tablet Take 10 mg by mouth 2 (two) times a day.   Past Week at Unknown time   • cetirizine (zyrTEC) 10 MG tablet Take 10 mg by mouth Daily.   Past Week at Unknown time   • Cholecalciferol (VITAMIN D3) 125 MCG (5000 UT) capsule capsule Take 5,000 Units by mouth Daily.   Past Week at Unknown time   • cloNIDine (CATAPRES) 0.1 MG tablet Take 0.1 mg by mouth Daily.   Past Week at Unknown time   • doxepin (SINEquan) 10 MG capsule Take 10 mg by mouth Every Night.   Past Week at Unknown time   • FLUoxetine (PROzac) 20 MG capsule Take 20 mg by mouth Daily.   Past Week at Unknown time   • folic acid (FOLVITE) 1 MG tablet Take 1 tablet by mouth Daily. 90 tablet 1 Past Week at Unknown time   • losartan (COZAAR) 50 MG tablet Take 1 tablet by mouth Daily. 30 tablet 0 Past Week at Unknown time   • vitamin  B-12 (CYANOCOBALAMIN) 1000 MCG tablet Take 3,000 mcg by mouth Daily.   Past Week at Unknown time   • acyclovir (ZOVIRAX) 200 MG capsule Take  by mouth Every 4 (Four) Hours While Awake. 400mg the first day and 200mg for 4 days.   More than a month at Unknown time   • acyclovir (ZOVIRAX) 5 % cream Apply  topically to the appropriate area as directed Every 3 (Three) Hours.   More than a month at Unknown time   • B Complex-Biotin-FA (B-100 B-COMPLEX PO) Take  by mouth Daily.      • clindamycin-benzoyl peroxide (BENZACLIN) 1-5 % gel Apply  topically to the appropriate area as directed Daily. 50 g 11 More than a month at Unknown time   • Multiple Vitamins-Minerals (HAIR SKIN AND NAILS FORMULA PO) Take 2,500 mcg by mouth Daily.   More than a month at Unknown time   • Multiple Vitamins-Minerals (MULTIVITAMIN GUMMIES WOMENS) chewable tablet Chew 50 mg Daily.   More than a month at Unknown time   , Scheduled Meds:  folic acid (FOLVITE) IVPB, 1 mg, Intravenous, Daily  insulin regular, 0-7 Units, Subcutaneous, Q6H  lactulose, 20 g, Nasogastric, TID  Linezolid, 600 mg, Intravenous, Q12H  multivitamin and minerals, 15 mL, Nasogastric, Daily  pantoprazole, 40 mg, Intravenous, Q12H  piperacillin-tazobactam, 3.375 g, Intravenous, Q12H  potassium & sodium phosphates, 1 packet, Nasogastric, BID  rifAXIMin, 400 mg, Nasogastric, Q8H  sodium chloride, 10 mL, Intravenous, Q12H  sodium phosphate IVPB, 21 mmol, Intravenous, Once  thiamine (VITAMIN B1) IVPB, 500 mg, Intravenous, Q8H    , Continuous Infusions:  dextrose 5 % and lactated Ringer's, 75 mL/hr, Last Rate: 75 mL/hr (06/04/21 0904)  norepinephrine, 0.02-0.3 mcg/kg/min, Last Rate: 0.08 mcg/kg/min (06/04/21 0600)    , PRN Meds:  •  dextrose  •  sodium chloride and Allergies:  Patient has no known allergies.    Review of Systems  Review of systems could not be obtained due to   patient confusion. emergent nature of case.    Objective     Vital Signs  Temp:  [98.2 °F (36.8 °C)-99 °F  (37.2 °C)] 99 °F (37.2 °C)  Heart Rate:  [76-85] 80  Resp:  [21-28] 26  BP: ()/(38-66) 102/51    No intake/output data recorded.  I/O last 3 completed shifts:  In: 6446.2 [I.V.:4570.2; Blood:720; NG/GT:531; IV Piggyback:625]  Out: 532 [Urine:532]    Physical Exam:  General Appearance: Confused morbidly obese female, critically ill looking.  HEENT: Atraumatic normocephalic head, eyes icteric, pupils equal, nose no bleed, oral mucosa dry, neck supple, trachea midline, lymph node not enlarged.  Lungs: Clear auscultation, no rales rhonchi's, equal chest movement, nonlabored.  Heart: No gallop, murmur, rub, RRR.  Abdomen: Distended, soft, positive thrill.  Extremities: Positive edema, no cyanosis   Neuro: Cannot be evaluated at this time.  : Nunes catheter in place, dark red urine.  Skin: Spider angiomas on the chest noted.  Skin is warm and dry.  Psych: Cannot be evaluated due to patient's condition.  Time she is agitated.            Results Review:   I reviewed the patient's new clinical results.  I reviewed the patient's new imaging results and agree with the interpretation.    WBC WBC   Date Value Ref Range Status   06/04/2021 13.29 (H) 3.40 - 10.80 10*3/mm3 Final   06/03/2021 10.95 (H) 3.40 - 10.80 10*3/mm3 Final   06/02/2021 9.13 3.40 - 10.80 10*3/mm3 Final   06/01/2021 12.97 (H) 3.40 - 10.80 10*3/mm3 Final      HGB Hemoglobin   Date Value Ref Range Status   06/04/2021 10.6 (L) 12.0 - 15.9 g/dL Final   06/03/2021 10.2 (L) 12.0 - 15.9 g/dL Final   06/03/2021 10.5 (L) 12.0 - 15.9 g/dL Final   06/03/2021 9.7 (L) 12.0 - 15.9 g/dL Final   06/02/2021 10.8 (L) 12.0 - 15.9 g/dL Final     Comment:     Result checked   06/02/2021 7.5 (L) 12.0 - 15.9 g/dL Final   06/01/2021 8.6 (L) 12.0 - 15.9 g/dL Final      HCT Hematocrit   Date Value Ref Range Status   06/04/2021 31.1 (L) 34.0 - 46.6 % Final   06/03/2021 30.1 (L) 34.0 - 46.6 % Final   06/03/2021 30.4 (L) 34.0 - 46.6 % Final   06/03/2021 28.5 (L) 34.0 - 46.6 % Final    06/02/2021 32.6 (L) 34.0 - 46.6 % Final   06/02/2021 22.0 (L) 34.0 - 46.6 % Final   06/01/2021 25.0 (L) 34.0 - 46.6 % Final      Platlets No results found for: LABPLAT   MCV MCV   Date Value Ref Range Status   06/04/2021 98.1 (H) 79.0 - 97.0 fL Final   06/03/2021 98.3 (H) 79.0 - 97.0 fL Final   06/02/2021 104.3 (H) 79.0 - 97.0 fL Final   06/01/2021 102.0 (H) 79.0 - 97.0 fL Final          Sodium Sodium   Date Value Ref Range Status   06/04/2021 125 (L) 136 - 145 mmol/L Final   06/03/2021 122 (L) 136 - 145 mmol/L Final   06/03/2021 120 (L) 136 - 145 mmol/L Final   06/03/2021 122 (L) 136 - 145 mmol/L Final   06/03/2021 121 (L) 136 - 145 mmol/L Final   06/03/2021 116 (C) 136 - 145 mmol/L Final   06/03/2021 120 (L) 136 - 145 mmol/L Final   06/02/2021 117 (C) 136 - 145 mmol/L Final   06/02/2021 114 (C) 136 - 145 mmol/L Final   06/02/2021 112 (C) 136 - 145 mmol/L Final   06/02/2021 117 (C) 136 - 145 mmol/L Final   06/02/2021 114 (C) 136 - 145 mmol/L Final   06/02/2021 111 (C) 136 - 145 mmol/L Final   06/02/2021 113 (C) 136 - 145 mmol/L Final     Comment:     Confirmed/called   06/02/2021 109 (C) 136 - 145 mmol/L Final     Comment:     Confirmed/called   06/01/2021 110 (C) 136 - 145 mmol/L Final     Comment:     Confirmed/called   06/01/2021 113 (C) 136 - 145 mmol/L Final     Comment:     Confirmed/called   06/01/2021 107 (C) 136 - 145 mmol/L Final      Potassium Potassium   Date Value Ref Range Status   06/04/2021 2.9 (L) 3.5 - 5.2 mmol/L Final   06/03/2021 3.5 3.5 - 5.2 mmol/L Final   06/03/2021 3.4 (L) 3.5 - 5.2 mmol/L Final   06/02/2021 3.3 (L) 3.5 - 5.2 mmol/L Final   06/02/2021 3.3 (L) 3.5 - 5.2 mmol/L Final   06/02/2021 3.7 3.5 - 5.2 mmol/L Final   06/02/2021 3.6 3.5 - 5.2 mmol/L Final   06/02/2021 3.1 (L) 3.5 - 5.2 mmol/L Final   06/02/2021 3.0 (L) 3.5 - 5.2 mmol/L Final   06/02/2021 3.1 (L) 3.5 - 5.2 mmol/L Final     Comment:     Slight hemolysis detected by analyzer. Results may be affected.   06/01/2021 3.2 (L)  3.5 - 5.2 mmol/L Final   06/01/2021 3.0 (L) 3.5 - 5.2 mmol/L Final     Comment:     Slight hemolysis detected by analyzer. Results may be affected.   06/01/2021 3.0 (L) 3.5 - 5.2 mmol/L Final      Chloride Chloride   Date Value Ref Range Status   06/04/2021 88 (L) 98 - 107 mmol/L Final   06/03/2021 82 (L) 98 - 107 mmol/L Final   06/03/2021 78 (L) 98 - 107 mmol/L Final   06/02/2021 76 (L) 98 - 107 mmol/L Final   06/02/2021 72 (L) 98 - 107 mmol/L Final   06/02/2021 75 (L) 98 - 107 mmol/L Final   06/02/2021 74 (L) 98 - 107 mmol/L Final   06/02/2021 71 (L) 98 - 107 mmol/L Final   06/02/2021 70 (L) 98 - 107 mmol/L Final   06/02/2021 68 (L) 98 - 107 mmol/L Final   06/01/2021 69 (L) 98 - 107 mmol/L Final   06/01/2021 72 (L) 98 - 107 mmol/L Final   06/01/2021 64 (L) 98 - 107 mmol/L Final      CO2 CO2   Date Value Ref Range Status   06/04/2021 22.0 22.0 - 29.0 mmol/L Final   06/03/2021 19.0 (L) 22.0 - 29.0 mmol/L Final   06/03/2021 20.0 (L) 22.0 - 29.0 mmol/L Final   06/02/2021 16.0 (L) 22.0 - 29.0 mmol/L Final   06/02/2021 14.0 (L) 22.0 - 29.0 mmol/L Final   06/02/2021 12.0 (L) 22.0 - 29.0 mmol/L Final   06/02/2021 14.0 (L) 22.0 - 29.0 mmol/L Final   06/02/2021 15.0 (L) 22.0 - 29.0 mmol/L Final   06/02/2021 16.0 (L) 22.0 - 29.0 mmol/L Final   06/02/2021 14.0 (L) 22.0 - 29.0 mmol/L Final   06/01/2021 14.0 (L) 22.0 - 29.0 mmol/L Final   06/01/2021 15.0 (L) 22.0 - 29.0 mmol/L Final   06/01/2021 16.0 (L) 22.0 - 29.0 mmol/L Final      BUN BUN   Date Value Ref Range Status   06/04/2021 28 (H) 6 - 20 mg/dL Final   06/03/2021 24 (H) 6 - 20 mg/dL Final   06/03/2021 23 (H) 6 - 20 mg/dL Final   06/02/2021 22 (H) 6 - 20 mg/dL Final   06/02/2021 21 (H) 6 - 20 mg/dL Final   06/02/2021 21 (H) 6 - 20 mg/dL Final   06/02/2021 21 (H) 6 - 20 mg/dL Final   06/02/2021 20 6 - 20 mg/dL Final   06/02/2021 19 6 - 20 mg/dL Final   06/02/2021 20 6 - 20 mg/dL Final   06/01/2021 19 6 - 20 mg/dL Final   06/01/2021 18 6 - 20 mg/dL Final   06/01/2021 18 6  - 20 mg/dL Final      Creatinine Creatinine   Date Value Ref Range Status   06/04/2021 4.69 (H) 0.57 - 1.00 mg/dL Final   06/03/2021 4.92 (H) 0.57 - 1.00 mg/dL Final   06/03/2021 4.81 (H) 0.57 - 1.00 mg/dL Final   06/02/2021 4.94 (H) 0.57 - 1.00 mg/dL Final   06/02/2021 4.85 (H) 0.57 - 1.00 mg/dL Final   06/02/2021 4.84 (H) 0.57 - 1.00 mg/dL Final   06/02/2021 4.73 (H) 0.57 - 1.00 mg/dL Final   06/02/2021 4.57 (H) 0.57 - 1.00 mg/dL Final   06/02/2021 4.58 (H) 0.57 - 1.00 mg/dL Final   06/02/2021 4.59 (H) 0.57 - 1.00 mg/dL Final   06/01/2021 4.52 (H) 0.57 - 1.00 mg/dL Final   06/01/2021 4.21 (H) 0.57 - 1.00 mg/dL Final   06/01/2021 4.48 (H) 0.57 - 1.00 mg/dL Final      Calcium Calcium   Date Value Ref Range Status   06/04/2021 9.2 8.6 - 10.5 mg/dL Final   06/03/2021 8.8 8.6 - 10.5 mg/dL Final   06/03/2021 8.6 8.6 - 10.5 mg/dL Final   06/02/2021 8.4 (L) 8.6 - 10.5 mg/dL Final   06/02/2021 8.3 (L) 8.6 - 10.5 mg/dL Final   06/02/2021 8.8 8.6 - 10.5 mg/dL Final   06/02/2021 8.3 (L) 8.6 - 10.5 mg/dL Final   06/02/2021 8.0 (L) 8.6 - 10.5 mg/dL Final   06/02/2021 8.7 8.6 - 10.5 mg/dL Final   06/02/2021 8.1 (L) 8.6 - 10.5 mg/dL Final   06/01/2021 8.4 (L) 8.6 - 10.5 mg/dL Final   06/01/2021 8.0 (L) 8.6 - 10.5 mg/dL Final   06/01/2021 8.7 8.6 - 10.5 mg/dL Final      PO4 No results found for: CAPO4   Albumin Albumin   Date Value Ref Range Status   06/04/2021 3.30 (L) 3.50 - 5.20 g/dL Final   06/03/2021 3.60 3.50 - 5.20 g/dL Final   06/03/2021 3.60 3.50 - 5.20 g/dL Final   06/02/2021 2.90 (L) 3.50 - 5.20 g/dL Final   06/02/2021 3.10 (L) 3.50 - 5.20 g/dL Final   06/01/2021 3.00 (L) 3.50 - 5.20 g/dL Final      Magnesium Magnesium   Date Value Ref Range Status   06/04/2021 2.3 1.6 - 2.6 mg/dL Final   06/03/2021 2.4 1.6 - 2.6 mg/dL Final   06/03/2021 2.3 1.6 - 2.6 mg/dL Final   06/02/2021 4.4 (H) 1.6 - 2.6 mg/dL Final   06/02/2021 2.5 1.6 - 2.6 mg/dL Final     Comment:     Result checked   06/01/2021 1.5 (L) 1.6 - 2.6 mg/dL Final    06/01/2021 1.6 1.6 - 2.6 mg/dL Final      Uric Acid No results found for: URICACID         folic acid (FOLVITE) IVPB, 1 mg, Intravenous, Daily  insulin regular, 0-7 Units, Subcutaneous, Q6H  lactulose, 20 g, Nasogastric, TID  Linezolid, 600 mg, Intravenous, Q12H  multivitamin and minerals, 15 mL, Nasogastric, Daily  pantoprazole, 40 mg, Intravenous, Q12H  piperacillin-tazobactam, 3.375 g, Intravenous, Q12H  potassium & sodium phosphates, 1 packet, Nasogastric, BID  rifAXIMin, 400 mg, Nasogastric, Q8H  sodium chloride, 10 mL, Intravenous, Q12H  sodium phosphate IVPB, 21 mmol, Intravenous, Once  thiamine (VITAMIN B1) IVPB, 500 mg, Intravenous, Q8H      dextrose 5 % and lactated Ringer's, 75 mL/hr, Last Rate: 75 mL/hr (06/04/21 0904)  norepinephrine, 0.02-0.3 mcg/kg/min, Last Rate: 0.08 mcg/kg/min (06/04/21 0600)        Assessment/Plan       Sepsis and septic shock due to Gram negative bacteria.  Urinary tract origin (CMS/HCC)    Alcoholism (CMS/[facility])    Moderate episode of recurrent major depressive disorder (CMS/HCC)    Lactic acidosis    Hyponatremia    Hypokalemia    Hypoproteinemia (CMS/HCC)    Elevated bilirubin    Leukocytosis    Severe anemia    UTI (urinary tract infection)    Acute renal failure (CMS/HCC)    Shock liver    1.  Acute renal failure:  2.  Gram-negative septic shock:  3.  UTI.  4.  Abnormal liver enzymes secondary to shock.  5.  Hyponatremia  6.  Severe anemia.  7.  Alcohol abuse  8.  Hypokalemia  9.  Hypophosphatemia: Most likely secondary to alcohol abuse and refeeding syndrome  Plan:  Change lactated Ringer due to hyponatremia to normal saline with 20 of KCl.  Replace potassium p.o. and IV.  Replace phosphorus p.o. and IV please  Check ammonia level and albumin.  Avoid nephrotoxic medications.  Keep systolic blood pressure greater than 100.  Check volume status.  Check labs in the morning.  Daily evaluation for renal replacement therapy will be done.  Adjust medication for the new GFR.  Case  discussed with the medical staff taking care of the patient.  I discussed the patients findings and my recommendations with patient, nursing staff and consulting provider    Mikael Vicente MD  06/04/21  @[facility]

## 2021-06-04 NOTE — NURSING NOTE
WOC consulted for: excoriation on bottom    Assessment and Care provided: Patient recently seen by WOC on 6/2/21. Patient having more BM's, but unable to have FMS per RN.  Bottom is intact, red and blanchable per RN.  Continue to provide good general skin care.  Keep dry.  Barrier wipes and PH cleanser for incontinence episodes.  Z-guard to bottom daily and PRN.     Recommendation(s):     *Maintain good skin care, keep dry, turn q 2 hr, keep heels elevated and offloaded with heel boots.    *Apply z-guard to bottom and bony prominences daily and as needed with incontinence episodes.  *Follow C.A.R.E protocol if medical devices (Bipap, mckeon, Ng tube, etc) are being used.     All skin interventions in place.  Head to toe assessment completed. Heels and bottom blanchable, intact and offloaded. WOC orders placed.    Discussed plan of care with  RN.    Thank you for consulting WOCN.  Will follow PRN  Please contact with questions or if needs arise.

## 2021-06-04 NOTE — PLAN OF CARE
Goal Outcome Evaluation:    Patient alert but disoriented to place, time and events. Patient stable over night remains on norepi, LR D5, bipap over night; ABG results improved. Patient had multiple bowel movements; Bm's do not appear tarry or dark in color; H&H's stable over night. Will assess labs in AM once resulted.

## 2021-06-04 NOTE — CASE MANAGEMENT/SOCIAL WORK
Continued Stay Note  [facility]     Patient Name: Laura Jang  MRN: [id number]  Today's Date: 6/4/2021    Admit Date: 6/1/2021    Discharge Plan     Row Name 06/04/21 1012       Plan    Plan  Cm update    Plan Comments  Patient remains in ICU on Levophed gtt. Patient alert but disoriented to place/time/situation. Sister is now decision maker - Patient was living alone prior to admission - Will need PT/OT eval once appropriate. CM will continue to follow for discharge planning - wicho [phone]    Final Discharge Disposition Code  30 - still a patient        Discharge Codes    No documentation.             Wicho Multani RN

## 2021-06-04 NOTE — NURSING NOTE
WOC consulted for: worsening MASD    Assessment and Care provided: Patient having frequent stools. Unable to have FMS.  Her bilateral medial gluteal are red, open in some areas, but remain blanchable.  This is all related to stool incontinence.  Taught RN staff how to crust over the wounds with stoma powder and barrier spray.  WOC orders placed for detailed instructions for crusting and care of worsening MASD.    Recommendation(s): SEE WOUND CARE ORDERS    *Maintain good skin care, keep dry as possible, turn q 2 hr, keep heels elevated and offloaded with heel boots.    *Apply z-guard to bottom and bony prominences daily and as needed with incontinence episodes.  *Follow C.A.R.E protocol if medical devices (Bipap, mckeon, Ng tube, etc) are being used.     All skin interventions in place.  Head to toe assessment completed. Heels and bottom blanchable, intact and offloaded. WOC orders placed.    Discussed plan of care with  RN.    Thank you for consulting WOCN.  Will  Continue to follow.  Please contact with questions or if needs arise.

## 2021-06-04 NOTE — SIGNIFICANT NOTE
CODE STATUS discussion  I was asked to see the patient and the patient's family by nursing to discuss the patient's wishes regarding CODE STATUS.  Patient's family and documentation note that the patient's  does not wish to make medical decisions and defers to the patient's sister.  I have discussed CODE STATUS with the patient's sister who directs the following:    The patient's POA directs, IN THE EVENT OF CARDIAC ARREST, that NO CPR be administered.    Prior to such an event, the patient/POA directs the following to NOT INCLUDE in care:  Intubation  Cardioversion/defibrillation      DAVIAN Chin, [facility]-BC  Pulmonary and Critical Care Service    Witness: Villa Sy, RN    Addendum:  I mistakenly noted patient's POA as her daughter, when in fact she is her sister who is a pediatrician.

## 2021-06-04 NOTE — PLAN OF CARE
Goal Outcome Evaluation:  Plan of Care Reviewed With: sibling  Progress: no change  Outcome Summary: No significant change during shift.  Patient had multiple bowel movements.  WOC consulted and new skin regimen initiated.  Renal consulted and evaluated patient.  Phosphorus replaced.  Intensivist spoke at length with sister regarding patient treatment plan.  Pt remains on IV fluids/ATBs and levo.  VSS, will continue to monitor.

## 2021-06-04 NOTE — PROGRESS NOTES
"Intensivist Note     6/4/2021  Hospital Day: 3  * No surgery found *  ICU Stays Timeline            Hospital Admission: 06/01/21 1712 - Current  ICU stays: 1      In Date/Time Event Department ICU Stay Duration     06/01/21 1712 Admission  [facility]      06/01/21 2317 Transfer In  [facility] 2 days 8 hours 7 minutes                Ms. Laura Jang, 55 y.o. female is followed for:    Sepsis and septic shock due to Gram negative bacteria.  Urinary tract origin (CMS/HCC)    E. coli UTI (urinary tract infection)    Acute renal failure (CMS/HCC)    Shock liver    Lactic acidosis    Hyponatremia    Hypokalemia    Hypophosphatemia    Severe anemia due to GI losses (source unknown)    Alcoholism (CMS/HCC)    Moderate episode of recurrent major depressive disorder (CMS/HCC)    Hypoproteinemia (CMS/HCC)    Elevated bilirubin    Leukocytosis       SUBJECTIVE     55-year-old white female non-smoker with a history of significant alcoholism, hypertension, obesity and both sleeve gastrectomy, lap band, and cholecystectomy. Has significant chronic anxiety/depression, and DDD.  Apparently lost her job 4 weeks ago and has had increased alcohol intake.  With the help of her therapist, 2 weeks ago began to wean off alcohol and began to note increasing weakness and lethargy.  Was also apparently begun on unknown \"new medication\".  On 6/1/2021 a friend who had not talked to her in several days went by patient's house and found her on the floor surrounded by beer cans with fecal incontinence.  Upon EMS arrival glucose was in the 30s and patient was hypotensive with a SBP in the 80s.  D50 led to improvement in mentation and she was transferred to [facility] ER.  In the ER hematocrit 25, WBC 12.97 with a left shift, procalcitonin 3.31, platelets 183, sodium 107, potassium 3.0, chloride 64, bicarb 16, BUN 18, creatinine 4.48, alk phos 139, , AST 2,211, bilirubin 6.7, amylase 56, INR 2.82, serum ammonia 58, and albumin 3.0.  " Cortisol was 65. ProBNP 3,736.  ABGs revealed pH 7.42, PCO2 24, PO2 80 on room air but she had a lactic acid of 9.2.. Urinalysis revealed TNTC  WBCs and 4+ bacteria.  Patient was cultured up and received a dose of Zosyn but never received vancomycin.  Was also begun on fluids and pressors.  By 6/2/2021 patient seemed to feel better with fluids, pressors, and antimicrobial therapy.  Although she continued to have a lactic acidosis it had improved.  In addition mentation seemed better per her report.  Although initially no GI losses noted, her hematocrit had dropped to 22 and CVP was only 5-10 and because of this was transfused with PRBC.  Her blood cultures returned greater than 100,000 gram-negative rods which grew out a sensitive E. coli and she was continued on Zosyn.  Blood cultures grew a gram-positive flora that was felt to be a contaminant.  Nasal swab was positive for MRSA. Renal function remained compromised but improved slightly    Interval history: Remains critically ill.  Still hypotensive requiring low-dose norepinephrine drip IV fluids.  Because of shallow rapid respirations was placed on BiPAP and did well on this all night without any difficulty with oxygenation.  Remains afebrile on Zosyn alone but as noted above nasal swab was positive for MRSA and chest x-ray reveals some minimal increased markings (may just be atelectasis but cannot rule out an infiltrate).  WBC today is 13.29.  Remains encephalopathic with confusion and constant moaning.  Was begun on lactulose and rifaximin yesterday and repeat serum ammonia today pending.  Is having frequent loose stools since has some breakdown of the skin in that area.  With tactile stimulation and loud verbal stimulation she will open her eyes and try to answer questions but cannot understand her.  Enteral feeds were begun yesterday and she is now at goal with Impact 1.5.  Hyponatremia continues to slowly improve with sodium increasing from 122, to 125 hours.   "UOP remains low with only 402 cc out over the last 24 hours, but BUN remains low at 28, and creatinine has improved dropping from 4.92, to 4.69.    Note that the situation has changed with respect to patient's CODE STATUS.  Her estranged  was close has apparently decided to have the patient's daughter Dr. Tejada make decisions regarding her care.  After discussions last night although we will continue aggressive therapy, patient would not undergo CPR or intubation in the event she deteriorated.       ROS: Unable to obtain due to altered mental status    The patient's relevant PMH, PSH, FH, and SH were reviewed and updated in Epic as appropriate. Allergies and Medications reviewed.    OBJECTIVE     /51   Pulse 80   Temp 99 °F (37.2 °C) (Bladder)   Resp 26   Ht 165.1 cm (65\")   Wt 93 kg (205 lb)   SpO2 94%   BMI 34.11 kg/m²   Oxygen Concentration (%): 28       Flowsheet Rows      First Filed Value   Admission Height  165.1 cm (65\") Documented at 06/01/2021 1730   Admission Weight  93 kg (205 lb) Documented at 06/01/2021 1730        Intake & Output (last day)       06/03 0701 - 06/04 0700 06/04 0701 - 06/05 0700    I.V. (mL/kg) 3420.2 (36.8)     Blood      NG/     IV Piggyback 325     Total Intake(mL/kg) 4276.2 (46)     Urine (mL/kg/hr) 402 (0.2)     Stool 0     Total Output 402     Net +3874.2           Stool Unmeasured Occurrence 2 x           Exam:  General Exam:  Acutely and chronically ill obese white female appearing far older than stated age  HEENT: Pupils equal and reactive.  ND tube in place  Neck:                          Supple, no JVD, thyromegaly, or adenopathy.  Left IJ line in place  Lungs: Scattered rhonchi and diffusely diminished breath sounds  Cardiovascular: RRR without murmurs or gallops.  HR 84 bpm  Abdomen: Soft nontender without organomegaly or masses.  Protuberant but no obvious fluid wave   and rectal: Nunes catheter in place.  Extremities: No cyanosis or clubbing " but trace lower extremity edema.  Neurologic:                 Symmetric strength but diffusely and profoundly weak. No focal deficits.  Dysarthric and difficult to understand.  Constantly moans.  Will follow commands to squeeze hand with loud verbal and tactile stimulation.    Chest X-Ray: Mild cardiomegaly but no congestive changes.  Bibasilar areas of atelectasis plus minus left effusion.  No consolidative infiltrates.  Left IJ line in position.  ND tube passes through the diaphragm    INFUSIONS  lactated ringers, 50 mL/hr  norepinephrine, 0.02-0.3 mcg/kg/min, Last Rate: 0.08 mcg/kg/min (06/04/21 0600)        Results from last 7 days   Lab Units 06/04/21 0317 06/03/21 2324 06/03/21 1806 06/03/21  0702 06/02/21  0553   WBC 10*3/mm3 13.29*  --   --  10.95* 9.13   HEMOGLOBIN g/dL 10.6* 10.2* 10.5* 9.7* 7.5*   HEMATOCRIT % 31.1* 30.1* 30.4* 28.5* 22.0*   PLATELETS 10*3/mm3 162  --   --  172 194     Results from last 7 days   Lab Units 06/04/21 0317 06/03/21 1806 06/03/21  0827   SODIUM mmol/L 125* 122* 121*  122*   POTASSIUM mmol/L 2.9*  --  3.5   CHLORIDE mmol/L 88*  --  82*   CO2 mmol/L 22.0  --  19.0*   BUN mg/dL 28*  --  24*   CREATININE mg/dL 4.69*  --  4.92*   GLUCOSE mg/dL 181*  --  130*   CALCIUM mg/dL 9.2  --  8.8     Results from last 7 days   Lab Units 06/04/21 0317 06/03/21  0827 06/03/21  0702   MAGNESIUM mg/dL 2.3 2.4 2.3   PHOSPHORUS mg/dL 0.5* 1.6* 1.7*     Results from last 7 days   Lab Units 06/04/21 0317 06/03/21  0827 06/03/21  0702   ALK PHOS U/L 147* 101 102   BILIRUBIN mg/dL 10.5* 10.0* 9.2*   BILIRUBIN DIRECT mg/dL 7.5*  --   --    ALT (SGPT) U/L 436* 449* 454*   AST (SGOT) U/L 1,690* 2,016* 2,023*       Lab Results   Component Value Date    SEDRATE 5 06/03/2021     No results found for: BNP  Lab Results   Component Value Date    CKTOTAL 668 (H) 06/01/2021    TROPONINT 0.023 06/01/2021     Lab Results   Component Value Date    TSH 3.750 06/01/2021    TSH 3.750 06/01/2021     Lab Results    Component Value Date    LACTATE 2.8 (C) 06/04/2021     Lab Results   Component Value Date    CORTISOL 65.14 06/01/2021       Results from last 7 days   Lab Units 06/04/21  0405 06/02/21  0634   PH, ARTERIAL pH units 7.456* 7.421   PCO2, ARTERIAL mm Hg 31.6* 23.9*   PO2 ART mm Hg 96.5 80.5*   HCO3 ART mmol/L 22.2 15.5*   FIO2 % 28 21         I reviewed the patient's results, images and medication.    Assessment/Plan   ASSESSMENT        Sepsis and septic shock due to Gram negative bacteria.  Urinary tract origin (CMS/HCC)    E. coli UTI (urinary tract infection)    Acute renal failure (CMS/HCC)    Shock liver    Lactic acidosis    Hyponatremia    Hypokalemia    Hypophosphatemia    Severe anemia due to GI losses (source unknown)    Alcoholism (CMS/HCC)    Moderate episode of recurrent major depressive disorder (CMS/HCC)    Hypoproteinemia (CMS/HCC)    Elevated bilirubin    Leukocytosis      DISCUSSION: Although remains profoundly ill her shock liver is mildly improved as is her acute kidney injury.  She however remains encephalopathic I presume on the basis of the acuity of her illness as well as hepatic encephalopathy.  Attempts are being made with lactulose and rifaximin to lower her ammonia and will need to continue to watch carefully.  Continues with severe electrolyte abnormalities which are being corrected.  As nutrition is begun she has become profoundly hypophosphatemic and will address this today.  I have asked nephrology prior to the weekend just in case her situation deteriorates again but her numbers are actually improved today.  Nutritionally she is on appropriate therapy and this will be continued for now.  We will have to adjust her lactulose because of diarrhea and she needs an FMS.  I remain concerned about the low-grade leukocytosis although she has no fever.  Her E. coli is being covered adequately but she does have a positive MRSA swab from her nares so we will begin Zyvox.    PLAN     1.   Continue fluids and avoid nephrotoxic agents  2.  Continue Zosyn  3.  Empirically begin Zyvox  4.  Mobilize as much as possible (PT/OT)  5.  Nephrology for any additional advice prove her renal function   6.  Continue enteral nutrition  7.  Correct electrolyte disturbance  8.  Continue lactulose (at lower dose) and rifaximin   9.  Place fecal management system, and wound care to look at sacral region  10.  Begin Florastor while on antibiotics    Plan of care and goals reviewed with multidisciplinary team at daily rounds.    I discussed the patient's findings and my recommendations with patient, family and nursing staff    Patient is critically ill due to innumerable problems as outlined above in discussion and has a high risk of life-threatening decline in condition.  They require continuous monitoring and frequent reassessment of condition for adjustment of management in order to lessen risk.  I visited the patient's bedside and interacted with nurse numerous times today.    Time spent Critical care 35 min (It does not include procedure time).    Electronically signed by Candido Capps MD, 06/04/21, 7:24 AM EDT.   Pulmonary / Critical care medicine

## 2021-06-05 PROBLEM — K70.30 ALCOHOLIC CIRRHOSIS (HCC): Status: ACTIVE | Noted: 2021-01-01

## 2021-06-05 PROBLEM — I05.0 MITRAL STENOSIS: Status: ACTIVE | Noted: 2021-01-01

## 2021-06-05 NOTE — PROGRESS NOTES
"   LOS: 4 days    Patient Care Team:  Renetta Santizo MD as PCP - General (Internal Medicine)    Chief Complaint: UTI sepsis  54-year-old, alcohol abuse, cirrhosis of liver, morbid obesity, hypertension, history of sleeve gastrectomy, lap band, cholecystectomy.  Admitted 6/1/2020 21 pound at home laying down on the ground, hypoglycemic hypotensive.  On arrival sodium 147 creatinine 4.48 bilirubin 6.7.  UA shows positive bacteria patient admitted IV antibiotic started in.  Also required IV pressor.  Subjective     Interval History:   Critically ill patient multiple medical problems.  Hypotension on IV norepinephrine.  Confusion  Diarrhea with the use of lactulose  BUN/creatinine remains same, nonoliguric.  Hyponatremia improved.  Hypophosphatemia will need new replacement.  Hypokalemia replacement ordered    Review of Systems:    Review of systems could not be obtained due to  patient confusion. emergent nature of case.    Objective     Vital Sign Min/Max for last 24 hours  Temp  Min: 98.4 °F (36.9 °C)  Max: 99.3 °F (37.4 °C)   BP  Min: 78/48  Max: 121/55   Pulse  Min: 84  Max: 92   Resp  Min: 26  Max: 28   SpO2  Min: 92 %  Max: 100 %   No data recorded   No data recorded     Flowsheet Rows      First Filed Value   Admission Height  165.1 cm (65\") Documented at 06/01/2021 1730   Admission Weight  93 kg (205 lb) Documented at 06/01/2021 1730          No intake/output data recorded.  I/O last 3 completed shifts:  In: 5120.6 [I.V.:1900.6; Other:40; NG/GT:1705; IV Piggyback:1475]  Out: 720 [Urine:720]    Physical Exam:  General Appearance: Confused morbidly obese critically ill  Eyes: PER, EOMI.  Neck: Supple no JVD.  Lungs: Clear auscultation, no rales rhonchi's, equal chest movement, nonlabored.  Heart: No gallop, murmur, rub, RRR.  Abdomen: Soft, nontender, positive bowel sounds, no organomegaly.  Extremities: Positive edema edema, no cyanosis.  Neuro: Cannot be evaluated.  In 4 restraint  Skin spider angiomas " noted.  Skin is warm and dry.  Jaundice   Nunes catheter in place.    WBC WBC   Date Value Ref Range Status   06/05/2021 23.15 (H) 3.40 - 10.80 10*3/mm3 Final   06/04/2021 13.29 (H) 3.40 - 10.80 10*3/mm3 Final   06/03/2021 10.95 (H) 3.40 - 10.80 10*3/mm3 Final      HGB Hemoglobin   Date Value Ref Range Status   06/05/2021 11.0 (L) 12.0 - 15.9 g/dL Final   06/04/2021 10.6 (L) 12.0 - 15.9 g/dL Final   06/03/2021 10.2 (L) 12.0 - 15.9 g/dL Final   06/03/2021 10.5 (L) 12.0 - 15.9 g/dL Final   06/03/2021 9.7 (L) 12.0 - 15.9 g/dL Final   06/02/2021 10.8 (L) 12.0 - 15.9 g/dL Final     Comment:     Result checked      HCT Hematocrit   Date Value Ref Range Status   06/05/2021 31.6 (L) 34.0 - 46.6 % Final   06/04/2021 31.1 (L) 34.0 - 46.6 % Final   06/03/2021 30.1 (L) 34.0 - 46.6 % Final   06/03/2021 30.4 (L) 34.0 - 46.6 % Final   06/03/2021 28.5 (L) 34.0 - 46.6 % Final   06/02/2021 32.6 (L) 34.0 - 46.6 % Final      Platlets No results found for: LABPLAT   MCV MCV   Date Value Ref Range Status   06/05/2021 96.9 79.0 - 97.0 fL Final   06/04/2021 98.1 (H) 79.0 - 97.0 fL Final   06/03/2021 98.3 (H) 79.0 - 97.0 fL Final          Sodium Sodium   Date Value Ref Range Status   06/05/2021 133 (L) 136 - 145 mmol/L Final   06/04/2021 129 (L) 136 - 145 mmol/L Final   06/04/2021 125 (L) 136 - 145 mmol/L Final   06/03/2021 122 (L) 136 - 145 mmol/L Final   06/03/2021 120 (L) 136 - 145 mmol/L Final   06/03/2021 122 (L) 136 - 145 mmol/L Final   06/03/2021 121 (L) 136 - 145 mmol/L Final   06/03/2021 116 (C) 136 - 145 mmol/L Final   06/03/2021 120 (L) 136 - 145 mmol/L Final   06/02/2021 117 (C) 136 - 145 mmol/L Final   06/02/2021 114 (C) 136 - 145 mmol/L Final   06/02/2021 112 (C) 136 - 145 mmol/L Final   06/02/2021 117 (C) 136 - 145 mmol/L Final      Potassium Potassium   Date Value Ref Range Status   06/05/2021 2.9 (L) 3.5 - 5.2 mmol/L Final   06/04/2021 3.3 (L) 3.5 - 5.2 mmol/L Final     Comment:     Specimen hemolyzed.  Results may be  affected.   06/04/2021 2.9 (L) 3.5 - 5.2 mmol/L Final   06/03/2021 3.5 3.5 - 5.2 mmol/L Final   06/03/2021 3.4 (L) 3.5 - 5.2 mmol/L Final   06/02/2021 3.3 (L) 3.5 - 5.2 mmol/L Final   06/02/2021 3.3 (L) 3.5 - 5.2 mmol/L Final   06/02/2021 3.7 3.5 - 5.2 mmol/L Final      Chloride Chloride   Date Value Ref Range Status   06/05/2021 95 (L) 98 - 107 mmol/L Final   06/04/2021 92 (L) 98 - 107 mmol/L Final   06/04/2021 88 (L) 98 - 107 mmol/L Final   06/03/2021 82 (L) 98 - 107 mmol/L Final   06/03/2021 78 (L) 98 - 107 mmol/L Final   06/02/2021 76 (L) 98 - 107 mmol/L Final   06/02/2021 72 (L) 98 - 107 mmol/L Final   06/02/2021 75 (L) 98 - 107 mmol/L Final      CO2 CO2   Date Value Ref Range Status   06/05/2021 22.0 22.0 - 29.0 mmol/L Final   06/04/2021 21.0 (L) 22.0 - 29.0 mmol/L Final   06/04/2021 22.0 22.0 - 29.0 mmol/L Final   06/03/2021 19.0 (L) 22.0 - 29.0 mmol/L Final   06/03/2021 20.0 (L) 22.0 - 29.0 mmol/L Final   06/02/2021 16.0 (L) 22.0 - 29.0 mmol/L Final   06/02/2021 14.0 (L) 22.0 - 29.0 mmol/L Final   06/02/2021 12.0 (L) 22.0 - 29.0 mmol/L Final      BUN BUN   Date Value Ref Range Status   06/05/2021 39 (H) 6 - 20 mg/dL Final   06/04/2021 34 (H) 6 - 20 mg/dL Final   06/04/2021 28 (H) 6 - 20 mg/dL Final   06/03/2021 24 (H) 6 - 20 mg/dL Final   06/03/2021 23 (H) 6 - 20 mg/dL Final   06/02/2021 22 (H) 6 - 20 mg/dL Final   06/02/2021 21 (H) 6 - 20 mg/dL Final   06/02/2021 21 (H) 6 - 20 mg/dL Final      Creatinine Creatinine   Date Value Ref Range Status   06/05/2021 4.31 (H) 0.57 - 1.00 mg/dL Final   06/04/2021 4.31 (H) 0.57 - 1.00 mg/dL Final   06/04/2021 4.69 (H) 0.57 - 1.00 mg/dL Final   06/03/2021 4.92 (H) 0.57 - 1.00 mg/dL Final   06/03/2021 4.81 (H) 0.57 - 1.00 mg/dL Final   06/02/2021 4.94 (H) 0.57 - 1.00 mg/dL Final   06/02/2021 4.85 (H) 0.57 - 1.00 mg/dL Final   06/02/2021 4.84 (H) 0.57 - 1.00 mg/dL Final      Calcium Calcium   Date Value Ref Range Status   06/05/2021 8.9 8.6 - 10.5 mg/dL Final   06/04/2021  9.2 8.6 - 10.5 mg/dL Final   06/04/2021 9.2 8.6 - 10.5 mg/dL Final   06/03/2021 8.8 8.6 - 10.5 mg/dL Final   06/03/2021 8.6 8.6 - 10.5 mg/dL Final   06/02/2021 8.4 (L) 8.6 - 10.5 mg/dL Final   06/02/2021 8.3 (L) 8.6 - 10.5 mg/dL Final   06/02/2021 8.8 8.6 - 10.5 mg/dL Final      PO4 No results found for: CAPO4   Albumin Albumin   Date Value Ref Range Status   06/05/2021 2.90 (L) 3.50 - 5.20 g/dL Final   06/04/2021 3.10 (L) 3.50 - 5.20 g/dL Final   06/04/2021 3.30 (L) 3.50 - 5.20 g/dL Final   06/03/2021 3.60 3.50 - 5.20 g/dL Final   06/03/2021 3.60 3.50 - 5.20 g/dL Final   06/02/2021 2.90 (L) 3.50 - 5.20 g/dL Final      Magnesium Magnesium   Date Value Ref Range Status   06/05/2021 1.9 1.6 - 2.6 mg/dL Final   06/04/2021 2.3 1.6 - 2.6 mg/dL Final   06/03/2021 2.4 1.6 - 2.6 mg/dL Final   06/03/2021 2.3 1.6 - 2.6 mg/dL Final      Uric Acid No results found for: URICACID        Results Review:     I reviewed the patient's new clinical results.    folic acid (FOLVITE) IVPB, 1 mg, Intravenous, Daily  insulin regular, 0-7 Units, Subcutaneous, Q6H  lactulose, 10 g, Nasogastric, TID  Linezolid, 600 mg, Intravenous, Q12H  multivitamin and minerals, 15 mL, Nasogastric, Daily  pantoprazole, 40 mg, Intravenous, Q12H  piperacillin-tazobactam, 3.375 g, Intravenous, Q12H  potassium & sodium phosphates, 2 packet, Nasogastric, TID  rifAXIMin, 400 mg, Nasogastric, Q8H  saccharomyces boulardii, 500 mg, Oral, BID  sodium chloride, 10 mL, Intravenous, Q12H      norepinephrine, 0.02-0.3 mcg/kg/min, Last Rate: 0.08 mcg/kg/min (06/05/21 0600)        Medication Review: Reviewed    Assessment/Plan       Sepsis and septic shock due to Gram negative bacteria.  Urinary tract origin (CMS/HCC)    Alcoholism (CMS/HCC)    Moderate episode of recurrent major depressive disorder (CMS/HCC)    Lactic acidosis    Hyponatremia    Hypokalemia    Hypoproteinemia (CMS/HCC)    Elevated bilirubin    Leukocytosis    Severe anemia due to GI losses (source  unknown)    E. coli UTI (urinary tract infection)    Acute renal failure (CMS/HCC)    Shock liver    Hypophosphatemia      Assessment   1.  Acute renal failure:  2.  Gram-negative septic shock:  3.  UTI.  4.  Abnormal liver enzymes secondary to shock.  Nodular cirrhosis on CT  5.  Hyponatremia: Improved  6.  Severe anemia.  7.  Alcohol abuse  8.  Hypokalemia: Remain low  9.  Hypophosphatemia: Most likely secondary to alcohol abuse and refeeding syndrome: Need replacement  10.  Leukocytosis: Getting worse  Plan  Sodium improved with change from lactated Ringer due to hyponatremia to normal saline with 20 of KCl.  Replace potassium IV 40 mEq ordered  Replace phosphorus IV 21 mEq along with K ordered  Check ammonia level and albumin.  Avoid nephrotoxic medications.  Keep systolic blood pressure greater than 100.  Check volume status.  Check labs in the morning.  Daily evaluation for renal replacement therapy will be done.  Adjust medication for the new GFR.  Case discussed with the medical staff taking care of the patient.     Case discussed with Dr. Capps high risk patient with multiple medical problem prognosis is guarded at this time    Mikael Vicente MD  06/05/21  10:24 EDT

## 2021-06-05 NOTE — PROGRESS NOTES
"GI Daily Progress Note  Subjective:    Chief Complaint: Follow-up elevated LFTs    Patient resting in bed no acute distress; patient is arousable but minimally conversant at time of exam.  Receiving tube feeds via Keofeed; tolerating well.  Still on low-dose Levophed at 0.08 mcg/kg/h. No new or worsening GI symptoms reported.  Does not endorse any pain at time of exam.    Objective:    /61   Pulse 91   Temp 98.6 °F (37 °C) (Bladder)   Resp 28   Ht 165.1 cm (65\")   Wt 93 kg (205 lb)   SpO2 97%   BMI 34.11 kg/m²     Physical Exam  Vitals and nursing note reviewed.   Constitutional:       Appearance: Normal appearance. She is obese.   HENT:      Head: Normocephalic and atraumatic.      Comments: Keofeed secured to nares  Eyes:      General: Scleral icterus present.      Pupils: Pupils are equal, round, and reactive to light.   Cardiovascular:      Rate and Rhythm: Normal rate and regular rhythm.      Pulses: Normal pulses.      Heart sounds: Normal heart sounds.   Pulmonary:      Effort: Pulmonary effort is normal. No respiratory distress.      Breath sounds: Normal breath sounds.   Abdominal:      General: Abdomen is flat. Bowel sounds are normal. There is distension.      Palpations: Abdomen is soft. There is no mass.      Tenderness: There is no abdominal tenderness. There is no guarding or rebound.      Hernia: No hernia is present.      Comments: FMS in place; light-colored stool appreciated in collection system.   Genitourinary:     Comments: Nunes catheter in place; see I/O for volume.   Skin:     General: Skin is warm and dry.      Capillary Refill: Capillary refill takes less than 2 seconds.      Coloration: Skin is not jaundiced or pale.   Neurological:      General: No focal deficit present.      Mental Status: She is alert and oriented to person, place, and time.      Comments: No asterixis appreciated.       Lab  I have personally reviewed most recent cardiac tracings, lab results and " radiology images and interpretations and agree with findings.    Lab Results   Component Value Date    WBC 23.15 (H) 06/05/2021    HGB 11.0 (L) 06/05/2021    HGB 10.6 (L) 06/04/2021    HGB 10.2 (L) 06/03/2021    MCV 96.9 06/05/2021     06/05/2021    INR 3.34 (H) 06/03/2021    INR 3.68 (H) 06/02/2021    INR 2.82 (H) 06/01/2021       Lab Results   Component Value Date    GLUCOSE 122 (H) 06/05/2021    BUN 39 (H) 06/05/2021    CREATININE 4.31 (H) 06/05/2021    EGFRIFNONA 11 (L) 06/05/2021    EGFRIFAFRI  06/05/2021      Comment:      <15 Indicative of kidney failure.    BCR 9.0 06/05/2021     (L) 06/05/2021    K 2.9 (L) 06/05/2021    CO2 22.0 06/05/2021    CALCIUM 8.9 06/05/2021    ALBUMIN 2.90 (L) 06/05/2021    ALKPHOS 231 (H) 06/05/2021    BILITOT 10.1 (H) 06/05/2021    BILIDIR 7.5 (H) 06/04/2021     (H) 06/05/2021     (H) 06/05/2021     Results for RADHA PATIÑO (MRN [id number]) as of 6/5/2021 10:23   Ref. Range 6/5/2021 09:15   Protime Latest Ref Range: 11.4 - 14.4 Seconds 20.1 (H)   INR Latest Ref Range: 0.85 - 1.16  1.79 (H)     Assessment:      Sepsis and septic shock due to Gram negative bacteria.  Urinary tract origin (CMS/HCC)    Alcoholism (CMS/HCC)    Moderate episode of recurrent major depressive disorder (CMS/HCC)    Lactic acidosis    Hyponatremia    Hypokalemia    Hypoproteinemia (CMS/HCC)    Elevated bilirubin    Leukocytosis    Severe anemia due to GI losses (source unknown)    E. coli UTI (urinary tract infection)    Acute renal failure (CMS/HCC)    Shock liver    Hypophosphatemia    1.  Transaminitis, improving  2.  BRITO cirrhosis   -Meld-Na: 36 points.    -Child Carrera: C  3.  Alcohol abuse  4.  Hyponatremia in setting of cirrhosis  5.  Acute kidney injury, creatinine 4.31-GFR 11  6. Metabolic acidosis  7. Status post sleeve gastrectomy, cholecystectomy  8. Coagulopathy  9. Malnutrition    Plan:  >>> LFTs appear to be improving; bilirubin is still elevated greater than 10 which  is not unexpected as it is the last to normalize.  >>> PT/INR markedly improved today.  >>> Continue tube feeds  >>> We will continue to monitor and trend LFTs.    DAVIAN Knight  06/05/21  09:42 EDT

## 2021-06-05 NOTE — PLAN OF CARE
Goal Outcome Evaluation:    Patient remains alert but disoriented to place, situation, and time. Patient mentation improving slightly overnight; eye contact is better as well conversational coherence. Remains on norepi; titrated down over night.  UO approx 200mL over night. X3 BM's over night. FMS placed to preserve skin integrity. Nephrology paged this AM with critical phos; awaiting response.

## 2021-06-05 NOTE — PLAN OF CARE
Goal Outcome Evaluation:  Plan of Care Reviewed With: sibling  Progress: no change   Continues to remain confused and lethargic and restless. Remains on room air. Remains in NSR.  Tube feedings continue as ordered. FMS in place and functioning well. Nunes to bsd with dark oleksandr urine noted. Skin unchanged from assessment. Continue on norepi to maintain SB/P 100 or better. Continue to monitor.

## 2021-06-05 NOTE — PROGRESS NOTES
"Intensivist Note     6/5/2021  Hospital Day: 4  * No surgery found *  ICU Stays Timeline            Hospital Admission: 06/01/21 1712 - Current  ICU stays: 1      In Date/Time Event Department ICU Stay Duration     06/01/21 1712 Admission  [facility]      06/01/21 2317 Transfer In  [facility] 3 days 14 hours 2 minutes             Ms. Laura Jang, 55 y.o. female is followed for:    Sepsis and septic shock due to Gram negative bacteria.  Urinary tract origin (CMS/HCC)    E. coli UTI (urinary tract infection)    Acute renal failure (CMS/HCC)    Shock liver improved    Alcoholic cirrhosis (CMS/HCC)    Lactic acidosis resolved    Hyponatremia resolving    Hypokalemia    Elevated bilirubin    Hypophosphatemia    Severe anemia due to GI losses (source unknown)    Alcoholism (CMS/HCC)    Moderate episode of recurrent major depressive disorder (CMS/HCC)    Leukocytosis    Mild to moderate mitral stenosis on echocardiogram       SUBJECTIVE     55-year-old white female non-smoker with a history of significant alcoholism, hypertension, obesity and both sleeve gastrectomy, lap band, and cholecystectomy. Has significant chronic anxiety/depression, and DDD.  Apparently lost her job 4 weeks ago and has had increased alcohol intake.  With the help of her therapist, 2 weeks ago began to wean off alcohol and began to note increasing weakness and lethargy.  Was also apparently begun on unknown \"new medication\".  On 6/1/2021 a friend who had not talked to her in several days went by patient's house and found her on the floor surrounded by beer cans with fecal incontinence.  Upon EMS arrival glucose was in the 30s and patient was hypotensive with a SBP in the 80s.  D50 led to improvement in mentation and she was transferred to [facility] ER.  In the ER hematocrit 25, WBC 12.97 with a left shift, procalcitonin 3.31, platelets 183, sodium 107, potassium 3.0, chloride 64, bicarb 16, BUN 18, creatinine 4.48, alk phos 139, , AST " 2,211, bilirubin 6.7, amylase 56, INR 2.82, serum ammonia 58, and albumin 3.0.  Cortisol was 65. ProBNP 3,736.  ABGs revealed pH 7.42, PCO2 24, PO2 80 on room air but she had a lactic acid of 9.2.. Urinalysis revealed TNTC  WBCs and 4+ bacteria.  Patient was cultured up and received a dose of Zosyn but never received vancomycin.  Was also begun on fluids and pressors.  By 6/2/2021 patient seemed to feel better with fluids, pressors, and antimicrobial therapy and lactic acid level decreased.  Mentation initially seemed to improve but when hematocrit dropped to 22 she required transfusion with PRBC which was associated with increased serum ammonia and increased confusion.  Urine cultures returned growing a sensitive E. coli and she was continued on Zosyn (a positive flora in her blood culture was felt to be contaminant). Nasal swab PCR however was positive for MRSA. Renal function remained compromised  and nephrology was asked to see.  Patient had remained a full code per her estranged , but after discussions between the  and the patient's daughter who is a physician practicing in [place], decisions were made to proceed with no CPR and DNI but continued limited support.    Interval history: Patient still with marginal blood pressure requiring norepinephrine support but dose has been decreased.  UOP still marginal with only approximately 250 cc per 12 hours shift, but no worsening in BUN/creatinine (39/4.31 respectively today).  Total bilirubin remains steady at 10.1 for the last 3 days but transaminases continue to slowly drop.  Hematocrit remains stable for the last 3 days since 2 units PRBC transfused 6/2/2021. INR improved today to 1.79.  Gas exchange continues to remain good with O2 sats of 94% on room air.  Patient continues to be placed on BiPAP at night to recruit alveoli and maintain adequate lung volumes.  Nutritionally patient is on Impact peptide 1.5 and remains at goal and seems to be  "tolerating it well.  Diarrhea persist and she has an FMS in place (still on lactulose and rifaximin).  Confusional state persists but she seems slightly improved today.  Although T-max only 99.3 patient's WBC increased from 13.29 yesterday, to 23.15 today despite coverage with linezolid and Zosyn.  Patient's CXR today shows almost complete clearing in the right base with improved aeration in the left base.         ROS: Unable to obtain due to confusional state    The patient's relevant PMH, PSH, FH, and SH were reviewed and updated in Epic as appropriate. Allergies and Medications reviewed.    OBJECTIVE     /67   Pulse 91   Temp 97.4 °F (36.3 °C)   Resp 22   Ht 165.1 cm (65\")   Wt 93 kg (205 lb)   SpO2 97%   BMI 34.11 kg/m²     Presently on room air         Flowsheet Rows      First Filed Value   Admission Height  165.1 cm (65\") Documented at 06/01/2021 1730   Admission Weight  93 kg (205 lb) Documented at 06/01/2021 1730        Intake & Output (last day)       06/04 0701 - 06/05 0700 06/05 0701 - 06/06 0700    I.V. (mL/kg) 900.6 (9.7)     Other 40     NG/GT 1174     IV Piggyback 1150     Total Intake(mL/kg) 3264.6 (35.1)     Urine (mL/kg/hr) 505 (0.2) 120 (0.1)    Stool 0 0    Total Output 505 120    Net +2759.6 -120          Stool Unmeasured Occurrence 4 x 1 x          Exam:  General Exam:  Debilitated, chronically ill, obese, confused white female appearing far older than stated age.  HEENT: Pupils equal and reactive.  Nasoduodenal tube in place  Neck:                          Supple, no JVD, thyromegaly, or adenopathy.  Left IJ line in place  Lungs: Clear to auscultation and percussion anteriorly and posteriorly.  Cardiovascular: Regular rate and rhythm without murmurs or gallops.  Abdomen: Soft nontender without organomegaly or masses.  Obese and presumed underlying ascites   and rectal: Nunes catheter in place.  FMS in place  Extremities: No cyanosis or clubbing edema.  Neurologic:               "   Symmetric strength without focal deficits but diffusely weak.  Tries to answer questions but delirious at times, pulling at lines etc.  Is not however combative and will follow commands.  Is oriented only to person    Chest X-Ray: Mild cardiomegaly.  Right lung now completely clear.  Minimal opacity at the left base with better aeration when compared to yesterday.  No consolidative infiltrates.    INFUSIONS  norepinephrine, 0.02-0.3 mcg/kg/min, Last Rate: 0.08 mcg/kg/min (06/05/21 1037)  sodium chloride 0.9 % with KCl 20 mEq, 75 mL/hr, Last Rate: 75 mL/hr (06/05/21 1201)        Results from last 7 days   Lab Units 06/05/21 0321 06/04/21 0317 06/03/21  2324 06/03/21  0702   WBC 10*3/mm3 23.15* 13.29*  --  10.95*   HEMOGLOBIN g/dL 11.0* 10.6* 10.2* 9.7*   HEMATOCRIT % 31.6* 31.1* 30.1* 28.5*   PLATELETS 10*3/mm3 179 162  --  172     Results from last 7 days   Lab Units 06/05/21 0321 06/04/21  1755   SODIUM mmol/L 133* 129*   POTASSIUM mmol/L 2.9* 3.3*   CHLORIDE mmol/L 95* 92*   CO2 mmol/L 22.0 21.0*   BUN mg/dL 39* 34*   CREATININE mg/dL 4.31* 4.31*   GLUCOSE mg/dL 122* 119*   CALCIUM mg/dL 8.9 9.2     Results from last 7 days   Lab Units 06/05/21 0321 06/04/21  1755 06/04/21 0317 06/03/21  0827   MAGNESIUM mg/dL 1.9  --  2.3 2.4   PHOSPHORUS mg/dL 1.6* 0.6* 0.5* 1.6*     Results from last 7 days   Lab Units 06/05/21 0321 06/04/21 0317 06/03/21  0827   ALK PHOS U/L 231* 147* 101   BILIRUBIN mg/dL 10.1* 10.5* 10.0*   BILIRUBIN DIRECT mg/dL  --  7.5*  --    ALT (SGPT) U/L 349* 436* 449*   AST (SGOT) U/L 971* 1,690* 2,016*       Lab Results   Component Value Date    SEDRATE 5 06/03/2021     No results found for: BNP  Lab Results   Component Value Date    CKTOTAL 668 (H) 06/01/2021    TROPONINT 0.023 06/01/2021     Lab Results   Component Value Date    TSH 3.750 06/01/2021    TSH 3.750 06/01/2021     Lab Results   Component Value Date    LACTATE 2.8 (C) 06/04/2021     Lab Results   Component Value Date     CORTISOL 65.14 06/01/2021       Results from last 7 days   Lab Units 06/05/21  0312 06/04/21  0405 06/02/21  0634   PH, ARTERIAL pH units 7.447 7.456* 7.421   PCO2, ARTERIAL mm Hg 32.1* 31.6* 23.9*   PO2 ART mm Hg 68.5* 96.5 80.5*   HCO3 ART mmol/L 22.1 22.2 15.5*   FIO2 % 21 28 21         I reviewed the patient's results, images and medication.    Assessment/Plan   ASSESSMENT        Sepsis and septic shock due to Gram negative bacteria.  Urinary tract origin (CMS/[facility])    E. coli UTI (urinary tract infection)    Acute renal failure (CMS/[facility])    Shock liver improved    Alcoholic cirrhosis (CMS/[facility])    Lactic acidosis resolved    Hyponatremia resolving    Hypokalemia    Elevated bilirubin    Hypophosphatemia    Severe anemia due to GI losses (source unknown)    Alcoholism (CMS/[facility])    Moderate episode of recurrent major depressive disorder (CMS/[facility])    Leukocytosis    Mild to moderate mitral stenosis on echocardiogram      DISCUSSION: No dramatic improvement.  Still hypotensive requiring norepinephrine infusion.  Still with low UOP and persistent elevation of creatinine.  Significant increase in WBC despite broad-spectrum antimicrobial coverage (although pro calcitonin continues to decline).  Transaminases improved but bilirubin remains markedly elevated.  Encephalopathy persists despite lactulose and rifaximin.  Still with significant watery diarrhea and although I normally would not be concerned because of her ongoing tube feeding and lactulose I think that with persistent elevation of procalcitonin and dramatic increase in WBC we have to consider C. difficile colitis.  Another alternative would be spontaneous peritonitis related to her cirrhosis but that should be covered    PLAN     1.  Repeat WBC in a.m.  2.  Stool for C. difficile toxin (I do not care if she is receiving lactulose and tube feeding)  3.  If WBC continues to increase or patient spikes a fever, will change Zosyn to Merrem   4.  Obtain paracentesis  if WBC remains increased  5.  Continue fluids and pressors  6.  Add midodrine to see if will allow norepinephrine to be weaned    Plan of care and goals reviewed with multidisciplinary team at daily rounds.    I discussed the patient's findings and my recommendations with patient and nursing staff    Patient is critically ill due to innumerable problems as listed in discussion and has a high risk of life-threatening decline in condition.  They require continuous monitoring and frequent reassessment of condition for adjustment of management in order to lessen risk.  I visited the patient's bedside and interacted with nurse numerous times today.    Time spent Critical care 35 min (It does not include procedure time).    Electronically signed by Candido Capps MD, 06/05/21, 1:19 PM EDT.   Pulmonary / Critical care medicine

## 2021-06-06 NOTE — PROGRESS NOTES
"Intensivist Note     6/6/2021  Hospital Day: 5  * No surgery found *  ICU Stays Timeline            Hospital Admission: 06/01/21 1712 - Current  ICU stays: 1      In Date/Time Event Department ICU Stay Duration     06/01/21 1712 Admission  [facility]      06/01/21 2317 Transfer In  [facility] 4 days 7 hours 22 minutes             Ms. Laura Jang, 56 y.o. female is followed for:    Sepsis and septic shock due to Gram negative bacteria.  Urinary tract origin (CMS/HCC)    E. coli UTI (urinary tract infection)    Acute renal failure (CMS/HCC)    Shock liver improved    Alcoholic cirrhosis (CMS/HCC)    Lactic acidosis resolved    Hyponatremia resolving    Hypokalemia    Elevated bilirubin    Hypophosphatemia    Severe anemia due to GI losses (source unknown)    Alcoholism (CMS/HCC)    Moderate episode of recurrent major depressive disorder (CMS/HCC)    Leukocytosis    Mild to moderate mitral stenosis on echocardiogram       SUBJECTIVE     55-year-old white female non-smoker with a history of significant alcoholism, hypertension, obesity and both sleeve gastrectomy, lap band, and cholecystectomy. Has significant chronic anxiety/depression, and DDD.  Apparently lost her job 4 weeks ago and has had increased alcohol intake.  With the help of her therapist, 2 weeks ago began to wean off alcohol and began to note increasing weakness and lethargy.  Was also apparently begun on unknown \"new medication\".  On 6/1/2021 a friend who had not talked to her in several days went by patient's house and found her on the floor surrounded by beer cans with fecal incontinence.  Upon EMS arrival glucose was in the 30s and patient was hypotensive with a SBP in the 80s.  D50 led to improvement in mentation and she was transferred to [facility] ER.  In the ER hematocrit 25, WBC 12.97 with a left shift, procalcitonin 3.31, platelets 183, sodium 107, potassium 3.0, chloride 64, bicarb 16, BUN 18, creatinine 4.48, alk phos 139, , AST " 2,211, bilirubin 6.7, amylase 56, INR 2.82, serum ammonia 58, and albumin 3.0.  Cortisol was 65. ProBNP 3,736.  ABGs revealed pH 7.42, PCO2 24, PO2 80 on room air but she had a lactic acid of 9.2.. Urinalysis revealed TNTC  WBCs and 4+ bacteria.  Patient was cultured up and received a dose of Zosyn but never received vancomycin.  Was also begun on fluids and pressors.  By 6/2/2021 patient seemed to feel better with fluids, pressors, and antimicrobial therapy and lactic acid level decreased.  Mentation initially seemed to improve but when hematocrit dropped to 22 she required transfusion with PRBC which was associated with increased serum ammonia and increased confusion.  Urine cultures returned growing a sensitive E. coli and she was continued on Zosyn (a positive flora in her blood culture was felt to be contaminant). Nasal swab PCR however was positive for MRSA. Renal function remained compromised  and nephrology was asked to see.  Patient had remained a full code per her estranged , but after discussions between the  and the patient's daughter who is a physician practicing in [place], decisions were made to proceed with no CPR and DNI but continued limited support.    Interval history: Still requiring norepinephrine for blood pressure support and still with low UOP (450 cc out over 24 hours).  Creatinine continues to slowly drop and is now down to 4.22 (from 4.31), but BUN increased from 39 to 57 despite fluid balance being approximately 13 L positive since admission.  No arrhythmias noted.  Gas exchange remains very good with O2 sats of 93% on room air.  Continues to be placed on BiPAP at night because of shallow rapid respirations and an attempt to rest her diaphragms.  Still with suboptimal cough.  Patient remains afebrile but WBC has increased to 25.86 despite broad-spectrum coverage with Zyvox and Zosyn and (to cover positive MRSA PCR screen and potential pneumonia as well as Zosyn to cover E.  "coli UTI).  Transaminases continue to drop although bilirubin remains unchanged at 10.  As noted INR has returned to 1.79 (from a high of 3.68).  Continues to tolerate enteral nutrition at goal (Impact peptide 50 cc an hour).  Unfortunately encephalopathy is about the same.  She will follow commands with loud verbal as well as tactile stimulation, but remains confused/encephalopathic and difficult to understand.  Remains on lactulose (with significant diarrhea requiring FMS) and rifaximin but todays serum ammonia increased from 56, to 120.         ROS: Unable to obtain due to encephalopathy.    The patient's relevant PMH, PSH, FH, and SH were reviewed and updated in Epic as appropriate. Allergies and Medications reviewed.    OBJECTIVE     /70   Pulse 94   Temp 98.1 °F (36.7 °C) (Oral)   Resp 24   Ht 165.1 cm (65\")   Wt 93 kg (205 lb)   SpO2 98%   BMI 34.11 kg/m²     Presently on room air      Flowsheet Rows      First Filed Value   Admission Height  165.1 cm (65\") Documented at 06/01/2021 1730   Admission Weight  93 kg (205 lb) Documented at 06/01/2021 1730        Intake & Output (last day)       06/05 0701 - 06/06 0700    I.V. (mL/kg) 974 (10.5)    Other 50    NG/GT 1291    IV Piggyback 400    Total Intake(mL/kg) 2715 (29.2)    Urine (mL/kg/hr) 450 (0.2)    Stool 0    Total Output 450    Net +2265         Stool Unmeasured Occurrence 1 x          Exam:  General Exam:  Chronically ill obese white female slightly propped up in bed.  HEENT: Pupils equal and reactive.  NG tube in place  Neck:                          Supple, no JVD, thyromegaly, or adenopathy.  Left IJ line in place  Lungs: Clear to auscultation and percussion anteriorly and posteriorly.  Cardiovascular: RRR without murmurs or gallops.  HR 98 bpm  Abdomen: Soft nontender without organomegaly or masses.  Obese/protuberant   and rectal: Nunes catheter in place  Extremities: No cyanosis or clubbing but mild lower extremity " edema.  Neurologic:                 Symmetric strength diffusely weak. No focal deficits but remains encephalopathic requiring restraints.  Will follow simple commands    CXR: No film today    INFUSIONS  norepinephrine, 0.02-0.3 mcg/kg/min, Last Rate: 0.03 mcg/kg/min (06/06/21 1825)  sodium chloride 0.9 % with KCl 20 mEq, 75 mL/hr, Last Rate: 75 mL/hr (06/05/21 1201)        Results from last 7 days   Lab Units 06/06/21 0342 06/05/21 0321 06/04/21 0317   WBC 10*3/mm3 25.86* 23.15* 13.29*   HEMOGLOBIN g/dL 10.6* 11.0* 10.6*   HEMATOCRIT % 30.8* 31.6* 31.1*   PLATELETS 10*3/mm3 165 179 162     Results from last 7 days   Lab Units 06/06/21 0342 06/05/21 0321   SODIUM mmol/L 134* 133*   POTASSIUM mmol/L 3.1* 2.9*   CHLORIDE mmol/L 99 95*   CO2 mmol/L 21.0* 22.0   BUN mg/dL 57* 39*   CREATININE mg/dL 4.22* 4.31*   GLUCOSE mg/dL 112* 122*   CALCIUM mg/dL 8.7 8.9     Results from last 7 days   Lab Units 06/06/21  0342 06/05/21 0321 06/04/21  1755 06/04/21 0317 06/03/21  0827   MAGNESIUM mg/dL  --  1.9  --  2.3 2.4   PHOSPHORUS mg/dL 2.0* 1.6* 0.6* 0.5* 1.6*     Results from last 7 days   Lab Units 06/06/21 0342 06/05/21  0321 06/04/21 0317   ALK PHOS U/L 247* 231* 147*   BILIRUBIN mg/dL 10.6* 10.1* 10.5*   BILIRUBIN DIRECT mg/dL 8.6*  --  7.5*   ALT (SGPT) U/L 260* 349* 436*   AST (SGOT) U/L 488* 971* 1,690*       Lab Results   Component Value Date    SEDRATE 5 06/03/2021     No results found for: BNP  Lab Results   Component Value Date    CKTOTAL 668 (H) 06/01/2021    TROPONINT 0.023 06/01/2021     Lab Results   Component Value Date    TSH 3.750 06/01/2021    TSH 3.750 06/01/2021     Lab Results   Component Value Date    LACTATE 2.0 06/06/2021     Lab Results   Component Value Date    CORTISOL 65.14 06/01/2021       Results from last 7 days   Lab Units 06/05/21  0312 06/04/21  0405 06/02/21  0634   PH, ARTERIAL pH units 7.447 7.456* 7.421   PCO2, ARTERIAL mm Hg 32.1* 31.6* 23.9*   PO2 ART mm Hg 68.5* 96.5 80.5*    HCO3 ART mmol/L 22.1 22.2 15.5*   FIO2 % 21 28 21         I reviewed the patient's results, images and medication.    Assessment/Plan   ASSESSMENT        Sepsis and septic shock due to Gram negative bacteria.  Urinary tract origin (CMS/[facility])    E. coli UTI (urinary tract infection)    Acute renal failure (CMS/[facility])    Shock liver improved    Alcoholic cirrhosis (CMS/[facility])    Lactic acidosis resolved    Hyponatremia resolving    Hypokalemia    Elevated bilirubin    Hypophosphatemia    Severe anemia due to GI losses (source unknown)    Alcoholism (CMS/[facility])    Moderate episode of recurrent major depressive disorder (CMS/[facility])    Leukocytosis    Mild to moderate mitral stenosis on echocardiogram      DISCUSSION: Remains critically ill.  Still on pressors to maintain blood pressure.  Although creatinine improved UOP is low and BUN starting to climb (despite fluids).  Encephalopathy persists.  Serum ammonia level increased significantly today for unclear reasons.  Because of profound diarrhea lactulose is being held although she remains on rifaximin.  WBC remains persistently high despite Zyvox and Zosyn and we have no additional focus of infection (E. coli UTI and positive MRSA nasal swab).  In addition procalcitonin remains elevated at 2.1.  C. difficile toxin was negative.    PLAN     1.  Abdominal ultrasound and if significant ascites present would tap  2.  Continue present antimicrobial coverage per ID  3.  Although triple-lumen line only 4 days old, if WBC does not improve, would consider replacing it  4.  Potassium and phosphorus replacement  5.  Continue rifaximin  6.  Present IV fluids will end 11 AM tomorrow tomorrow  7.  Continue enteral nutrition but probably needs some fiber  8.  Continue to follow renal function closely    Plan of care and goals reviewed with multidisciplinary team at daily rounds.    I discussed the patient's findings and my recommendations with nursing staff and consulting  provider    Patient is critically ill due to innumerable problems as listed above and has a high risk of life-threatening decline in condition.  They require continuous monitoring and frequent reassessment of condition for adjustment of management in order to lessen risk.  I visited the patient's bedside and interacted with nurse numerous times today.    Time spent Critical care 35 min (It does not include procedure time).    Electronically signed by Candido Capps MD, 06/06/21, 6:39 AM EDT.   Pulmonary / Critical care medicine     Electronically signed by Candido Capps MD, 06/06/21, 6:39 AM EDT.

## 2021-06-06 NOTE — PROGRESS NOTES
"   LOS: 5 days    Patient Care Team:  Renetta Santizo MD as PCP - General (Internal Medicine)    Chief Complaint: UTI sepsis  54-year-old, alcohol abuse, cirrhosis of liver, morbid obesity, hypertension, history of sleeve gastrectomy, lap band, cholecystectomy.  Admitted 6/1/2020 21 pound at home laying down on the ground, hypoglycemic hypotensive.  On arrival sodium 147 creatinine 4.48 bilirubin 6.7.  UA shows positive bacteria patient admitted IV antibiotic started in.  Also required IV pressor.  Subjective     Interval History:   Critically ill patient multiple medical problems.  Hypotension on IV norepinephrine.  Confusion slightly improved  Diarrhea with the use of lactulose       Review of Systems:    Review of systems could not be obtained due to  patient confusion. emergent nature of case.    Objective     Vital Sign Min/Max for last 24 hours  Temp  Min: 97.4 °F (36.3 °C)  Max: 97.9 °F (36.6 °C)   BP  Min: 89/50  Max: 124/58   Pulse  Min: 90  Max: 96   Resp  Min: 22  Max: 28   SpO2  Min: 74 %  Max: 99 %   No data recorded   No data recorded     Flowsheet Rows      First Filed Value   Admission Height  165.1 cm (65\") Documented at 06/01/2021 1730   Admission Weight  93 kg (205 lb) Documented at 06/01/2021 1730          No intake/output data recorded.  I/O last 3 completed shifts:  In: 3985 [I.V.:1274; Other:50; NG/GT:1911; IV Piggyback:750]  Out: 705 [Urine:705]    Physical Exam:  General Appearance: Jaundice morbidly obese  female, comfortable in bed  Eyes: PER, EOMI. icteric  Neck: Supple no JVD.  Lungs: Clear auscultation, no rales rhonchi's, equal chest movement, nonlabored.  Heart: No gallop, murmur, rub, RRR.  Abdomen: Soft, nontender, positive bowel sounds, no organomegaly.  Extremities: Positive edema trace, no cyanosis.  Neuro: Cannot be evaluated.  In 4 restraint  Skin spider angiomas noted.  Skin is warm and dry.  Skin jaundice   Nunes catheter in place.  Dark-colored urine  Rectal " tube in place.    WBC WBC   Date Value Ref Range Status   06/06/2021 25.86 (H) 3.40 - 10.80 10*3/mm3 Preliminary   06/05/2021 23.15 (H) 3.40 - 10.80 10*3/mm3 Final   06/04/2021 13.29 (H) 3.40 - 10.80 10*3/mm3 Final      HGB Hemoglobin   Date Value Ref Range Status   06/06/2021 10.6 (L) 12.0 - 15.9 g/dL Preliminary   06/05/2021 11.0 (L) 12.0 - 15.9 g/dL Final   06/04/2021 10.6 (L) 12.0 - 15.9 g/dL Final   06/03/2021 10.2 (L) 12.0 - 15.9 g/dL Final   06/03/2021 10.5 (L) 12.0 - 15.9 g/dL Final      HCT Hematocrit   Date Value Ref Range Status   06/06/2021 30.8 (L) 34.0 - 46.6 % Preliminary   06/05/2021 31.6 (L) 34.0 - 46.6 % Final   06/04/2021 31.1 (L) 34.0 - 46.6 % Final   06/03/2021 30.1 (L) 34.0 - 46.6 % Final   06/03/2021 30.4 (L) 34.0 - 46.6 % Final      Platlets No results found for: LABPLAT   MCV MCV   Date Value Ref Range Status   06/06/2021 97.8 (H) 79.0 - 97.0 fL Preliminary   06/05/2021 96.9 79.0 - 97.0 fL Final   06/04/2021 98.1 (H) 79.0 - 97.0 fL Final          Sodium Sodium   Date Value Ref Range Status   06/06/2021 134 (L) 136 - 145 mmol/L Final   06/05/2021 133 (L) 136 - 145 mmol/L Final   06/04/2021 129 (L) 136 - 145 mmol/L Final   06/04/2021 125 (L) 136 - 145 mmol/L Final   06/03/2021 122 (L) 136 - 145 mmol/L Final   06/03/2021 120 (L) 136 - 145 mmol/L Final      Potassium Potassium   Date Value Ref Range Status   06/06/2021 3.1 (L) 3.5 - 5.2 mmol/L Final   06/05/2021 2.9 (L) 3.5 - 5.2 mmol/L Final   06/04/2021 3.3 (L) 3.5 - 5.2 mmol/L Final     Comment:     Specimen hemolyzed.  Results may be affected.   06/04/2021 2.9 (L) 3.5 - 5.2 mmol/L Final      Chloride Chloride   Date Value Ref Range Status   06/06/2021 99 98 - 107 mmol/L Final   06/05/2021 95 (L) 98 - 107 mmol/L Final   06/04/2021 92 (L) 98 - 107 mmol/L Final   06/04/2021 88 (L) 98 - 107 mmol/L Final      CO2 CO2   Date Value Ref Range Status   06/06/2021 21.0 (L) 22.0 - 29.0 mmol/L Final   06/05/2021 22.0 22.0 - 29.0 mmol/L Final    06/04/2021 21.0 (L) 22.0 - 29.0 mmol/L Final   06/04/2021 22.0 22.0 - 29.0 mmol/L Final      BUN BUN   Date Value Ref Range Status   06/06/2021 57 (H) 6 - 20 mg/dL Final   06/05/2021 39 (H) 6 - 20 mg/dL Final   06/04/2021 34 (H) 6 - 20 mg/dL Final   06/04/2021 28 (H) 6 - 20 mg/dL Final      Creatinine Creatinine   Date Value Ref Range Status   06/06/2021 4.22 (H) 0.57 - 1.00 mg/dL Final   06/05/2021 4.31 (H) 0.57 - 1.00 mg/dL Final   06/04/2021 4.31 (H) 0.57 - 1.00 mg/dL Final   06/04/2021 4.69 (H) 0.57 - 1.00 mg/dL Final      Calcium Calcium   Date Value Ref Range Status   06/06/2021 8.7 8.6 - 10.5 mg/dL Final   06/05/2021 8.9 8.6 - 10.5 mg/dL Final   06/04/2021 9.2 8.6 - 10.5 mg/dL Final   06/04/2021 9.2 8.6 - 10.5 mg/dL Final      PO4 No results found for: CAPO4   Albumin Albumin   Date Value Ref Range Status   06/06/2021 2.70 (L) 3.50 - 5.20 g/dL Final   06/05/2021 2.90 (L) 3.50 - 5.20 g/dL Final   06/04/2021 3.10 (L) 3.50 - 5.20 g/dL Final   06/04/2021 3.30 (L) 3.50 - 5.20 g/dL Final      Magnesium Magnesium   Date Value Ref Range Status   06/05/2021 1.9 1.6 - 2.6 mg/dL Final   06/04/2021 2.3 1.6 - 2.6 mg/dL Final      Uric Acid No results found for: URICACID        Results Review:     I reviewed the patient's new clinical results.    famotidine, 20 mg, Intravenous, Daily  folic acid (FOLVITE) IVPB, 1 mg, Intravenous, Daily  insulin regular, 0-7 Units, Subcutaneous, Q6H  lactulose, 10 g, Nasogastric, TID  Linezolid, 600 mg, Intravenous, Q12H  midodrine, 5 mg, Nasogastric, TID AC  multivitamin and minerals, 15 mL, Nasogastric, Daily  piperacillin-tazobactam, 3.375 g, Intravenous, Q12H  rifAXIMin, 400 mg, Nasogastric, Q8H  saccharomyces boulardii, 500 mg, Oral, BID  sodium chloride, 10 mL, Intravenous, Q12H      norepinephrine, 0.02-0.3 mcg/kg/min, Last Rate: 0.08 mcg/kg/min (06/05/21 1037)  Pharmacy Consult,   sodium chloride 0.9 % with KCl 20 mEq, 75 mL/hr, Last Rate: 75 mL/hr (06/05/21  1201)        Medication Review: Reviewed    Assessment/Plan       Sepsis and septic shock due to Gram negative bacteria.  Urinary tract origin (CMS/HCC)    Alcoholism (CMS/HCC)    Moderate episode of recurrent major depressive disorder (CMS/HCC)    Lactic acidosis resolved    Hyponatremia resolving    Hypokalemia    Elevated bilirubin    Leukocytosis    Severe anemia due to GI losses (source unknown)    E. coli UTI (urinary tract infection)    Acute renal failure (CMS/HCC)    Shock liver improved    Hypophosphatemia    Mild to moderate mitral stenosis on echocardiogram    Alcoholic cirrhosis (CMS/HCC)      Assessment   1.  Acute renal failure: Nonoliguric creatinine about the same, hypotensive, liver failure likely hepatorenal syndrome  2.  Gram-negative septic shock:  3.  UTI.  4.  Abnormal liver enzymes secondary to shock.  Nodular cirrhosis on CT  5.  Hyponatremia: Improved  6.  Severe anemia.  7.  Alcohol abuse  8.  Hypokalemia: Remain low  9.  Hypophosphatemia: Most likely secondary to alcohol abuse and refeeding syndrome: Need replacement  10.  Leukocytosis: Getting worse, on antibiotics.  Plan  BUN/creatinine remains same, nonoliguric.  No dialysis needed  Hyponatremia improved.  Will monitor  Hypophosphatemia will need more replacement.  Hypokalemia replacement ordered   Replace phosphorus IV 21 mEq along with K ordered  Lactulose is on hold now due to significant diarrhea  Avoid nephrotoxic medications.  Keep systolic blood pressure greater than 100.  Check volume status.  Check labs in the morning.  Daily evaluation for renal replacement therapy will be done.  Adjust medication for the new GFR.  Case discussed with the medical staff and Dr. Capps taking care of the patient.  High risk patient with multiple medical problems    Mikael Vicente MD  06/06/21  08:35 EDT

## 2021-06-06 NOTE — PLAN OF CARE
Goal Outcome Evaluation:    Patient alert but disoriented to place, time and events; obeys simple commands and attempts conversation but is ultimately unable to hold coherent conversation. VSS over night, room air, Norepi weaned down to 0.04.  UO remains minimal approx 200mL over night. Will assess labs this AM once resulted

## 2021-06-06 NOTE — CONSULTS
INFECTIOUS DISEASE CONSULT/INITIAL HOSPITAL VISIT    Laura Jang  1965  [id number]    Date of Consult: 6/6/2021    Admission Date: 6/1/2021      Requesting Provider: No ref. provider found  Evaluating Physician: Gianni Monahan MD    Reason for Consultation: Clostridium bacteremia    History of present illness:    Patient is a 56 y.o. female, with PMH alcoholism, cirrhosis, seen today for Clostridium bacteremia.  She was brought to the ED after being found down at home, surrounded by beer cans, and fecal incontinence.  She lost her job 4 weeks ago, and she increased her alcohol intake, but several weeks ago, with the help of her therapist, was trying to wean her consumption.  Admitting labs with , proBNP 3736, lAC 9.7, Scr 4.48, , AST 2211, , TBili, 6.7, WBC 28443 PCT 3.3,  UA TNTC WBCS, urine culture now with Ecoli. MRSA PCR positive.   CXR with increased vascular congestion, no overt edema, or pleural effusions. HCT no acute intracranial findings. Abdominal CT with severe hepatic steatosis, liver appears mildly nodular suggesting hepatic cirrhosis, increased attenuation in the mesenteric fat below aortic bifurcation, can be seen in mesenteric panniculitis, hepatic cirrhosis. Blood culture now positive for Clostridium species. She is currently on Zosyn, Rifaximin, and Linezolid.  She is currently confused/somnolent and cannot provide any reliable review of systems.    Past Medical History:   Diagnosis Date   • Alcohol abuse    • Anxiety    • Arthritis    • Blistering of skin 3/1/2020   • Degenerative disc disease, lumbar    • Depression    • Hypertension        Past Surgical History:   Procedure Laterality Date   • APPENDECTOMY     • BARIATRIC SURGERY      2005, gastric sleeve   • CARPAL TUNNEL RELEASE Bilateral     07/2020   • CHOLECYSTECTOMY     • HERNIA REPAIR      2014   • SPINE SURGERY      cyst removal       Family History   Problem Relation Age of Onset   • Arthritis Mother    •  Cancer Mother    • Obesity Mother    • Mental illness Mother    • Hypertension Mother    • Arthritis Sister    • Obesity Sister    • Mental illness Sister    • Hypertension Sister    • Obesity Brother    • Mental illness Brother    • Hypertension Brother    • Cancer Maternal Grandmother    • Breast cancer Neg Hx    • Ovarian cancer Neg Hx        Social History     Socioeconomic History   • Marital status:      Spouse name: Not on file   • Number of children: Not on file   • Years of education: Not on file   • Highest education level: Not on file   Tobacco Use   • Smoking status: Never Smoker   • Smokeless tobacco: Never Used   Substance and Sexual Activity   • Alcohol use: Yes     Alcohol/week: 6.0 standard drinks     Types: 6 Cans of beer per week     Comment: quit 02/18/2020, prior heavy alcohol use for 15y   • Drug use: Not Currently   • Sexual activity: Not Currently     Partners: Male     Birth control/protection: Post-menopausal       No Known Allergies      Medication:    Current Facility-Administered Medications:   •  dextrose (D50W) 25 g/ 50mL Intravenous Solution 25 g, 25 g, Intravenous, Q15 Min PRN, Wilmer Garsia MD, 25 g at 06/02/21 1220  •  famotidine (PEPCID) injection 20 mg, 20 mg, Intravenous, Daily, Candido Story, PharmD, 20 mg at 06/06/21 0905  •  folic acid 1 mg in sodium chloride 0.9 % 50 mL IVPB, 1 mg, Intravenous, Daily, Wilmer Garsia MD, Last Rate: 100 mL/hr at 06/06/21 0904, 1 mg at 06/06/21 0904  •  insulin regular (humuLIN R,novoLIN R) injection 0-7 Units, 0-7 Units, Subcutaneous, Q6H, Wilmer Garsia MD, 2 Units at 06/06/21 1334  •  lactulose (CHRONULAC) 10 GM/15ML solution 10 g, 10 g, Nasogastric, TID, Candido Capps MD, 10 g at 06/06/21 0905  •  Linezolid (ZYVOX) 600 mg 300 mL, 600 mg, Intravenous, Q12H, Candido Capps MD, Last Rate: 300 mL/hr at 06/06/21 0904, 600 mg at 06/06/21 0904  •  midodrine (PROAMATINE) tablet 5 mg, 5 mg,  Nasogastric, TID AC, Candido Capps MD, 5 mg at 06/06/21 1200  •  multivitamin and minerals liquid 15 mL, 15 mL, Nasogastric, Daily, Sofya Calderon, PharmD, 15 mL at 06/06/21 0905  •  norepinephrine (LEVOPHED) 8 mg in 250 mL NS infusion (premix), 0.02-0.3 mcg/kg/min, Intravenous, Titrated, Wilmer Garsia MD, Last Rate: 10.46 mL/hr at 06/06/21 0914, 0.06 mcg/kg/min at 06/06/21 0914  •  Pharmacy Consult, , Does not apply, Continuous PRN, Candido Capps MD  •  piperacillin-tazobactam (ZOSYN) 3.375 g in iso-osmotic dextrose 50 ml (premix), 3.375 g, Intravenous, Q12H, Sofya Calderon, PharmD, 3.375 g at 06/06/21 0612  •  rifAXIMin (XIFAXAN) tablet 400 mg, 400 mg, Nasogastric, Q8H, Candido Capps MD, 400 mg at 06/06/21 1336  •  saccharomyces boulardii (FLORASTOR) capsule 500 mg, 500 mg, Oral, BID, Candido Capps MD, 500 mg at 06/06/21 0904  •  sodium chloride 0.9 % flush 10 mL, 10 mL, Intravenous, Q12H, Raquel Vega, APRN, 10 mL at 06/04/21 2125  •  sodium chloride 0.9 % flush 10 mL, 10 mL, Intravenous, PRN, Raquel Vega, APRN  •  sodium chloride 0.9 % with KCl 20 mEq/L infusion, 75 mL/hr, Intravenous, Continuous, Mikael Vicente MD, Last Rate: 75 mL/hr at 06/05/21 1201, 75 mL/hr at 06/05/21 1201    Antibiotics:  Anti-Infectives (From admission, onward)    Ordered     Dose/Rate Route Frequency Start Stop    06/04/21 1018  Linezolid (ZYVOX) 600 mg 300 mL     Hawa Butler, PharmD reviewed the order on 06/05/21 1119.   Ordering Provider: Candido Capps MD    600 mg  300 mL/hr over 60 Minutes Intravenous Every 12 Hours Scheduled 06/04/21 1115 06/11/21 0859    06/03/21 1925  rifAXIMin (XIFAXAN) tablet 400 mg     Candido Capps MD reviewed the order on 06/05/21 1650.   Ordering Provider: Candido Capps MD    400 mg Nasogastric Every 8 Hours Scheduled 06/03/21 2200 06/08/21 1648    06/02/21 0841  piperacillin-tazobactam (ZOSYN) 3.375 g in iso-osmotic dextrose 50 ml (premix)     Sofya Calderon,  PharmD reviewed the order on 21 1039.   Ordering Provider: Sofya Calderon, PharmD    3.375 g  over 4 Hours Intravenous Every 12 Hours Scheduled 21 1800 21 17521  cefTRIAXone (ROCEPHIN) 1 g/100 mL 0.9% NS (MBP)     Ordering Provider: Yoli Barrera APRN    1 g  over 30 Minutes Intravenous Once 21 2328            Review of Systems:  No reliable ros from patient       Physical Exam:   Vital Signs  Temp (24hrs), Av.7 °F (36.5 °C), Min:97.5 °F (36.4 °C), Max:97.9 °F (36.6 °C)    Temp  Min: 97.5 °F (36.4 °C)  Max: 97.9 °F (36.6 °C)  BP  Min: 89/50  Max: 127/65  Pulse  Min: 90  Max: 101  Resp  Min: 22  Max: 28  SpO2  Min: 74 %  Max: 99 %    GENERAL: drowsy  in no acute distress.  She is jaundiced.  HEENT: Normocephalic, atraumatic.  PERRL. EOMI. No conjunctival injection.she has scleral icterus.  Oropharynx clear without evidence of thrush or exudate. No evidence of peridontal disease.    NECK: Supple without nuchal rigidity.   LYMPH: No cervical, axillary or inguinal lymphadenopathy.  HEART: RRR; 1/2 murmur, rubs, gallops.   LUNGS: Clear to auscultation bilaterally without wheezing, rales, rhonchi. Normal respiratory effort. Nonlabored. No dullness.  ABDOMEN: Obese and distended.positive bowel sounds. No rebound or guarding.   EXT:  No cyanosis, clubbing or edema. No cord.  :  A Nunes catheter is present  MSK:no joint effusions   SKIN: jaundiced .    NEURO: She is somnolent and difficult to arouse.  She is confused.  She moves all of her extremities.      Laboratory Data    Results from last 7 days   Lab Units 21  0342 21  0321 21  0317   WBC 10*3/mm3 25.86* 23.15* 13.29*   HEMOGLOBIN g/dL 10.6* 11.0* 10.6*   HEMATOCRIT % 30.8* 31.6* 31.1*   PLATELETS 10*3/mm3 165 179 162     Results from last 7 days   Lab Units 21  0342   SODIUM mmol/L 134*   POTASSIUM mmol/L 3.1*   CHLORIDE mmol/L 99   CO2 mmol/L 21.0*   BUN mg/dL 57*   CREATININE mg/dL  4.22*   GLUCOSE mg/dL 112*   CALCIUM mg/dL 8.7     Results from last 7 days   Lab Units 06/06/21  0342   ALK PHOS U/L 247*   BILIRUBIN mg/dL 10.6*   BILIRUBIN DIRECT mg/dL 8.6*   ALT (SGPT) U/L 260*   AST (SGOT) U/L 488*     Results from last 7 days   Lab Units 06/03/21  0702   SED RATE mm/hr 5     Results from last 7 days   Lab Units 06/03/21  1806   CRP mg/dL 5.65*     Results from last 7 days   Lab Units 06/06/21  0342   LACTATE mmol/L 2.0     Results from last 7 days   Lab Units 06/01/21  1736   CK TOTAL U/L 668*     Results from last 7 days   Lab Units 06/02/21  0553   VANCOMYCIN RM mcg/mL <4.00*     Estimated Creatinine Clearance: 16.6 mL/min (A) (by C-G formula based on SCr of 4.22 mg/dL (H)).      Microbiology:  Blood Culture   Date Value Ref Range Status   06/01/2021 No growth at 4 days  Preliminary     No results found for: BCIDPCR, CXREFLEX, CSFCX, CULTURETIS  No results found for: CULTURES, HSVCX, URCX  No results found for: EYECULTURE, GCCX, HSVCULTURE, LABHSV  No results found for: LEGIONELLA, MRSACX, MUMPSCX, MYCOPLASCX  No results found for: NOCARDIACX, STOOLCX  Urine Culture   Date Value Ref Range Status   06/01/2021 >100,000 CFU/mL Escherichia coli (A)  Final     No results found for: VIRALCULTU, WOUNDCX        Radiology:  Imaging Results (Last 72 Hours)     Procedure Component Value Units Date/Time    XR Chest 1 View [[id number]] Collected: 06/05/21 0944     Updated: 06/05/21 1554    Narrative:         EXAMINATION: XR CHEST 1 VW - 06/05/2021     INDICATION: N17.9-Acute kidney failure, unspecified;  E87.8-Evdf-pewaqkgeld and hyponatremia; E87.6-Hypokalemia; D64.9-Anemia,  unspecified; F10.20-Alcohol dependence, uncomplicated. Respiratory  failure.     COMPARISON: 06/04/2021     FINDINGS: Portable chest reveals lines and tubes to be in satisfactory  position. Some improvement seen in aeration the left lung base.  Increased markings seen diffusely throughout the lung fields.  Degenerative changes seen  within the spine. Small left pleural effusion.           Impression:      Ill-defined opacification seen at the left lung base with  small left pleural effusion and prominence of the pulmonary vascularity  bilaterally.     DICTATED:   06/05/2021  EDITED/ls :   06/05/2021     This report was finalized on 6/5/2021 3:51 PM by Dr. Leni Downey MD.       XR Chest 1 View [[id number]] Collected: 06/04/21 0827     Updated: 06/04/21 2244    Narrative:      EXAMINATION: XR CHEST 1 VW-06/04/2021:     INDICATION: Respiratory failure; N17.9-Acute kidney failure,  unspecified; E87.1-Sknz-ltwuguwzxm and hyponatremia; E87.6-Hypokalemia;  D64.9-Anemia, unspecified; F10.20-Alcohol dependence, uncomplicated.     COMPARISON: 06/02/2021.     FINDINGS: Feeding tube is seen below the left hemidiaphragm. Left IJ  catheter tip lies in the SVC. The heart is borderline enlarged. The  vasculature appears at upper limits of normal. No pneumothorax is seen.  There is a very small remaining left pleural effusion.       Impression:      Mild pulmonary venous hypertension and minimal left  effusion. No significant new chest disease is seen.     D:  06/04/2021  E:  06/04/2021            This report was finalized on 6/4/2021 10:41 PM by Dr. Rodolfo Rubalcava MD.               Impression:   1.  Septic shock-with hypotension, hypothermia (temperature = 35.5), toxic/metabolic encephalopathy, PCT 3.31, lactic acidosis, acute renal failure, transaminitis, and Clostridium bacteremia.  2.  Ecoli UTI  3.  Clostridium bacteremia-her Clostridium bacteremia is most likely secondary to disruption of bowel mucosal integrity due to her shock.  She appears to have had severe hypovolemic/septic shock and I suspect that she had some secondary mesenteric ischemia.  Some species of Clostridium such as Clostridium septicum can be associated with colorectal cancer or other bowel mucosal defects.  4.  Elevated LFTs  5.  Hyperbilirubinemia  6.  Hyponatremia-severe  7.   Alcoholism, with cirrhosis  8.  Rhabdomyolysis   9.  MRSA colonization  10.  Left lower lobe atelectasis/infiltrate  11.  Hepatic encephalopathy  12.  Acute renal failure/ATN     PLAN/RECOMMENDATIONS:   Thank you for asking us to see Laura Jang, I recommend the followin.  Cdiff PCR- negative  2.  Continue rifaximin  3.  Continue intravenous Zosyn  4.  Continue Zyvox for the time being    Dr. Monahan has obtained the history, performed the physical exam and formulated the above treatment plan.     I discussed her very complex situation with Dr. Candido Capps today.    Gianni Monahan MD  2021  15:44 EDT

## 2021-06-06 NOTE — PLAN OF CARE
Goal Outcome Evaluation:  Plan of Care Reviewed With: sibling  Progress: no change   Remains confused and lethargic. Less active today but still requiring restraints as pulling ekg leads and tube feeds. Can answer some questions such as the year and simple yes and no questions. Continues to just babble at times. Has required O2 today 3-4 liters. Plan for bipap tonight. Lungs remain clear to coarse without wheezing or rhonchi. Abd remains nontender except lower quads. Pt yells when touched. Worse than this morning. Remains in NSR without ectopy. F/C to BSD with dark oleksandr urine only 125ml output. FMS remains with 500ml output. Norepi weaned throughout the day. For renal ultrasound. Continue to monitor.

## 2021-06-06 NOTE — PROGRESS NOTES
"GI Daily Progress Note  Subjective:    Chief Complaint:  Increased diarrhea/confusion     Patient in restraints today.  She nods and answers some questions.  Patient has had increased diarrhea  Patient denies abdominal pain.    Objective:    /57 (BP Location: Left arm, Patient Position: Lying)   Pulse 96   Temp 98.1 °F (36.7 °C) (Oral)   Resp 24   Ht 165.1 cm (65\")   Wt 93 kg (205 lb)   SpO2 96%   BMI 34.11 kg/m²     Physical Exam  Constitutional:       Appearance: She is ill-appearing.      Interventions: She is restrained.   HENT:      Head: Normocephalic.      Comments: Feeding tube in place  Eyes:      General: Gaze aligned appropriately. Scleral icterus present.   Cardiovascular:      Rate and Rhythm: Normal rate and regular rhythm.   Pulmonary:      Breath sounds: Decreased air movement present.   Abdominal:      General: Abdomen is protuberant. Bowel sounds are normal. There is distension.      Palpations: Abdomen is soft.      Tenderness: There is no abdominal tenderness.      Hernia: A hernia is present. Hernia is present in the umbilical area.   Genitourinary:     Comments: Nunes and stool system  Musculoskeletal:      Right lower leg: Edema present.      Left lower leg: Edema present.         Lab  Lab Results   Component Value Date    WBC 25.86 (H) 06/06/2021    HGB 10.6 (L) 06/06/2021    HGB 11.0 (L) 06/05/2021    HGB 10.6 (L) 06/04/2021    MCV 97.8 (H) 06/06/2021     06/06/2021    INR 1.79 (H) 06/05/2021    INR 3.34 (H) 06/03/2021    INR 3.68 (H) 06/02/2021    INR 2.82 (H) 06/01/2021       Lab Results   Component Value Date    GLUCOSE 112 (H) 06/06/2021    BUN 57 (H) 06/06/2021    CREATININE 4.22 (H) 06/06/2021    EGFRIFNONA 11 (L) 06/06/2021    EGFRIFAFRI  06/06/2021      Comment:      <15 Indicative of kidney failure.    BCR 13.5 06/06/2021     (L) 06/06/2021    K 3.1 (L) 06/06/2021    CO2 21.0 (L) 06/06/2021    CALCIUM 8.7 06/06/2021    ALBUMIN 2.70 (L) 06/06/2021    ALKPHOS " 247 (H) 06/06/2021    BILITOT 10.6 (H) 06/06/2021    BILIDIR 8.6 (H) 06/06/2021     (H) 06/06/2021     (H) 06/06/2021       Assessment:      Sepsis and septic shock due to Gram negative bacteria.  Urinary tract origin (CMS/HCC)    Alcoholism (CMS/HCC)    Moderate episode of recurrent major depressive disorder (CMS/HCC)    Lactic acidosis resolved    Hyponatremia resolving    Hypokalemia    Elevated bilirubin    Leukocytosis    Severe anemia due to GI losses (source unknown)    E. coli UTI (urinary tract infection)    Acute renal failure (CMS/HCC)    Shock liver improved    Hypophosphatemia    Mild to moderate mitral stenosis on echocardiogram    Alcoholic cirrhosis (CMS/HCC)  1.  Transaminitis, improving  2.  BRITO cirrhosis              -Meld-Na: 36 points.               -Child Carrera: C  3.  Alcohol abuse  4.  Hyponatremia in setting of cirrhosis  5.  Acute kidney injury, creatinine 4.31-GFR 11  6. Metabolic acidosis  7. Status post sleeve gastrectomy, cholecystectomy  8. Coagulopathy  9. Malnutrition      Plan:    Discussed case with Dr. Capps,  Agree that patient does not need daily ammonia level checked.  Patient with increased diarrhea and may hold lactulose if excessive. Patient following some commands and trying to answer questions    Patient should still be clinically evaluated for encephalopathy and treated if possible.  Noted ID just saw patient and diagnosed patient with Clostridium septicum.  Appreciate their help.  Patient C dif PCR negative.  I do not recommend colonoscopy at this point.      Continue nutrition, fluids, and pressors if needed for her sepsis    Noted patient had abdominal distention will order portable ultrasound (non urgent) to check for ascites          Sofya Carlisle MD  06/06/21  16:21 EDT

## 2021-06-07 NOTE — PLAN OF CARE
Goal Outcome Evaluation:  Plan of Care Reviewed With: patient           Outcome Summary: PT initial evaluation completed for pt presenting with generalized weakness, impaired cognition, and decreased functional mobility. Pt required maxA x2 for supine to sit and to return to supine. All OOB activities deferred this date. Pt's decreased independence warrants PT skilled care. Recommend D/C to SNF.

## 2021-06-07 NOTE — CONSULTS
Renetta Santizo MD  Consulting physician: Meño Strong  Reason for referral: goc discussion, ACP  Chief Complaint   Patient presents with   • Weakness - Generalized   • Hypotension   • Hypoglycemia   • Alcohol Problem       HPI:   57 yo female with history of alcohol use disorder, anxiety/depression, DDD, obesity (s/p gastric sleeve and lap band) and HTN presented to [facility] on 6/1/2021 after being found down incontinent and unresponsive in her apartment. EMS found hypoglycemic in 30s and hypotensive with systolic in 80s. Patient found to have sepsis with E Coli UTI, severe hyponatremia, metabolic acidosis, acute renal failure, anemia and acute liver injury.   Despite medical management patient has continued to have decline in clinical status with worsening renal function, diminished urine output, requiring pressors.     Symptoms:   Patient able to reply to direct questions, replies to having discomfort all over, shared that she is not used to lying down all the time. No dyspnea, anxiety.  + nausea.  Limited with further questions or able to describe symptoms further.   + diarrhea, requiring fecal management system for skin erythema   Advance care planning discussed:   Code Status:   Current Code Status     Date Active Code Status Order ID Comments User Context       6/4/2021 1238 No CPR [id number]  Candido Capps MD Inpatient     Advance Care Planning Activity      Questions for Current Code Status     Question Answer Comment    Code Status No CPR     Medical Interventions (Level of Support Prior to Arrest) Limited     Limited Support to NOT Include Intubation      Cardioversion/Defibrillation     Level Of Support Discussed With Health Care Surrogate      Next of Kin (If No Surrogate)         Advance Directive: reviewed on medical record  Surrogate decision maker: Estranged , Robbie, has deferred to patient's sister, Davin Tejada.    Per report patient and  are legally  with two children:  25 yo and 24 yo.  All live in [place]  Past Medical History:   Diagnosis Date   • Alcohol abuse    • Anxiety    • Arthritis    • Blistering of skin 3/1/2020   • Degenerative disc disease, lumbar    • Depression    • Hypertension      Past Surgical History:   Procedure Laterality Date   • APPENDECTOMY     • BARIATRIC SURGERY      2005, gastric sleeve   • CARPAL TUNNEL RELEASE Bilateral     07/2020   • CHOLECYSTECTOMY     • HERNIA REPAIR      2014   • SPINE SURGERY      cyst removal       Reviewed current scheduled and prn medications for route, type, dose and frequency.    Current Facility-Administered Medications   Medication Dose Route Frequency Provider Last Rate Last Admin   • albumin human 25 % IV SOLN 12.5 g  12.5 g Intravenous PRN Michael English MD       • dextrose (D50W) 25 g/ 50mL Intravenous Solution 25 g  25 g Intravenous Q15 Min PRN Wilmer Garsia MD   25 g at 06/02/21 1220   • famotidine (PEPCID) tablet 20 mg  20 mg Nasogastric Daily Candido Capps MD   20 mg at 06/07/21 0806   • folic acid 1 mg in sodium chloride 0.9 % 50 mL IVPB  1 mg Intravenous Daily Wilmer Garsia  mL/hr at 06/07/21 0806 1 mg at 06/07/21 0806   • heparin (porcine) 1000 UNIT/ML injection  - ADS Override Pull            • heparin (porcine) infusion  - ADS Override Pull            • insulin regular (humuLIN R,novoLIN R) injection 0-7 Units  0-7 Units Subcutaneous Q6H Wilmer Garsia MD   2 Units at 06/06/21 1334   • lactulose (CHRONULAC) 10 GM/15ML solution 10 g  10 g Nasogastric TID Candido Capps MD   10 g at 06/07/21 0806   • Linezolid (ZYVOX) 600 mg 300 mL  600 mg Intravenous Q12H Candido Capps  mL/hr at 06/07/21 0806 600 mg at 06/07/21 0806   • midodrine (PROAMATINE) tablet 5 mg  5 mg Nasogastric TID AC Candido Capps MD   5 mg at 06/07/21 1040   • multivitamin and minerals liquid 15 mL  15 mL Nasogastric Daily Sofya Calderon, PharmD   15 mL at  06/07/21 0806   • norepinephrine (LEVOPHED) 8 mg in 250 mL NS infusion (premix)  0.02-0.3 mcg/kg/min Intravenous Titrated Wilmer Garsia MD 6.98 mL/hr at 06/07/21 0000 0.04 mcg/kg/min at 06/07/21 0000   • piperacillin-tazobactam (ZOSYN) 3.375 g in iso-osmotic dextrose 50 ml (premix)  3.375 g Intravenous Q12H Sofya Calderon PharmD   3.375 g at 06/07/21 0629   • rifAXIMin (XIFAXAN) tablet 400 mg  400 mg Nasogastric Q8H Candido Capps MD   400 mg at 06/07/21 0630   • saccharomyces boulardii (FLORASTOR) capsule 500 mg  500 mg Oral BID Candido Capps MD   500 mg at 06/07/21 0806   • sodium chloride 0.9 % flush 10 mL  10 mL Intravenous Q12H Raquel Vega APRN   10 mL at 06/06/21 2157   • sodium chloride 0.9 % flush 10 mL  10 mL Intravenous PRN Raquel Vega APRN       • sodium chloride 0.9 % with KCl 20 mEq/L infusion  50 mL/hr Intravenous Continuous TamekaMichael granda MD 50 mL/hr at 06/07/21 0955 50 mL/hr at 06/07/21 0955     norepinephrine, 0.02-0.3 mcg/kg/min, Last Rate: 0.04 mcg/kg/min (06/07/21 0000)  sodium chloride 0.9 % with KCl 20 mEq, 50 mL/hr, Last Rate: 50 mL/hr (06/07/21 0955)      •  albumin human  •  dextrose  •  sodium chloride  No Known Allergies  Family History   Problem Relation Age of Onset   • Arthritis Mother    • Cancer Mother    • Obesity Mother    • Mental illness Mother    • Hypertension Mother    • Arthritis Sister    • Obesity Sister    • Mental illness Sister    • Hypertension Sister    • Obesity Brother    • Mental illness Brother    • Hypertension Brother    • Cancer Maternal Grandmother    • Breast cancer Neg Hx    • Ovarian cancer Neg Hx      Social History     Socioeconomic History   • Marital status:      Spouse name: Not on file   • Number of children: Not on file   • Years of education: Not on file   • Highest education level: Not on file   Tobacco Use   • Smoking status: Never Smoker   • Smokeless tobacco: Never Used   Substance and Sexual Activity   •  "Alcohol use: Yes     Alcohol/week: 6.0 standard drinks     Types: 6 Cans of beer per week     Comment: quit 02/18/2020, prior heavy alcohol use for 15y   • Drug use: Not Currently   • Sexual activity: Not Currently     Partners: Male     Birth control/protection: Post-menopausal       Review of Systems - +/- per HPI and symptom review.    PPS: 20%  /49   Pulse 84   Temp 96.8 °F (36 °C) (Bladder)   Resp 22   Ht 165.1 cm (65\")   Wt 93 kg (205 lb)   SpO2 98%   BMI 34.11 kg/m²   93 kg (205 lb) Body mass index is 34.11 kg/m².  Intake & Output (last day)       06/06 0701 - 06/07 0700 06/07 0701 - 06/08 0700    I.V. (mL/kg) 1665 (17.9)     Other      NG/     IV Piggyback      Total Intake(mL/kg) 2375 (25.5)     Urine (mL/kg/hr) 215 (0.1)     Stool 800     Total Output 1015     Net +1360           Stool Unmeasured Occurrence 6 x         Physical Exam:  General Appearance: No acute distress, obese, ill appearing  Head: Normocephalic without obvious abnormality, atraumatic  Eyes: Conjunctivae moist, sclerae icterus, JAMES  Throat: No oral lesions, no thrush, oral mucosa moist  Neck: Left IJ  Lungs: Clear to auscultation, respirations regular, even and non-labored  Heart: Regular rhythm and normal rate, normal S1  Abdomen: bowel sounds hypoactive, non-tender, ascites  Extremities: Moves all extremities, no redness, no cyanosis, upper extremity edema  Skin: Warm and dry  Neurological: awake, opens eyes to name, replies to direct questions, no myoclonus, equal hand  and strength  Reviewed labs and diagnostic results.  Lab Results   Component Value Date    HGBA1C 4.6 (L) 06/01/2021     Results from last 7 days   Lab Units 06/07/21  0257   WBC 10*3/mm3 28.32*   HEMOGLOBIN g/dL 11.0*   HEMATOCRIT % 32.3*   PLATELETS 10*3/mm3 143     Results from last 7 days   Lab Units 06/07/21  0257   SODIUM mmol/L 135*   POTASSIUM mmol/L 3.8   CHLORIDE mmol/L 101   CO2 mmol/L 20.0*   BUN mg/dL 74*   CREATININE mg/dL 4.97* "   CALCIUM mg/dL 8.7   BILIRUBIN mg/dL 10.4*   ALK PHOS U/L 247*   ALT (SGPT) U/L 208*   AST (SGOT) U/L 368*   GLUCOSE mg/dL 93     Results from last 7 days   Lab Units 06/07/21  0257   SODIUM mmol/L 135*   POTASSIUM mmol/L 3.8   CHLORIDE mmol/L 101   CO2 mmol/L 20.0*   BUN mg/dL 74*   CREATININE mg/dL 4.97*   GLUCOSE mg/dL 93   CALCIUM mg/dL 8.7     Imaging Results (Last 72 Hours)     Procedure Component Value Units Date/Time    US Liver [[id number]] Collected: 06/07/21 1118     Updated: 06/07/21 1123    Narrative:      EXAMINATION: US LIVER-     INDICATION: Please evaluate for ascites (four quadrants); alcoholic  hepatitis; please also evaluate entire liver for any lesions;  N17.9-Acute kidney failure, unspecified; E87.9-Kktr-sibwohynyl and  hyponatremia; E87.6-Hypokalemia; D64.9-Anemia, unspecified;  F10.20-Alcohol dependence, uncomplicated.      TECHNIQUE: Ultrasound right upper quadrant abdomen and liver.     COMPARISON: None.     FINDINGS: Visualized portions of the pancreas is within normal limits.     Liver demonstrates coarsened echotexture with nodular contours of  cirrhotic hepatic morphology with adjacent ascites in all four quadrants  noted on limiting.     Gallbladder is surgically absent.     Common bile duct 7 mm in diameter.     Right kidney 10.2 cm in length without evidence of hydronephrosis,  contour deforming mass or obvious calculi.       Impression:      Cirrhotic hepatic morphology with ascites in all four  quadrants.     D:  06/07/2021  E:  06/07/2021           XR Chest 1 View [[id number]] Collected: 06/07/21 0830     Updated: 06/07/21 0910    Narrative:      EXAMINATION: XR CHEST 1 VW-      INDICATION: Respiratory failure; N17.9-Acute kidney failure,  unspecified; E87.5-Widj-wcnewdloyz and hyponatremia; E87.6-Hypokalemia;  D64.9-Anemia, unspecified; F10.20-Alcohol dependence, uncomplicated.      COMPARISON: 06/05/2021.     FINDINGS: Portable chest reveals heart to be borderline enlarged  with  prominence of the pulmonary vascularity. Mild elevation identified of  the right hemidiaphragm. Degenerative change is seen within the spine.  No new focal parenchymal opacification is present.           Impression:      Prominence of the pulmonary vascularity. The heart is  enlarged. Lines and tubes in satisfactory position. Small left pleural  effusion.     D:  06/07/2021  E:  06/07/2021       XR Chest 1 View [[id number]] Collected: 06/05/21 0944     Updated: 06/05/21 1554    Narrative:         EXAMINATION: XR CHEST 1 VW - 06/05/2021     INDICATION: N17.9-Acute kidney failure, unspecified;  E87.6-Vksp-amzguobild and hyponatremia; E87.6-Hypokalemia; D64.9-Anemia,  unspecified; F10.20-Alcohol dependence, uncomplicated. Respiratory  failure.     COMPARISON: 06/04/2021     FINDINGS: Portable chest reveals lines and tubes to be in satisfactory  position. Some improvement seen in aeration the left lung base.  Increased markings seen diffusely throughout the lung fields.  Degenerative changes seen within the spine. Small left pleural effusion.           Impression:      Ill-defined opacification seen at the left lung base with  small left pleural effusion and prominence of the pulmonary vascularity  bilaterally.     DICTATED:   06/05/2021  EDITED/ls :   06/05/2021     This report was finalized on 6/5/2021 3:51 PM by Dr. Leni Downey MD.       XR Chest 1 View [[id number]] Collected: 06/04/21 0827     Updated: 06/04/21 2244    Narrative:      EXAMINATION: XR CHEST 1 VW-06/04/2021:     INDICATION: Respiratory failure; N17.9-Acute kidney failure,  unspecified; E87.6-Vszm-zhdpeaqteo and hyponatremia; E87.6-Hypokalemia;  D64.9-Anemia, unspecified; F10.20-Alcohol dependence, uncomplicated.     COMPARISON: 06/02/2021.     FINDINGS: Feeding tube is seen below the left hemidiaphragm. Left IJ  catheter tip lies in the SVC. The heart is borderline enlarged. The  vasculature appears at upper limits of normal. No  pneumothorax is seen.  There is a very small remaining left pleural effusion.       Impression:      Mild pulmonary venous hypertension and minimal left  effusion. No significant new chest disease is seen.     D:  06/04/2021  E:  06/04/2021            This report was finalized on 6/4/2021 10:41 PM by Dr. Rodolfo Rubalcava MD.          ACTIVE problem list:  Sepsis and septic shock due to Gram negative bacteria.  Urinary tract origin (CMS/HCC)    E. coli UTI (urinary tract infection)    Acute renal failure (CMS/HCC)    Shock liver improved    Alcoholic cirrhosis (CMS/HCC)    Lactic acidosis resolved    Hyponatremia resolving    Hypokalemia    Elevated bilirubin    Hypophosphatemia    Severe anemia due to GI losses (source unknown)    Alcoholism (CMS/HCC)    Moderate episode of recurrent major depressive disorder (CMS/HCC)    Leukocytosis    Mild to moderate mitral stenosis on echocardiogram    Impression: 56 y.o. female with sepsis - UTI E Coli, BRITO with MELD 36,  Acute renal failure - decline hemodialysis    Plan: Transitioning plan of care to [facility]    Nausea - prn ondansetron, keofeed removed and enteral nutrition stopped     Dyspnea - prn opioid and lorazepam if has increase work of breathing, acutely decompensates    Goals of care - spoke to sister, Davin Tejada, over the phone and at the bedside. Patient's  deferred to Davin for healthcare decisions. The patient and her  have not lived together for several years.   Sister aware of patient's living will and knows patient would not want to continue life prolonging interventions (artificial nutrition, dialysis, antibiotics, vasopressor, NIPPV support) with a worsening debilitated state, diminished meaningful recovery with persistent encephalopathy and being socially isolated.   Patient's sister has been in regular communication with patient's  and children.  Patient's son is planning to fly from [place] overnight and be here on 6/8 am.   Plan is to  continue vasopressor support for overnight with current IV medications until son arrives in am. Then stop those interventions and transition to inpatient hospice care. Agreed to stop keofeed and remove tube today - no further medications (Rifaximin and Lactulose) administered for encephalopathy.   Reviewed and updated nursing and DAVIAN Johnson with ICU service.     DAVIAN Beaulieu  [phone]  06/07/21  11:48 EDT      Time: 60 minutes spent reviewing medical and medication records, assessing and examining patient, discussing with patient, family and nursing staff, answering questions, formulating a plan and documentation of care. > 50% time spent face to face

## 2021-06-07 NOTE — PROGRESS NOTES
"   LOS: 6 days    Patient Care Team:  Renetta Santizo MD as PCP - General (Internal Medicine)    Reason For Visit:  F/U ABDI  Subjective           Review of Systems: UNOBTAINABLE.      Objective     famotidine, 20 mg, Nasogastric, Daily  folic acid (FOLVITE) IVPB, 1 mg, Intravenous, Daily  insulin regular, 0-7 Units, Subcutaneous, Q6H  lactulose, 10 g, Nasogastric, TID  Linezolid, 600 mg, Intravenous, Q12H  midodrine, 5 mg, Nasogastric, TID AC  multivitamin and minerals, 15 mL, Nasogastric, Daily  piperacillin-tazobactam, 3.375 g, Intravenous, Q12H  rifAXIMin, 400 mg, Nasogastric, Q8H  saccharomyces boulardii, 500 mg, Oral, BID  sodium chloride, 10 mL, Intravenous, Q12H      norepinephrine, 0.02-0.3 mcg/kg/min, Last Rate: 0.04 mcg/kg/min (06/07/21 0000)  sodium chloride 0.9 % with KCl 20 mEq, 50 mL/hr, Last Rate: 75 mL/hr (06/05/21 1201)          Vital Signs:  Blood pressure 108/66, pulse 83, temperature 96.8 °F (36 °C), temperature source Bladder, resp. rate 22, height 165.1 cm (65\"), weight 93 kg (205 lb), SpO2 100 %, not currently breastfeeding.    Flowsheet Rows      First Filed Value   Admission Height  165.1 cm (65\") Documented at 06/01/2021 1730   Admission Weight  93 kg (205 lb) Documented at 06/01/2021 1730          06/06 0701 - 06/07 0700  In: 2375 [I.V.:1665]  Out: 1015 [Urine:215]    Physical Exam:    General Appearance: NAD, alert. CONFUSED  Eyes: PER, conjunctivae and sclerae normal, no icterus  Lungs: FEW RHONCHI  Heart/CV: regular rhythm & normal rate, no murmur, no gallop, no rub and TR DEPENDENT edema  Abdomen: not distended, soft, non-tender, no masses,  bowel sounds present  Skin: No rash, Warm and dry.  KITA.    Radiology:            Labs:  Results from last 7 days   Lab Units 06/07/21  0257 06/06/21  0342 06/05/21  0321   WBC 10*3/mm3 28.32* 25.86* 23.15*   HEMOGLOBIN g/dL 11.0* 10.6* 11.0*   HEMATOCRIT % 32.3* 30.8* 31.6*   PLATELETS 10*3/mm3 143 165 179     Results from last 7 days "   Lab Units 06/07/21  0257 06/06/21  0342 06/05/21  0321 06/04/21  1755 06/04/21  0317 06/03/21  0827   SODIUM mmol/L 135* 134* 133* 129* 125* 121*  122*   POTASSIUM mmol/L 3.8 3.1* 2.9* 3.3* 2.9* 3.5   CHLORIDE mmol/L 101 99 95* 92* 88* 82*   CO2 mmol/L 20.0* 21.0* 22.0 21.0* 22.0 19.0*   BUN mg/dL 74* 57* 39* 34* 28* 24*   CREATININE mg/dL 4.97* 4.22* 4.31* 4.31* 4.69* 4.92*   CALCIUM mg/dL 8.7 8.7 8.9 9.2 9.2 8.8   PHOSPHORUS mg/dL 3.4 2.0* 1.6* 0.6* 0.5* 1.6*   MAGNESIUM mg/dL 2.0  --  1.9  --  2.3 2.4   ALBUMIN g/dL 2.70* 2.70* 2.90* 3.10* 3.30* 3.60     Results from last 7 days   Lab Units 06/07/21  0257   GLUCOSE mg/dL 93       Results from last 7 days   Lab Units 06/07/21  0257 06/06/21  0342   ALK PHOS U/L 247* 247*   BILIRUBIN mg/dL 10.4* 10.6*   BILIRUBIN DIRECT mg/dL  --  8.6*   ALT (SGPT) U/L 208* 260*   AST (SGOT) U/L 368* 488*     Results from last 7 days   Lab Units 06/05/21  0312   PH, ARTERIAL pH units 7.447   PO2 ART mm Hg 68.5*   PCO2, ARTERIAL mm Hg 32.1*   HCO3 ART mmol/L 22.1       Results from last 7 days   Lab Units 06/01/21  1933   COLOR UA  [place]*   CLARITY UA  [place]urb[place]*   PH, URINE  5.5   SPECIFIC GRAVITY, URINE  1.013   GLUCOSE UA  Negative   KETONES UA  Trace*   BILIRUBIN UA  Small (1+)*   PROTEIN UA  30 mg/dL (1+)*   BLOOD UA  Small (1+)*   LEUKOCYTES UA  Large (3+)*   NITRITE UA  Positive*       Estimated Creatinine Clearance: 14.1 mL/min (A) (by C-G formula based on SCr of 4.97 mg/dL (H)).      Assessment       Sepsis and septic shock due to Gram negative bacteria.  Urinary tract origin (CMS/HCC)    Alcoholism (CMS/HCC)    Moderate episode of recurrent major depressive disorder (CMS/HCC)    Lactic acidosis resolved    Hyponatremia resolving    Hypokalemia    Elevated bilirubin    Leukocytosis    Severe anemia due to GI losses (source unknown)    E. coli UTI (urinary tract infection)    Acute renal failure (CMS/HCC)    Shock liver improved    Hypophosphatemia    Mild to moderate  mitral stenosis on echocardiogram    Alcoholic cirrhosis (CMS/HCC)            Impression: OLIGURIC ABDI WITH WORSE GFR. ANEMIA. HYPOKALEMIA AND HYPOPHOSPHATEMIA ARE BETTER. HYPOTENSION ON LOW DOSE PRESSORS            Recommendations: START DIALYSIS. HAVE IR PLACE A THD CATH.      Michael English MD  06/07/21  09:28 EDT

## 2021-06-07 NOTE — THERAPY EVALUATION
Patient Name: Laura Jang  : 1965    MRN: [id number]                              Today's Date: 2021       Admit Date: 2021    Visit Dx:     ICD-10-CM ICD-9-CM   1. Acute renal failure, unspecified acute renal failure type (CMS/HCC)  N17.9 584.9   2. Hyponatremia  E87.1 276.1   3. Hypokalemia  E87.6 276.8   4. Anemia, unspecified type  D64.9 285.9   5. Alcoholism (CMS/HCC)  F10.20 303.90     Patient Active Problem List   Diagnosis   • Alcoholism (CMS/[facility])   • Bilateral hearing loss   • Chronic bilateral low back pain   • DDD (degenerative disc disease), cervical   • Neuropathy   • Anxiety   • Moderate episode of recurrent major depressive disorder (CMS/[facility])   • Alopecia   • History of iron deficiency   • History of vitamin D deficiency   • Essential hypertension   • Bilateral carpal tunnel syndrome   • Lactic acidosis resolved   • Hyponatremia resolving   • Hypokalemia   • Hypochloremia   • ABDI (acute kidney injury) (CMS/[facility])   • Elevated lipase   • Hypoproteinemia (CMS/HCC)   • Elevated bilirubin   • Leukocytosis   • Severe anemia due to GI losses (source unknown)   • E. coli UTI (urinary tract infection)   • Acute renal failure (CMS/[facility])   • Sepsis and septic shock due to Gram negative bacteria.  Urinary tract origin (CMS/HCC)   • Shock liver improved   • Hypophosphatemia   • Mild to moderate mitral stenosis on echocardiogram   • Alcoholic cirrhosis (CMS/[facility])     Past Medical History:   Diagnosis Date   • Alcohol abuse    • Anxiety    • Arthritis    • Blistering of skin 3/1/2020   • Degenerative disc disease, lumbar    • Depression    • Hypertension      Past Surgical History:   Procedure Laterality Date   • APPENDECTOMY     • BARIATRIC SURGERY      , gastric sleeve   • CARPAL TUNNEL RELEASE Bilateral     2020   • CHOLECYSTECTOMY     • HERNIA REPAIR         • SPINE SURGERY      cyst removal     General Information     Row Name 21 1427          OT Time and Intention    Document Type   evaluation limitted to bed level per nsg, Pt in BUE soft restraints  -CS     Mode of Treatment  occupational therapy  -CS     Row Name 06/07/21 1427          General Information    Patient Profile Reviewed  yes  -CS     Prior Level of Function  independent:;all household mobility;ADL's Pt poor historian on this date. Per CM notes: Pt lives alone in [place] and was independent w/ ADL completion prior to recent decline. Unknown if Pt used AD or O2 at baseline. No family nearby.  -CS     Existing Precautions/Restrictions  fall;other (see comments) NATHANIEL Corey, Manuj  -CS     Barriers to Rehab  medically complex;ineffective coping  -CS     Row Name 06/07/21 1427          Living Environment    Lives With  alone  -CS     Row Name 06/07/21 1427          Home Main Entrance    Number of Stairs, Main Entrance  (S) -- Pt poor historian on this date, Social/Home hx requires further assessment  -CS     Row Name 06/07/21 1427          Cognition    Orientation Status (Cognition)  unable/difficult to assess Pt mostly non-verbal w/ limited responses, turned head w/ name and nodded yes/no to simple questions  -CS     Row Name 06/07/21 1427          Safety Issues, Functional Mobility    Safety Issues Affecting Function (Mobility)  ability to follow commands;awareness of need for assistance;insight into deficits/self-awareness;safety precaution awareness;safety precautions follow-through/compliance;sequencing abilities  -CS     Impairments Affecting Function (Mobility)  balance;cognition;endurance/activity tolerance;strength;grasp  -CS     Cognitive Impairments, Mobility Safety/Performance  awareness, need for assistance;insight into deficits/self-awareness;safety precaution awareness;safety precaution follow-through;sequencing abilities  -CS       User Key  (r) = Recorded By, (t) = Taken By, (c) = Cosigned By    Initials Name Provider Type    CS Serafin Sierra, OT Occupational Therapist          Mobility/ADL's     Row Name  06/07/21 1434          Bed Mobility    Bed Mobility  rolling left;rolling right;scooting/bridging  -CS     Rolling Left [place] (Bed Mobility)  dependent (less than 25% patient effort);2 person assist;verbal cues;nonverbal cues (demo/gesture)  -CS     Rolling Right [place] (Bed Mobility)  dependent (less than 25% patient effort);2 person assist;verbal cues;nonverbal cues (demo/gesture)  -CS     Scooting/Bridging [place] (Bed Mobility)  dependent (less than 25% patient effort);moderate assist (50% patient effort);verbal cues;nonverbal cues (demo/gesture)  -CS     Assistive Device (Bed Mobility)  draw sheet;bed rails  -CS     Comment (Bed Mobility)  Bed mobility performed for hygiene and bed linen change, Pt required verbal/tactile cues for sequencing and UE reach for bed rails, Pt able to assist w/ maintaining sidelying w/ tactile cues for bed rail grasp  -     Row Name 06/07/21 1434          Transfers    Comment (Transfers)  EOB/OOB activities deferred per nsg on this date  -     Row Name 06/07/21 1434          Functional Mobility    Functional Mobility- Comment  EOB/OOB activities deferred per nsg on this date  -     Row Name 06/07/21 1434          Activities of Daily Living    BADL Assessment/Intervention  grooming;feeding;toileting  -     Row Name 06/07/21 1434          Grooming Assessment/Training    [place] Level (Grooming)  hair care, combing/brushing;wash face, hands;dependent (less than 25% patient effort)  -CS     Assistive Devices (Grooming)  hand over hand  -CS     Position (Grooming)  supine  -CS     Comment (Grooming)  Therapist attempted [place] assist, cognitive and strength deficits limit Pt participation  -     Row Name 06/07/21 1434          Self-Feeding Assessment/Training    [place] Level (Feeding)  not tested;unable to assess  -CS     Comment (Feeding)  Pt remains on tube feeding, requires further assessment  -     Row Name 06/07/21 1434          Toileting  Assessment/Training    [place] Level (Toileting)  adjust/manage clothing;perform perineal hygiene;dependent (less than 25% patient effort)  -CS     Position (Toileting)  supine  -CS     Comment (Toileting)  anais-care performed around FMS site  -       User Key  (r) = Recorded By, (t) = Taken By, (c) = Cosigned By    Initials Name Provider Type     Serafin Sierra, OT Occupational Therapist        Obj/Interventions     Row Name 06/07/21 1438          Sensory Assessment (Somatosensory)    Sensory Assessment (Somatosensory)  unable/difficult to assess  -     Row Name 06/07/21 1438          Vision Assessment/Intervention    Visual Impairment/Limitations  unable/difficult to assess  -     Row Name 06/07/21 1438          Range of Motion Comprehensive    General Range of Motion  bilateral upper extremity ROM WFL  -CS     Comment, General Range of Motion  BUE PROM grossly WFL  -     Row Name 06/07/21 1438          Strength Comprehensive (MMT)    General Manual Muscle Testing (MMT) Assessment  upper extremity strength deficits identified  -CS     Comment, General Manual Muscle Testing (MMT) Assessment  Formal MMT deferred d/t limitted command following, B grasp grossly 3+/5, B elbow flexion 3/5, B shoulder flexion 3/5  -     Row Name 06/07/21 1438          Shoulder (Therapeutic Exercise)    Shoulder (Therapeutic Exercise)  AAROM (active assistive range of motion)  -     Shoulder AAROM (Therapeutic Exercise)  bilateral;flexion;extension;aBduction;aDduction;supine;10 repetitions  -     Row Name 06/07/21 1438          Elbow/Forearm (Therapeutic Exercise)    Elbow/Forearm (Therapeutic Exercise)  AAROM (active assistive range of motion)  -     Elbow/Forearm AAROM (Therapeutic Exercise)  bilateral;flexion;extension;supination;pronation;supine;10 repetitions  -     Row Name 06/07/21 1438          Balance    Balance Assessment  sitting static balance;sitting dynamic balance;standing static balance;standing  dynamic balance  -CS     Static Sitting Balance  not tested  -CS     Dynamic Sitting Balance  not tested  -CS     Static Standing Balance  not tested  -CS     Dynamic Standing Balance  not tested  -     Row Name 06/07/21 1438          Therapeutic Exercise    Therapeutic Exercise  shoulder;elbow/forearm  -       User Key  (r) = Recorded By, (t) = Taken By, (c) = Cosigned By    Initials Name Provider Type     Serafin Sierra, OT Occupational Therapist        Goals/Plan     Row Name 06/07/21 1447          Bed Mobility Goal 1 (OT)    Activity/Assistive Device (Bed Mobility Goal 1, OT)  rolling to left;rolling to right;sit to supine/supine to sit  -CS     [place] Level/Cues Needed (Bed Mobility Goal 1, OT)  moderate assist (50-74% patient effort)  -CS     Time Frame (Bed Mobility Goal 1, OT)  long term goal (LTG);10 days  -CS     Progress/Outcomes (Bed Mobility Goal 1, OT)  goal ongoing  -     Row Name 06/07/21 1447          Bathing Goal 1 (OT)    Activity/Device (Bathing Goal 1, OT)  upper body bathing  -CS     Boundary Level/Cues Needed (Bathing Goal 1, OT)  moderate assist (50-74% patient effort)  -CS     Time Frame (Bathing Goal 1, OT)  long term goal (LTG);10 days  -CS     Progress/Outcomes (Bathing Goal 1, OT)  goal ongoing  -     Row Name 06/07/21 1447          Grooming Goal 1 (OT)    Activity/Device (Grooming Goal 1, OT)  hair care;wash face, hands  -CS     Boundary (Grooming Goal 1, OT)  minimum assist (75% or more patient effort)  -CS     Time Frame (Grooming Goal 1, OT)  long term goal (LTG);10 days  -CS     Progress/Outcome (Grooming Goal 1, OT)  goal ongoing  -     Row Name 06/07/21 1447          Strength Goal 1 (OT)    Strength Goal 1 (OT)  Pt will tolerate 1/10reps BUE AROM HEP by discharge w/ Min cues to promote fxl strength for ADL completion  -     Row Name 06/07/21 1447          Therapy Assessment/Plan (OT)    Planned Therapy Interventions (OT)  activity tolerance  training;functional balance retraining;occupation/activity based interventions;strengthening exercise;transfer/mobility retraining;BADL retraining;adaptive equipment training  -CS       User Key  (r) = Recorded By, (t) = Taken By, (c) = Cosigned By    Initials Name Provider Type    CS Serafin Sierra, OT Occupational Therapist        Clinical Impression     Row Name 06/07/21 1439          Pain Assessment    Additional Documentation  Pain Scale: FACES Pre/Post-Treatment (Group)  -CS     Row Name 06/07/21 1439          Pain Scale: FACES Pre/Post-Treatment    Pain: FACES Scale, Pretreatment  0-->no hurt  -CS     Posttreatment Pain Rating  0-->no hurt  -CS     Pre/Posttreatment Pain Comment  Pt shook head no when asked about pain, tolerated eval  -CS     Row Name 06/07/21 1430          Plan of Care Review    Plan of Care Reviewed With  patient  -CS     Progress  no change  -CS     Outcome Summary  Initial OT evaluation completed, limitted to bed level d/t cognitive status and restraints. Pt presents w/ significant generalized weakness, impaired cognition and balance, and decreased activity tolerance/endurance warranting skilled OT services to promote return to PLOF. Therapist assisted nursing w/ bed mobility for hygiene and linen change, Pt was dependent for all bed mobility, toileting, grooming, and feeding (tube). Pt tolerated BUE AAROM HEP (1/10reps) in supine. Recommend d/c to SNF vs. LTACH pending medical needs.  -CS     Row Name 06/07/21 1432          Therapy Assessment/Plan (OT)    Patient/Family Therapy Goal Statement (OT)  unable to state  -CS     Rehab Potential (OT)  fair, will monitor progress closely  -CS     Criteria for Skilled Therapeutic Interventions Met (OT)  yes;meets criteria;skilled treatment is necessary  -CS     Therapy Frequency (OT)  daily  -CS     Row Name 06/07/21 143          Therapy Plan Review/Discharge Plan (OT)    Anticipated Discharge Disposition (OT)  skilled nursing facility;other  (see comments) vs. LTACH pending medical needs  -CS     Row Name 06/07/21 1439          Vital Signs    Pre Systolic BP Rehab  -- RN cleared for eval, present throughout  -CS     Pre Treatment Diastolic BP  52  -CS     Post Systolic BP Rehab  98  -CS     Post Treatment Diastolic BP  53  -CS     Posttreatment Heart Rate (beats/min)  87  -CS     O2 Delivery Pre Treatment  room air  -CS     O2 Delivery Intra Treatment  room air  -CS     Post SpO2 (%)  95  -CS     O2 Delivery Post Treatment  room air  -CS     Pre Patient Position  Supine  -CS     Intra Patient Position  Side Lying  -CS     Post Patient Position  Supine  -CS     Row Name 06/07/21 1439          Positioning and Restraints    Pre-Treatment Position  in bed  -CS     Post Treatment Position  bed  -CS     In Bed  notified nsg;supine;call light within reach;encouraged to call for assist;exit alarm on;heels elevated;waffle boots/both;RUE elevated;LUE elevated  -CS     Restraints  released:;reapplied:;soft limb;notified nsg: BUE  -CS       User Key  (r) = Recorded By, (t) = Taken By, (c) = Cosigned By    Initials Name Provider Type    CS Serafin Sierra, OT Occupational Therapist        Outcome Measures     Row Name 06/07/21 1448          How much help from another is currently needed...    Putting on and taking off regular lower body clothing?  1  -CS     Bathing (including washing, rinsing, and drying)  1  -CS     Toileting (which includes using toilet bed pan or urinal)  1  -CS     Putting on and taking off regular upper body clothing  1  -CS     Taking care of personal grooming (such as brushing teeth)  1  -CS     Eating meals  1  -CS     AM-PAC 6 Clicks Score (OT)  6  -CS     Row Name 06/07/21 0800          How much help from another person do you currently need...    Turning from your back to your side while in flat bed without using bedrails?  1  -ER     Moving from lying on back to sitting on the side of a flat bed without bedrails?  1  -ER     Moving to  and from a bed to a chair (including a wheelchair)?  1  -ER     Standing up from a chair using your arms (e.g., wheelchair, bedside chair)?  1  -ER     Climbing 3-5 steps with a railing?  1  -ER     To walk in hospital room?  1  -ER     AM-PAC 6 Clicks Score (PT)  6  -ER     Row Name 06/07/21 1448          Functional Assessment    Outcome Measure Options  AM-PAC 6 Clicks Daily Activity (OT)  -CS       User Key  (r) = Recorded By, (t) = Taken By, (c) = Cosigned By    Initials Name Provider Type    ER Yoli Rosales, RN Registered Nurse    Serafin Savage OT Occupational Therapist        Occupational Therapy Education                 Title: PT OT SLP Therapies (In Progress)     Topic: Occupational Therapy (In Progress)     Point: ADL training (In Progress)     Description:   Instruct learner(s) on proper safety adaptation and remediation techniques during self care or transfers.   Instruct in proper use of assistive devices.              Learning Progress Summary           Patient Acceptance, E,D, NR,NL by CS at 6/7/2021 1448    Comment: OT role and POCFATIMAH HEP, safety awareness                   Point: Home exercise program (Not Started)     Description:   Instruct learner(s) on appropriate technique for monitoring, assisting and/or progressing therapeutic exercises/activities.              Learner Progress:  Not documented in this visit.          Point: Precautions (In Progress)     Description:   Instruct learner(s) on prescribed precautions during self-care and functional transfers.              Learning Progress Summary           Patient Acceptance, E,D, NR,NL by CS at 6/7/2021 1448    Comment: OT role and POCFATIMAH HEP, safety awareness                   Point: Body mechanics (Not Started)     Description:   Instruct learner(s) on proper positioning and spine alignment during self-care, functional mobility activities and/or exercises.              Learner Progress:  Not documented in this  visit.                      User Key     Initials Effective Dates Name Provider Type Discipline     10/21/20 -  Serafin Sierra OT Occupational Therapist OT              OT Recommendation and Plan  Planned Therapy Interventions (OT): activity tolerance training, functional balance retraining, occupation/activity based interventions, strengthening exercise, transfer/mobility retraining, BADL retraining, adaptive equipment training  Therapy Frequency (OT): daily  Plan of Care Review  Plan of Care Reviewed With: patient  Progress: no change  Outcome Summary: Initial OT evaluation completed, limitted to bed level d/t cognitive status and restraints. Pt presents w/ significant generalized weakness, impaired cognition and balance, and decreased activity tolerance/endurance warranting skilled OT services to promote return to PLOF. Therapist assisted nursing w/ bed mobility for hygiene and linen change, Pt was dependent for all bed mobility, toileting, grooming, and feeding (tube). Pt tolerated BUE AAROM HEP (1/10reps) in supine. Recommend d/c to SNF vs. LTACH pending medical needs.     Time Calculation:   Time Calculation- OT     Row Name 06/07/21 1451             Time Calculation- OT    OT Start Time  1414  -CS      OT Received On  06/07/21  -CS      OT Goal Re-Cert Due Date  06/17/21  -CS         Timed Charges    24626 - OT Therapeutic Exercise Minutes  8  -CS         Untimed Charges    OT Eval/Re-eval Minutes  36  -CS         Total Minutes    Timed Charges Total Minutes  8  -CS      Untimed Charges Total Minutes  36  -CS       Total Minutes  44  -CS        User Key  (r) = Recorded By, (t) = Taken By, (c) = Cosigned By    Initials Name Provider Type    CS Serafin Sierra OT Occupational Therapist        Therapy Charges for Today     Code Description Service Date Service Provider Modifiers Qty    68178722733 HC OT THER PROC EA 15 MIN 6/7/2021 Serafin Sierra OT GO 1    04250516560 HC OT EVAL MOD COMPLEXITY 3  6/7/2021 Serafin Sierra, OT GO 1               Serafin Sierra, OT  6/7/2021

## 2021-06-07 NOTE — PROGRESS NOTES
"GI Daily Progress Note  Subjective:    Chief Complaint: Asked about her purse and her telephone    She has had decreased urine output with nephrology seeing patient recommending dialysis.  Patient underwent ultrasound revealing ascites additionally patient has had more increased edema.  The history is provided by the patient's nurse at bedside reports that the patient's sister and critical care team had discussion with regards to goals of care and palliative team visit pending    Objective:    BP 95/48   Pulse 88   Temp 97 °F (36.1 °C) (Bladder)   Resp 22   Ht 165.1 cm (65\")   Wt 93 kg (205 lb)   SpO2 96%   BMI 34.11 kg/m²     Physical Exam  Constitutional:       Appearance: She is not ill-appearing.   HENT:      Head: Normocephalic.      Comments: Nasal tube feed     Nose: Nose normal.   Eyes:      General: Scleral icterus present.   Cardiovascular:      Rate and Rhythm: Normal rate and regular rhythm.      Pulses: Normal pulses.   Pulmonary:      Effort: Pulmonary effort is normal.      Breath sounds: Normal breath sounds.   Abdominal:      General: There is distension.      Palpations: Abdomen is soft.      Comments: Healed scar from bariatric surgery   Musculoskeletal:         General: Swelling present.      Right lower leg: Edema present.      Left lower leg: Edema present.   Skin:     General: Skin is warm and dry.   Neurological:      Comments: Oriented to person only         Lab  Lab Results   Component Value Date    WBC 28.32 (H) 06/07/2021    HGB 11.0 (L) 06/07/2021    HGB 10.6 (L) 06/06/2021    HGB 11.0 (L) 06/05/2021    .9 (H) 06/07/2021     06/07/2021    INR 1.52 (H) 06/07/2021    INR 1.79 (H) 06/05/2021    INR 3.34 (H) 06/03/2021    INR 3.68 (H) 06/02/2021    INR 2.82 (H) 06/01/2021       Lab Results   Component Value Date    GLUCOSE 93 06/07/2021    BUN 74 (H) 06/07/2021    CREATININE 4.97 (H) 06/07/2021    EGFRIFNONA 11 (L) 06/06/2021    EGFRIFAFRI  06/06/2021      Comment:      " <15 Indicative of kidney failure.    BCR 13.5 06/06/2021     (L) 06/07/2021    K 3.8 06/07/2021    CO2 20.0 (L) 06/07/2021    CALCIUM 8.7 06/07/2021    ALBUMIN 2.70 (L) 06/07/2021    ALKPHOS 247 (H) 06/07/2021    BILITOT 10.4 (H) 06/07/2021    BILIDIR 8.6 (H) 06/06/2021     (H) 06/07/2021     (H) 06/07/2021       Assessment:      Sepsis and septic shock due to Gram negative bacteria.  Urinary tract origin (CMS/HCC)    Alcoholism (CMS/HCC)    Moderate episode of recurrent major depressive disorder (CMS/HCC)    Lactic acidosis resolved    Hyponatremia resolving    Hypokalemia    Elevated bilirubin    Leukocytosis    Severe anemia due to GI losses (source unknown)    E. coli UTI (urinary tract infection)    Acute renal failure (CMS/HCC)    Shock liver improved    Hypophosphatemia    Mild to moderate mitral stenosis on echocardiogram    Alcoholic cirrhosis (CMS/HCC)    1.  Transaminitis improving  2.  [place] cirrhosis  3.  Alcohol abuse  4.  Hyponatremia  5.  Acute renal injury  6.  Metabolic acidosis  7.  Status post gastric sleeve and cholecystectomy  8.  Coagulopathy  9.  Malnutrition  10.  Ascites found on ultrasound    Patient has worsening renal failure.  Has been clinically more awake today then Saturday.  Would follow her overall status clinically rather than with ammonia level.  Agree with xifaxan and can restart lactulose (was held due to excessive diarrhea)  Continue nutritional support.  Per RN patient's family aware of guarded overall prognosis.    Plan:    Suspect etiology of ascites multifactorial with poor renal function, malnutrition, cirrhosis.  It may get worse if patient does not do dialysis but again palliative to see patient.    Continue supportive care with nutrition, fluids and pressors.  Agree with thiamine which may be also be in her tube feeding.     Discussed with patient's RN.     Will follow peripherally               Sofya Carlisle MD  06/07/21  13:37 EDT

## 2021-06-07 NOTE — PAYOR COMM NOTE
"Ref # [id number]  Alesha Garsia RN, BSN  Phone # [phone]  Fax # [phone]  Radha Patiño (56 y.o. Female)     Date of Birth Social Security Number Address Home Phone MRN    1965  [id number] [facility] [phone] [id number]    [employer] Status          [employer]        Admission Date Admission Type Admitting Provider Attending Provider Department, Room/Bed    6/1/21 Emergency Wilmer Garsia MD Thompson, Patton Weddington, MD [facility] 2HSIC, S260/1    Discharge Date Discharge Disposition Discharge Destination                       Attending Provider: Wilmer Garsia MD    Allergies: No Known Allergies    Isolation: None   Infection: MRSA (06/02/21)   Code Status: No CPR    Ht: 165.1 cm (65\")   Wt: 93 kg (205 lb)    Admission Cmt: None   Principal Problem: Sepsis and septic shock due to Gram negative bacteria.  Urinary tract origin (CMS/[facility]) [A41.50]                 Active Insurance as of 6/1/2021     Primary Coverage     Payor Plan Insurance Group Employer/Plan Group    [employer]UE [employer] PPO [id number]     Payor Plan Address Payor Plan Phone Number Payor Plan Fax Number Effective Dates    [address] [phone]  1/1/2020 - None Entered    [facility] [id number]       Subscriber Name Subscriber Birth Date Member ID       RADHA PATIÑO 1965 [id number]           Secondary Coverage     Payor Plan Insurance Group Employer/Plan Group    [facility]      Payor Plan Address Payor Plan Phone Number Payor Plan Fax Number Effective Dates    [address] [phone]  3/25/2020 - None Entered    [facility] [address]       Subscriber Name Subscriber Birth Date Member ID       RADHA PATIÑO 1965 [id number]                   Current Facility-Administered Medications   Medication Dose Route Frequency Provider Last Rate Last Admin   • albumin human 25 % IV SOLN 12.5 g  12.5 g Intravenous PRN " Michael English MD       • dextrose (D50W) 25 g/ 50mL Intravenous Solution 25 g  25 g Intravenous Q15 Min PRN Wilmer Garsia MD   25 g at 06/02/21 1220   • folic acid 1 mg in sodium chloride 0.9 % 50 mL IVPB  1 mg Intravenous Daily Wilmer Garsia  mL/hr at 06/07/21 0806 1 mg at 06/07/21 0806   • heparin (porcine) 1000 UNIT/ML injection  - ADS Override Pull            • heparin (porcine) infusion  - ADS Override Pull            • insulin regular (humuLIN R,novoLIN R) injection 0-7 Units  0-7 Units Subcutaneous Q6H Wilmer Garsia MD   2 Units at 06/06/21 1334   • lactulose (CHRONULAC) 10 GM/15ML solution 10 g  10 g Nasogastric TID Candido Capps MD   10 g at 06/07/21 0806   • Linezolid (ZYVOX) 600 mg 300 mL  600 mg Intravenous Q12H Candido Capps  mL/hr at 06/07/21 0806 600 mg at 06/07/21 0806   • midodrine (PROAMATINE) tablet 5 mg  5 mg Nasogastric TID AC Candido Capps MD   5 mg at 06/07/21 1040   • multivitamin and minerals liquid 15 mL  15 mL Nasogastric Daily Sofya Calderon, PharmD   15 mL at 06/07/21 0806   • norepinephrine (LEVOPHED) 8 mg in 250 mL NS infusion (premix)  0.02-0.3 mcg/kg/min Intravenous Titrated Wilmer Garsia MD 6.98 mL/hr at 06/07/21 0000 0.04 mcg/kg/min at 06/07/21 0000   • [START ON 6/8/2021] pantoprazole (PROTONIX) injection 40 mg  40 mg Intravenous Q AM Meño Strong MD       • piperacillin-tazobactam (ZOSYN) 3.375 g in iso-osmotic dextrose 50 ml (premix)  3.375 g Intravenous Q12H Sofya Calderon, PharmD   3.375 g at 06/07/21 0629   • rifAXIMin (XIFAXAN) tablet 400 mg  400 mg Nasogastric Q8H Candido Capps MD   400 mg at 06/07/21 0630   • saccharomyces boulardii (FLORASTOR) capsule 500 mg  500 mg Oral BID Candido Capps MD   500 mg at 06/07/21 0806   • sodium chloride 0.9 % flush 10 mL  10 mL Intravenous Q12H Raquel Vega, APRKYLE   10 mL at 06/06/21 2157   • sodium chloride 0.9 % flush 10 mL  10 mL  Intravenous PRN Raquel Vega, DAVIAN       • thiamine (B-1) 200 mg in sodium chloride 0.9 % 100 mL IVPB  200 mg Intravenous Daily Meño Strong MD         Lab Results (last 48 hours)     Procedure Component Value Units Date/Time    POC Glucose Once [[id number]]  (Normal) Collected: 06/07/21 1150    Specimen: Blood Updated: 06/07/21 1154     Glucose 93 mg/dL     Hepatitis Panel, Acute [[id number]]  (Normal) Collected: 06/07/21 1009    Specimen: Blood Updated: 06/07/21 1101     Hepatitis B Surface Ag Non-Reactive     Hep A IgM Non-Reactive     Hep B C IgM Non-Reactive     Hepatitis C Ab Non-Reactive    Narrative:      Results may be falsely decreased if patient taking Biotin.     POC Glucose Once [[id number]]  (Normal) Collected: 06/07/21 0935    Specimen: Blood Updated: 06/07/21 0936     Glucose 114 mg/dL     Prealbumin [[id number]]  (Abnormal) Collected: 06/07/21 0257    Specimen: Blood Updated: 06/07/21 0920     Prealbumin 3.0 mg/dL     POC Glucose Once [[id number]]  (Normal) Collected: 06/07/21 0606    Specimen: Blood Updated: 06/07/21 0608     Glucose 80 mg/dL     aPTT [[id number]]  (Abnormal) Collected: 06/07/21 0257    Specimen: Blood Updated: 06/07/21 0404     PTT 40.0 seconds     Narrative:      PTT = The equivalent PTT values for the therapeutic range of heparin levels at 0.3 to 0.5 U/ml are 55 to 70 seconds.    Protime-INR [[id number]]  (Abnormal) Collected: 06/07/21 0257    Specimen: Blood Updated: 06/07/21 0404     Protime 17.7 Seconds      INR 1.52    Nutrition Panel [[id number]]  (Abnormal) Collected: 06/07/21 0257    Specimen: Blood Updated: 06/07/21 0356     Glucose 93 mg/dL      BUN 74 mg/dL      Creatinine 4.97 mg/dL      Sodium 135 mmol/L      Potassium 3.8 mmol/L      Chloride 101 mmol/L      CO2 20.0 mmol/L      Calcium 8.7 mg/dL      Alkaline Phosphatase 247 U/L      ALT (SGPT) 208 U/L      AST (SGOT) 368 U/L      Total Cholesterol 52 mg/dL      Triglycerides 88 mg/dL      Total Protein 5.1 g/dL       Albumin 2.70 g/dL      Phosphorus 3.4 mg/dL      Total Bilirubin 10.4 mg/dL      Magnesium 2.0 mg/dL      Anion Gap 14.0 mmol/L     Narrative:      Cholesterol Reference Ranges:   Desirable       < 200 mg/dL   Borderline    200-239 mg/dL   High Risk       > 239 mg/dL    Triglyceride Reference Ranges:   Normal          < 150 mg/dL   Borderline    150-199 mg/dL   High          200-499 mg/dL   Very High       > 499 mg/dL    Manual Differential [[id number]]  (Abnormal) Collected: 06/07/21 0257    Specimen: Blood Updated: 06/07/21 0356     Neutrophil % 63.0 %      Lymphocyte % 10.0 %      Monocyte % 6.0 %      Eosinophil % 2.0 %      Basophil % 0.0 %      Bands %  8.0 %      Metamyelocyte % 6.0 %      Myelocyte % 5.0 %      Neutrophils Absolute 20.11 10*3/mm3      Lymphocytes Absolute 2.83 10*3/mm3      Monocytes Absolute 1.70 10*3/mm3      Eosinophils Absolute 0.57 10*3/mm3      Basophils Absolute 0.00 10*3/mm3      RBC Morphology Normal     WBC Morphology Normal     Platelet Morphology Normal    CBC & Differential [[id number]]  (Abnormal) Collected: 06/07/21 0257    Specimen: Blood Updated: 06/07/21 0356    Narrative:      The following orders were created for panel order CBC & Differential.  Procedure                               Abnormality         Status                     ---------                               -----------         ------                     Scan Slide[[id number]]                                       Final result               CBC Auto Differential[[id number]]        Abnormal            Final result                 Please view results for these tests on the individual orders.    Scan Slide [[id number]] Collected: 06/07/21 0257    Specimen: Blood Updated: 06/07/21 0356     Scan Slide --     Comment: See Manual Differential Results       CBC Auto Differential [[id number]]  (Abnormal) Collected: 06/07/21 0257    Specimen: Blood Updated: 06/07/21 0356     WBC 28.32 10*3/mm3      RBC 3.20 10*6/mm3       Hemoglobin 11.0 g/dL      Hematocrit 32.3 %      .9 fL      MCH 34.4 pg      MCHC 34.1 g/dL      RDW 20.4 %      RDW-SD 68.9 fl      MPV 10.9 fL      Platelets 143 10*3/mm3     Narrative:      The previously reported component NRBC is no longer being reported. Previous result was 0.7 /100 WBC (Reference Range: 0.0-0.2 /100 WBC) on 6/7/2021 at 0332 EDT.    Blood Culture - Blood, Arm, Left [[id number]] Collected: 06/01/21 2252    Specimen: Blood from Arm, Left Updated: 06/07/21 0000     Blood Culture No growth at 5 days    POC Glucose Once [[id number]]  (Abnormal) Collected: 06/06/21 2314    Specimen: Blood Updated: 06/06/21 2325     Glucose 139 mg/dL     POC Glucose Once [[id number]]  (Normal) Collected: 06/06/21 1750    Specimen: Blood Updated: 06/06/21 1752     Glucose 105 mg/dL     POC Glucose Once [[id number]]  (Abnormal) Collected: 06/06/21 1202    Specimen: Blood Updated: 06/06/21 1207     Glucose 153 mg/dL     Clostridium Difficile Toxin - Stool, Per Rectum [[id number]]  (Normal) Collected: 06/06/21 0917    Specimen: Stool from Per Rectum Updated: 06/06/21 1018    Narrative:      The following orders were created for panel order Clostridium Difficile Toxin - Stool, Per Rectum.  Procedure                               Abnormality         Status                     ---------                               -----------         ------                     Clostridium Difficile To...[[id number]]  Normal              Final result                 Please view results for these tests on the individual orders.    Clostridium Difficile Toxin, PCR - Stool, Per Rectum [[id number]]  (Normal) Collected: 06/06/21 0917    Specimen: Stool from Per Rectum Updated: 06/06/21 1018     C. Difficile Toxins by PCR Not Detected    Narrative:      Performance characteristics of test not established for patients <2 years of age.  Negative for Toxigenic C. Difficile    Manual Differential [[id number]]  (Abnormal) Collected: 06/06/21 0342     Specimen: Blood Updated: 06/06/21 0854     Neutrophil % 47.0 %      Lymphocyte % 15.0 %      Monocyte % 10.0 %      Eosinophil % 2.0 %      Basophil % 0.0 %      Bands %  9.0 %      Metamyelocyte % 5.0 %      Myelocyte % 12.0 %      Neutrophils Absolute 14.48 10*3/mm3      Lymphocytes Absolute 3.88 10*3/mm3      Monocytes Absolute 2.59 10*3/mm3      Eosinophils Absolute 0.52 10*3/mm3      Basophils Absolute 0.00 10*3/mm3      nRBC 3.0 /100 WBC      RBC Morphology Normal     Smudge Cells Slight/1+     Platelet Morphology Normal    CBC & Differential [[id number]]  (Abnormal) Collected: 06/06/21 0342    Specimen: Blood Updated: 06/06/21 0854    Narrative:      The following orders were created for panel order CBC & Differential.  Procedure                               Abnormality         Status                     ---------                               -----------         ------                     Scan Slide[[id number]]                                                                  CBC Auto Differential[[id number]]        Abnormal            Final result                 Please view results for these tests on the individual orders.    CBC Auto Differential [[id number]]  (Abnormal) Collected: 06/06/21 0342    Specimen: Blood Updated: 06/06/21 0854     WBC 25.86 10*3/mm3      RBC 3.15 10*6/mm3      Hemoglobin 10.6 g/dL      Hematocrit 30.8 %      MCV 97.8 fL      MCH 33.7 pg      MCHC 34.4 g/dL      RDW 18.6 %      RDW-SD 63.8 fl      MPV 11.1 fL      Platelets 165 10*3/mm3     Narrative:      The previously reported component NRBC is no longer being reported. Previous result was 2.5 /100 WBC (Reference Range: 0.0-0.2 /100 WBC) on 6/6/2021 at 0555 EDT.    Lactate Dehydrogenase [[id number]]  (Abnormal) Collected: 06/06/21 0342    Specimen: Blood Updated: 06/06/21 0625     LDH 1,033 U/L     Basic Metabolic Panel [[id number]]  (Abnormal) Collected: 06/06/21 0342    Specimen: Blood Updated: 06/06/21 0623     Glucose 112 mg/dL   "    BUN 57 mg/dL      Creatinine 4.22 mg/dL      Sodium 134 mmol/L      Potassium 3.1 mmol/L      Chloride 99 mmol/L      CO2 21.0 mmol/L      Calcium 8.7 mg/dL      eGFR   Amer --     Comment: <15 Indicative of kidney failure.        eGFR Non African Amer 11 mL/min/1.73      Comment: <15 Indicative of kidney failure.        BUN/Creatinine Ratio 13.5     Anion Gap 14.0 mmol/L     Narrative:      GFR Normal >60  Chronic Kidney Disease <60  Kidney Failure <15      Phosphorus [[id number]]  (Abnormal) Collected: 06/06/21 0342    Specimen: Blood Updated: 06/06/21 0623     Phosphorus 2.0 mg/dL     Hepatic Function Panel [[id number]]  (Abnormal) Collected: 06/06/21 0342    Specimen: Blood Updated: 06/06/21 0614     Total Protein 5.0 g/dL      Albumin 2.70 g/dL      ALT (SGPT) 260 U/L      AST (SGOT) 488 U/L      Alkaline Phosphatase 247 U/L      Total Bilirubin 10.6 mg/dL      Bilirubin, Direct 8.6 mg/dL      Bilirubin, Indirect 2.0 mg/dL     Procalcitonin [[id number]]  (Abnormal) Collected: 06/06/21 0342    Specimen: Blood Updated: 06/06/21 0611     Procalcitonin 2.10 ng/mL     Narrative:      As a Marker for Sepsis (Non-Neonates):     1. <0.5 ng/mL represents a low risk of severe sepsis and/or septic shock.  2. >2 ng/mL represents a high risk of severe sepsis and/or septic shock.    As a Marker for Lower Respiratory Tract Infections that require antibiotic therapy:  PCT on Admission     Antibiotic Therapy             6-12 Hrs later  >0.5                          Strongly Recommended            >0.25 - <0.5             Recommended  0.1 - 0.25                  Discouraged                       Remeasure/reassess PCT  <0.1                         Strongly Discouraged         Remeasure/reassess PCT      As 28 day mortality risk marker: \"Change in Procalcitonin Result\" (>80% or <=80%) if Day 0 (or Day 1) and Day 4 values are available. Refer to http://www.[employer]-pct-calculator.com/    Change in PCT <=80 %   A decrease of " PCT levels below or equal to 80% defines a positive change in PCT test result representing a higher risk for 28-day all-cause mortality of patients diagnosed with severe sepsis or septic shock.    Change in PCT >80 %   A decrease of PCT levels of more than 80% defines a negative change in PCT result representing a lower risk for 28-day all-cause mortality of patients diagnosed with severe sepsis or septic shock.              Results may be falsely decreased if patient taking Biotin.     Lactic Acid, Plasma [[id number]]  (Normal) Collected: 06/06/21 0342    Specimen: Blood Updated: 06/06/21 0604     Lactate 2.0 mmol/L      Comment: Falsely depressed results may occur on samples drawn from patients receiving N-Acetylcysteine (NAC) or Metamizole.       Ammonia [[id number]]  (Abnormal) Collected: 06/06/21 0342    Specimen: Blood Updated: 06/06/21 0538     Ammonia 120 umol/L     POC Glucose Once [[id number]]  (Normal) Collected: 06/06/21 0507    Specimen: Blood Updated: 06/06/21 0509     Glucose 129 mg/dL     POC Glucose Once [[id number]]  (Abnormal) Collected: 06/05/21 2306    Specimen: Blood Updated: 06/05/21 2307     Glucose 135 mg/dL     POC Glucose Once [[id number]]  (Normal) Collected: 06/05/21 1756    Specimen: Blood Updated: 06/05/21 1757     Glucose 100 mg/dL           Imaging Results (Last 48 Hours)     Procedure Component Value Units Date/Time    US Liver [[id number]] Collected: 06/07/21 1118     Updated: 06/07/21 1123    Narrative:      EXAMINATION: US LIVER-     INDICATION: Please evaluate for ascites (four quadrants); alcoholic  hepatitis; please also evaluate entire liver for any lesions;  N17.9-Acute kidney failure, unspecified; E87.8-Byte-hacjjzvadn and  hyponatremia; E87.6-Hypokalemia; D64.9-Anemia, unspecified;  F10.20-Alcohol dependence, uncomplicated.      TECHNIQUE: Ultrasound right upper quadrant abdomen and liver.     COMPARISON: None.     FINDINGS: Visualized portions of the pancreas is within normal  limits.     Liver demonstrates coarsened echotexture with nodular contours of  cirrhotic hepatic morphology with adjacent ascites in all four quadrants  noted on limiting.     Gallbladder is surgically absent.     Common bile duct 7 mm in diameter.     Right kidney 10.2 cm in length without evidence of hydronephrosis,  contour deforming mass or obvious calculi.       Impression:      Cirrhotic hepatic morphology with ascites in all four  quadrants.     D:  06/07/2021  E:  06/07/2021           XR Chest 1 View [[id number]] Collected: 06/07/21 0830     Updated: 06/07/21 0910    Narrative:      EXAMINATION: XR CHEST 1 VW-      INDICATION: Respiratory failure; N17.9-Acute kidney failure,  unspecified; E87.6-Vnja-efntcapzqf and hyponatremia; E87.6-Hypokalemia;  D64.9-Anemia, unspecified; F10.20-Alcohol dependence, uncomplicated.      COMPARISON: 06/05/2021.     FINDINGS: Portable chest reveals heart to be borderline enlarged with  prominence of the pulmonary vascularity. Mild elevation identified of  the right hemidiaphragm. Degenerative change is seen within the spine.  No new focal parenchymal opacification is present.           Impression:      Prominence of the pulmonary vascularity. The heart is  enlarged. Lines and tubes in satisfactory position. Small left pleural  effusion.     D:  06/07/2021  E:  06/07/2021           Operative/Procedure Notes (last 48 hours) (Notes from 06/05/21 1304 through 06/07/21 1304)    No notes of this type exist for this encounter.          Physician Progress Notes (last 48 hours) (Notes from 06/05/21 1304 through 06/07/21 1304)      Meño Strong MD at 06/07/21 1047          Intensive Care Follow-up     Hospital:  LOS: 6 days   Ms. Laura Jang, 56 y.o. female is followed for:   Sepsis due to Gram negative bacteria (CMS/HCC)     Subjective       History of present illness:   55-year-old white female non-smoker with a history of significant alcoholism, hypertension, obesity and both  "sleeve gastrectomy, lap band, and cholecystectomy. Has significant chronic anxiety/depression, and DDD.  Apparently lost her job 4 weeks ago and has had increased alcohol intake.  With the help of her therapist, 2 weeks ago began to wean off alcohol and began to note increasing weakness and lethargy.  Was also apparently begun on unknown \"new medication\".  On 6/1/2021 a friend who had not talked to her in several days went by patient's house and found her on the floor surrounded by beer cans with fecal incontinence.  Upon EMS arrival glucose was in the 30s and patient was hypotensive with a SBP in the 80s.  D50 led to improvement in mentation and she was transferred to [facility] ER.  In the ER hematocrit 25, WBC 12.97 with a left shift, procalcitonin 3.31, platelets 183, sodium 107, potassium 3.0, chloride 64, bicarb 16, BUN 18, creatinine 4.48, alk phos 139, , AST 2,211, bilirubin 6.7, amylase 56, INR 2.82, serum ammonia 58, and albumin 3.0.  Cortisol was 65. ProBNP 3,736.  ABGs revealed pH 7.42, PCO2 24, PO2 80 on room air but she had a lactic acid of 9.2.. Urinalysis revealed TNTC  WBCs and 4+ bacteria.  Patient was cultured up and received a dose of Zosyn but never received vancomycin.  Was also begun on fluids and pressors.  By 6/2/2021 patient seemed to feel better with fluids, pressors, and antimicrobial therapy and lactic acid level decreased.  Mentation initially seemed to improve but when hematocrit dropped to 22 she required transfusion with PRBC which was associated with increased serum ammonia and increased confusion.  Urine cultures returned growing a sensitive E. coli and she was continued on Zosyn (a positive flora in her blood culture was felt to be contaminant). Nasal swab PCR however was positive for MRSA. Renal function remained compromised  and nephrology was asked to see.  Patient had remained a full code per her estranged , but after discussions between the  and the patient's " sister who is a physician practicing in [place], decisions were made to proceed with no CPR and DNI but continued limited support. (End of copied text)    Patient grew Clostridium and blood cultures and ID team is evaluating and help manage.  Echocardiogram showing ejection fraction hyperdynamic but right ventricular pressure elevated.    Further history obtained from sister and patient has a history of alcohol abuse.  She has been sober for couple of months at a time but relapses.  Had pretty significant head injury last year and was admitted in the hospital at  requiring debridement of scalp lesions and grafting.  She was living with her sister for a while but relapsed and now she was living by herself in a hotel quitting alcohol has been a problem for her.  Her  lives in [facility] who apparently has delegated medical decision making to patient's sister who is practicing physician here in [facility].    Subjective   Interval History:  Overnight remained on low dose norepinephrine. Remains oliguric. Significant number of bowel movements overnight.  Lactulose has been held overnight.  Remains encephalopathic.                 The patient's past medical, surgical and social history were reviewed and updated in Epic as appropriate.    Objective   Objective     Infusions:  norepinephrine, 0.02-0.3 mcg/kg/min, Last Rate: 0.04 mcg/kg/min (06/07/21 0000)  sodium chloride 0.9 % with KCl 20 mEq, 50 mL/hr, Last Rate: 50 mL/hr (06/07/21 0955)      Medications:  famotidine, 20 mg, Nasogastric, Daily  folic acid (FOLVITE) IVPB, 1 mg, Intravenous, Daily  heparin (porcine), , ,   heparin (porcine), , ,   insulin regular, 0-7 Units, Subcutaneous, Q6H  lactulose, 10 g, Nasogastric, TID  Linezolid, 600 mg, Intravenous, Q12H  midodrine, 5 mg, Nasogastric, TID AC  multivitamin and minerals, 15 mL, Nasogastric, Daily  piperacillin-tazobactam, 3.375 g, Intravenous, Q12H  rifAXIMin, 400 mg, Nasogastric,  Q8H  saccharomyces boulardii, 500 mg, Oral, BID  sodium chloride, 10 mL, Intravenous, Q12H        Vital Sign Min/Max for last 24 hours  Temp  Min: 96.8 °F (36 °C)  Max: 98.1 °F (36.7 °C)   BP  Min: 91/61  Max: 127/65   Pulse  Min: 83  Max: 101   Resp  Min: 22  Max: 26   SpO2  Min: 93 %  Max: 100 %   Flow (L/min)  Min: 2  Max: 4       Input/Output for last 24 hour shift  06/06 0701 - 06/07 0700  In: 2375 [I.V.:1665]  Out: 1015 [Urine:215]      Objective  General Appearance: Eyes open, and mild respiratory distress with paradoxical breathing but without any acute distress  Head:    Atraumatic, Normocephalic, scleral icterus, Pupils reactive & symmetrical B/L.  Neck:   Supple.   Lungs:   B/L Breath sounds present with decreased breath sounds on bases, no wheezing heard, occ crackles.   Heart: S1 and S2 present, no murmur\  Abdomen: Obese soft, nontender, no guarding or rigidity, bowel sounds hypoactive.  Extremities: Atraumatic, no cyanosis or clubbing,  + edema, warm to touch.  Pulses: Positive and symmetric.  Neurologic: Remains encephalopathic and wrist  restraints    Results from last 7 days   Lab Units 06/07/21  0257 06/06/21 0342 06/05/21  0321   WBC 10*3/mm3 28.32* 25.86* 23.15*   HEMOGLOBIN g/dL 11.0* 10.6* 11.0*   PLATELETS 10*3/mm3 143 165 179     Results from last 7 days   Lab Units 06/07/21  0257 06/06/21  0342 06/05/21  0321 06/04/21  0317   SODIUM mmol/L 135* 134* 133* 125*   POTASSIUM mmol/L 3.8 3.1* 2.9* 2.9*   CO2 mmol/L 20.0* 21.0* 22.0 22.0   BUN mg/dL 74* 57* 39* 28*   CREATININE mg/dL 4.97* 4.22* 4.31* 4.69*   MAGNESIUM mg/dL 2.0  --  1.9 2.3   PHOSPHORUS mg/dL 3.4 2.0* 1.6* 0.5*   GLUCOSE mg/dL 93 112* 122* 181*     Estimated Creatinine Clearance: 14.1 mL/min (A) (by C-G formula based on SCr of 4.97 mg/dL (H)).    Results from last 7 days   Lab Units 06/05/21  0312   PH, ARTERIAL pH units 7.447   PCO2, ARTERIAL mm Hg 32.1*   PO2 ART mm Hg 68.5*       Images:   Chest x-ray reviewed personally and  showed cardiomegaly with probable small left pleural effusion.  Mild prominent pulmonary vasculature noted.    I reviewed the patient's results and images.     Assessment/Plan   Impression        Sepsis and septic shock due to Gram negative bacteria.  Urinary tract origin (CMS/HCC)    Alcoholism (CMS/HCC)    Moderate episode of recurrent major depressive disorder (CMS/HCC)    Lactic acidosis resolved    Hyponatremia resolving    Hypokalemia    Elevated bilirubin    Leukocytosis    Severe anemia due to GI losses (source unknown)    E. coli UTI (urinary tract infection)    Acute renal failure (CMS/HCC)    Shock liver improved    Hypophosphatemia    Mild to moderate mitral stenosis on echocardiogram    Alcoholic cirrhosis (CMS/HCC)       Plan        1.  Patient presenting with transaminitis, encephalopathy, with history of alcohol abuse.  There has been history of Thorne cirrhosis as well.  GI team is following.  Patient does have portosystemic encephalopathy.  Has been on rifaximin and lactulose.  Significant bowel movements noted overnight.  Lactulose has been held.  Ammonia level has not been checked in quite variable.  Encephalopathy still persisting.  Will continue lactulose as needed.  Continue rifaximin.  We will add thiamine daily.    2.  Transaminitis improving but bilirubin elevation also noted.  Alk phos is stable.  Synthetic function has shown improvement in coagulopathy.  Liver ultrasound has been done but pending.  Hepatitis antibodies were positive but IgM antibody negative.  Continue supportive care.    3.  Patient will been worsening renal failure.  Multiple possible etiologies including ATN from sepsis, hepatorenal syndrome, intrinsic renal disease.  Baseline creatinine in 2020 August was normal.  Remains oliguric.  Electrolytes currently are acceptable.  Discussed with nephrology and they are anticipating hemodialysis support.  We initially were planning to put tunneled dialysis line to start  hemodialysis but sister wants to hold off on that and looking at more long-term prognosis and quality of life.  We will have palliative care team visit with.    4.  Change famotidine to Protonix for GI prophylaxis.    5.  Enteral nutrition to continue.  Not awake enough to undergo speech evaluation at this time.    6.  Encephalopathy multifactorial, continue supportive care.    7.  Clostridium and bacterial cultures.  On Zosyn Zyvox per ID.    8.  Monitor blood glucose level to to monitor for hypoglycemia.    Continue supportive care at this time.  Patient remains critically ill with guarded overall prognosis.  Sister is aware of it.  Patient's estranged  apparently is on the way from [facility] as well.  Family struggling with further course.  They do not want to subject her to long-term dialysis.  Discussed that cultures may be temporary but it is hard to delineate at this time.  She verbalized understanding.  We will have palliative care team work with family to discuss goals of care.    Plan of care and goals reviewed with multidisciplinary/antibiotic stewardship team during rounds.   I discussed the patient's findings and my recommendations with patient, family and nursing staff     High level of risk due to:  illness with threat to life or bodily function.    Time spent Critical care 30 min (exclusive of procedure time)  including high complexity decision making to assess, manipulate, and support vital organ system failure in this individual who has impairment of one or more vital organ systems such that there is a high probability of imminent or life threatening deterioration in the patient’s condition.      Meoñ Strong MD, [facility]  Pulmonary, Critical care and Sleep Medicine         Electronically signed by Meño Strong MD at 06/07/21 1213     Michael English MD at 06/07/21 6315             LOS: 6 days    Patient Care Team:  Renetta Santizo MD as PCP - General (Internal  "Medicine)    Reason For Visit:  F/U ABDI  Subjective           Review of Systems: UNOBTAINABLE.      Objective     famotidine, 20 mg, Nasogastric, Daily  folic acid (FOLVITE) IVPB, 1 mg, Intravenous, Daily  insulin regular, 0-7 Units, Subcutaneous, Q6H  lactulose, 10 g, Nasogastric, TID  Linezolid, 600 mg, Intravenous, Q12H  midodrine, 5 mg, Nasogastric, TID AC  multivitamin and minerals, 15 mL, Nasogastric, Daily  piperacillin-tazobactam, 3.375 g, Intravenous, Q12H  rifAXIMin, 400 mg, Nasogastric, Q8H  saccharomyces boulardii, 500 mg, Oral, BID  sodium chloride, 10 mL, Intravenous, Q12H      norepinephrine, 0.02-0.3 mcg/kg/min, Last Rate: 0.04 mcg/kg/min (06/07/21 0000)  sodium chloride 0.9 % with KCl 20 mEq, 50 mL/hr, Last Rate: 75 mL/hr (06/05/21 1201)          Vital Signs:  Blood pressure 108/66, pulse 83, temperature 96.8 °F (36 °C), temperature source Bladder, resp. rate 22, height 165.1 cm (65\"), weight 93 kg (205 lb), SpO2 100 %, not currently breastfeeding.    Flowsheet Rows      First Filed Value   Admission Height  165.1 cm (65\") Documented at 06/01/2021 1730   Admission Weight  93 kg (205 lb) Documented at 06/01/2021 1730          06/06 0701 - 06/07 0700  In: 2375 [I.V.:1665]  Out: 1015 [Urine:215]    Physical Exam:    General Appearance: NAD, alert. CONFUSED  Eyes: PER, conjunctivae and sclerae normal, no icterus  Lungs: FEW RHONCHI  Heart/CV: regular rhythm & normal rate, no murmur, no gallop, no rub and TR DEPENDENT edema  Abdomen: not distended, soft, non-tender, no masses,  bowel sounds present  Skin: No rash, Warm and dry. RICHIE EAST.    Radiology:            Labs:  Results from last 7 days   Lab Units 06/07/21  0257 06/06/21  0342 06/05/21  0321   WBC 10*3/mm3 28.32* 25.86* 23.15*   HEMOGLOBIN g/dL 11.0* 10.6* 11.0*   HEMATOCRIT % 32.3* 30.8* 31.6*   PLATELETS 10*3/mm3 143 165 179     Results from last 7 days   Lab Units 06/07/21  0257 06/06/21  0342 06/05/21  0321 06/04/21  1755 06/04/21  0317 " 06/03/21  0827   SODIUM mmol/L 135* 134* 133* 129* 125* 121*  122*   POTASSIUM mmol/L 3.8 3.1* 2.9* 3.3* 2.9* 3.5   CHLORIDE mmol/L 101 99 95* 92* 88* 82*   CO2 mmol/L 20.0* 21.0* 22.0 21.0* 22.0 19.0*   BUN mg/dL 74* 57* 39* 34* 28* 24*   CREATININE mg/dL 4.97* 4.22* 4.31* 4.31* 4.69* 4.92*   CALCIUM mg/dL 8.7 8.7 8.9 9.2 9.2 8.8   PHOSPHORUS mg/dL 3.4 2.0* 1.6* 0.6* 0.5* 1.6*   MAGNESIUM mg/dL 2.0  --  1.9  --  2.3 2.4   ALBUMIN g/dL 2.70* 2.70* 2.90* 3.10* 3.30* 3.60     Results from last 7 days   Lab Units 06/07/21  0257   GLUCOSE mg/dL 93       Results from last 7 days   Lab Units 06/07/21  0257 06/06/21  0342   ALK PHOS U/L 247* 247*   BILIRUBIN mg/dL 10.4* 10.6*   BILIRUBIN DIRECT mg/dL  --  8.6*   ALT (SGPT) U/L 208* 260*   AST (SGOT) U/L 368* 488*     Results from last 7 days   Lab Units 06/05/21  0312   PH, ARTERIAL pH units 7.447   PO2 ART mm Hg 68.5*   PCO2, ARTERIAL mm Hg 32.1*   HCO3 ART mmol/L 22.1       Results from last 7 days   Lab Units 06/01/21  1933   COLOR UA  [place]*   CLARITY UA  Turbid*   PH, URINE  5.5   SPECIFIC GRAVITY, URINE  1.013   GLUCOSE UA  Negative   KETONES UA  Trace*   BILIRUBIN UA  Small (1+)*   PROTEIN UA  30 mg/dL (1+)*   BLOOD UA  Small (1+)*   LEUKOCYTES UA  Large (3+)*   NITRITE UA  Positive*       Estimated Creatinine Clearance: 14.1 mL/min (A) (by C-G formula based on SCr of 4.97 mg/dL (H)).      Assessment       Sepsis and septic shock due to Gram negative bacteria.  Urinary tract origin (CMS/HCC)    Alcoholism (CMS/HCC)    Moderate episode of recurrent major depressive disorder (CMS/HCC)    Lactic acidosis resolved    Hyponatremia resolving    Hypokalemia    Elevated bilirubin    Leukocytosis    Severe anemia due to GI losses (source unknown)    E. coli UTI (urinary tract infection)    Acute renal failure (CMS/HCC)    Shock liver improved    Hypophosphatemia    Mild to moderate mitral stenosis on echocardiogram    Alcoholic cirrhosis (CMS/HCC)            Impression:  "OLIGURIC ABDI WITH WORSE GFR. ANEMIA. HYPOKALEMIA AND HYPOPHOSPHATEMIA ARE BETTER. HYPOTENSION ON LOW DOSE PRESSORS            Recommendations: START DIALYSIS. HAVE IR PLACE A THD CATH.      Michael English MD  06/07/21  09:28 EDT          Electronically signed by Michael English MD at 06/07/21 0907     Orestes, Sofya Garcia MD at 06/06/21 1621          GI Daily Progress Note  Subjective:    Chief Complaint:  Increased diarrhea/confusion     Patient in restraints today.  She nods and answers some questions.  Patient has had increased diarrhea  Patient denies abdominal pain.    Objective:    /57 (BP Location: Left arm, Patient Position: Lying)   Pulse 96   Temp 98.1 °F (36.7 °C) (Oral)   Resp 24   Ht 165.1 cm (65\")   Wt 93 kg (205 lb)   SpO2 96%   BMI 34.11 kg/m²     Physical Exam  Constitutional:       Appearance: She is ill-appearing.      Interventions: She is restrained.   HENT:      Head: Normocephalic.      Comments: Feeding tube in place  Eyes:      General: Gaze aligned appropriately. Scleral icterus present.   Cardiovascular:      Rate and Rhythm: Normal rate and regular rhythm.   Pulmonary:      Breath sounds: Decreased air movement present.   Abdominal:      General: Abdomen is protuberant. Bowel sounds are normal. There is distension.      Palpations: Abdomen is soft.      Tenderness: There is no abdominal tenderness.      Hernia: A hernia is present. Hernia is present in the umbilical area.   Genitourinary:     Comments: Nunes and stool system  Musculoskeletal:      Right lower leg: Edema present.      Left lower leg: Edema present.         Lab  Lab Results   Component Value Date    WBC 25.86 (H) 06/06/2021    HGB 10.6 (L) 06/06/2021    HGB 11.0 (L) 06/05/2021    HGB 10.6 (L) 06/04/2021    MCV 97.8 (H) 06/06/2021     06/06/2021    INR 1.79 (H) 06/05/2021    INR 3.34 (H) 06/03/2021    INR 3.68 (H) 06/02/2021    INR 2.82 (H) 06/01/2021       Lab Results   Component Value Date    " GLUCOSE 112 (H) 06/06/2021    BUN 57 (H) 06/06/2021    CREATININE 4.22 (H) 06/06/2021    EGFRIFNONA 11 (L) 06/06/2021    EGFRIFAFRI  06/06/2021      Comment:      <15 Indicative of kidney failure.    BCR 13.5 06/06/2021     (L) 06/06/2021    K 3.1 (L) 06/06/2021    CO2 21.0 (L) 06/06/2021    CALCIUM 8.7 06/06/2021    ALBUMIN 2.70 (L) 06/06/2021    ALKPHOS 247 (H) 06/06/2021    BILITOT 10.6 (H) 06/06/2021    BILIDIR 8.6 (H) 06/06/2021     (H) 06/06/2021     (H) 06/06/2021       Assessment:      Sepsis and septic shock due to Gram negative bacteria.  Urinary tract origin (CMS/HCC)    Alcoholism (CMS/HCC)    Moderate episode of recurrent major depressive disorder (CMS/HCC)    Lactic acidosis resolved    Hyponatremia resolving    Hypokalemia    Elevated bilirubin    Leukocytosis    Severe anemia due to GI losses (source unknown)    E. coli UTI (urinary tract infection)    Acute renal failure (CMS/HCC)    Shock liver improved    Hypophosphatemia    Mild to moderate mitral stenosis on echocardiogram    Alcoholic cirrhosis (CMS/HCC)  1.  Transaminitis, improving  2.  BRITO cirrhosis              -Meld-Na: 36 points.               -Child Carrera: C  3.  Alcohol abuse  4.  Hyponatremia in setting of cirrhosis  5.  Acute kidney injury, creatinine 4.31-GFR 11  6. Metabolic acidosis  7. Status post sleeve gastrectomy, cholecystectomy  8. Coagulopathy  9. Malnutrition      Plan:    Discussed case with Dr. Capps,  Agree that patient does not need daily ammonia level checked.  Patient with increased diarrhea and may hold lactulose if excessive. Patient following some commands and trying to answer questions    Patient should still be clinically evaluated for encephalopathy and treated if possible.  Noted ID just saw patient and diagnosed patient with Clostridium septicum.  Appreciate their help.  Patient C dif PCR negative.  I do not recommend colonoscopy at this point.      Continue nutrition, fluids, and  "pressors if needed for her sepsis    Noted patient had abdominal distention will order portable ultrasound (non urgent) to check for ascites          Sofya Carlisle MD  06/06/21  16:21 EDT      Electronically signed by Sofya Carlisle MD at 06/06/21 9244     Mikael Vicente MD at 06/06/21 0952             LOS: 5 days    Patient Care Team:  Renetta Santizo MD as PCP - General (Internal Medicine)    Chief Complaint: UTI sepsis  54-year-old, alcohol abuse, cirrhosis of liver, morbid obesity, hypertension, history of sleeve gastrectomy, lap band, cholecystectomy.  Admitted 6/1/2020 21 pound at home laying down on the ground, hypoglycemic hypotensive.  On arrival sodium 147 creatinine 4.48 bilirubin 6.7.  UA shows positive bacteria patient admitted IV antibiotic started in.  Also required IV pressor.  Subjective     Interval History:   Critically ill patient multiple medical problems.  Hypotension on IV norepinephrine.  Confusion slightly improved  Diarrhea with the use of lactulose       Review of Systems:    Review of systems could not be obtained due to  patient confusion. emergent nature of case.    Objective     Vital Sign Min/Max for last 24 hours  Temp  Min: 97.4 °F (36.3 °C)  Max: 97.9 °F (36.6 °C)   BP  Min: 89/50  Max: 124/58   Pulse  Min: 90  Max: 96   Resp  Min: 22  Max: 28   SpO2  Min: 74 %  Max: 99 %   No data recorded   No data recorded     Flowsheet Rows      First Filed Value   Admission Height  165.1 cm (65\") Documented at 06/01/2021 1730   Admission Weight  93 kg (205 lb) Documented at 06/01/2021 1730          No intake/output data recorded.  I/O last 3 completed shifts:  In: 3985 [I.V.:1274; Other:50; NG/GT:1911; IV Piggyback:750]  Out: 705 [Urine:705]    Physical Exam:  General Appearance: Jaundice morbidly obese  female, comfortable in bed  Eyes: PER, EOMI. icteric  Neck: Supple no JVD.  Lungs: Clear auscultation, no rales rhonchi's, equal chest movement, nonlabored.  Heart: No gallop, " murmur, rub, RRR.  Abdomen: Soft, nontender, positive bowel sounds, no organomegaly.  Extremities: Positive edema trace, no cyanosis.  Neuro: Cannot be evaluated.  In 4 restraint  Skin spider angiomas noted.  Skin is warm and dry.  Skin jaundice   Nunes catheter in place.  Dark-colored urine  Rectal tube in place.    WBC WBC   Date Value Ref Range Status   06/06/2021 25.86 (H) 3.40 - 10.80 10*3/mm3 Preliminary   06/05/2021 23.15 (H) 3.40 - 10.80 10*3/mm3 Final   06/04/2021 13.29 (H) 3.40 - 10.80 10*3/mm3 Final      HGB Hemoglobin   Date Value Ref Range Status   06/06/2021 10.6 (L) 12.0 - 15.9 g/dL Preliminary   06/05/2021 11.0 (L) 12.0 - 15.9 g/dL Final   06/04/2021 10.6 (L) 12.0 - 15.9 g/dL Final   06/03/2021 10.2 (L) 12.0 - 15.9 g/dL Final   06/03/2021 10.5 (L) 12.0 - 15.9 g/dL Final      HCT Hematocrit   Date Value Ref Range Status   06/06/2021 30.8 (L) 34.0 - 46.6 % Preliminary   06/05/2021 31.6 (L) 34.0 - 46.6 % Final   06/04/2021 31.1 (L) 34.0 - 46.6 % Final   06/03/2021 30.1 (L) 34.0 - 46.6 % Final   06/03/2021 30.4 (L) 34.0 - 46.6 % Final      Platlets No results found for: LABPLAT   MCV MCV   Date Value Ref Range Status   06/06/2021 97.8 (H) 79.0 - 97.0 fL Preliminary   06/05/2021 96.9 79.0 - 97.0 fL Final   06/04/2021 98.1 (H) 79.0 - 97.0 fL Final          Sodium Sodium   Date Value Ref Range Status   06/06/2021 134 (L) 136 - 145 mmol/L Final   06/05/2021 133 (L) 136 - 145 mmol/L Final   06/04/2021 129 (L) 136 - 145 mmol/L Final   06/04/2021 125 (L) 136 - 145 mmol/L Final   06/03/2021 122 (L) 136 - 145 mmol/L Final   06/03/2021 120 (L) 136 - 145 mmol/L Final      Potassium Potassium   Date Value Ref Range Status   06/06/2021 3.1 (L) 3.5 - 5.2 mmol/L Final   06/05/2021 2.9 (L) 3.5 - 5.2 mmol/L Final   06/04/2021 3.3 (L) 3.5 - 5.2 mmol/L Final     Comment:     Specimen hemolyzed.  Results may be affected.   06/04/2021 2.9 (L) 3.5 - 5.2 mmol/L Final      Chloride Chloride   Date Value Ref Range Status      06/06/2021 99 98 - 107 mmol/L Final   06/05/2021 95 (L) 98 - 107 mmol/L Final   06/04/2021 92 (L) 98 - 107 mmol/L Final   06/04/2021 88 (L) 98 - 107 mmol/L Final      CO2 CO2   Date Value Ref Range Status   06/06/2021 21.0 (L) 22.0 - 29.0 mmol/L Final   06/05/2021 22.0 22.0 - 29.0 mmol/L Final   06/04/2021 21.0 (L) 22.0 - 29.0 mmol/L Final   06/04/2021 22.0 22.0 - 29.0 mmol/L Final      BUN BUN   Date Value Ref Range Status   06/06/2021 57 (H) 6 - 20 mg/dL Final   06/05/2021 39 (H) 6 - 20 mg/dL Final   06/04/2021 34 (H) 6 - 20 mg/dL Final   06/04/2021 28 (H) 6 - 20 mg/dL Final      Creatinine Creatinine   Date Value Ref Range Status   06/06/2021 4.22 (H) 0.57 - 1.00 mg/dL Final   06/05/2021 4.31 (H) 0.57 - 1.00 mg/dL Final   06/04/2021 4.31 (H) 0.57 - 1.00 mg/dL Final   06/04/2021 4.69 (H) 0.57 - 1.00 mg/dL Final      Calcium Calcium   Date Value Ref Range Status   06/06/2021 8.7 8.6 - 10.5 mg/dL Final   06/05/2021 8.9 8.6 - 10.5 mg/dL Final   06/04/2021 9.2 8.6 - 10.5 mg/dL Final   06/04/2021 9.2 8.6 - 10.5 mg/dL Final      PO4 No results found for: CAPO4   Albumin Albumin   Date Value Ref Range Status   06/06/2021 2.70 (L) 3.50 - 5.20 g/dL Final   06/05/2021 2.90 (L) 3.50 - 5.20 g/dL Final   06/04/2021 3.10 (L) 3.50 - 5.20 g/dL Final   06/04/2021 3.30 (L) 3.50 - 5.20 g/dL Final      Magnesium Magnesium   Date Value Ref Range Status   06/05/2021 1.9 1.6 - 2.6 mg/dL Final   06/04/2021 2.3 1.6 - 2.6 mg/dL Final      Uric Acid No results found for: URICACID        Results Review:     I reviewed the patient's new clinical results.    famotidine, 20 mg, Intravenous, Daily  folic acid (FOLVITE) IVPB, 1 mg, Intravenous, Daily  insulin regular, 0-7 Units, Subcutaneous, Q6H  lactulose, 10 g, Nasogastric, TID  Linezolid, 600 mg, Intravenous, Q12H  midodrine, 5 mg, Nasogastric, TID AC  multivitamin and minerals, 15 mL, Nasogastric, Daily  piperacillin-tazobactam, 3.375 g, Intravenous, Q12H  rifAXIMin, 400 mg, Nasogastric,  Q8H  saccharomyces boulardii, 500 mg, Oral, BID  sodium chloride, 10 mL, Intravenous, Q12H      norepinephrine, 0.02-0.3 mcg/kg/min, Last Rate: 0.08 mcg/kg/min (06/05/21 1037)  Pharmacy Consult,   sodium chloride 0.9 % with KCl 20 mEq, 75 mL/hr, Last Rate: 75 mL/hr (06/05/21 1201)        Medication Review: Reviewed    Assessment/Plan       Sepsis and septic shock due to Gram negative bacteria.  Urinary tract origin (CMS/HCC)    Alcoholism (CMS/HCC)    Moderate episode of recurrent major depressive disorder (CMS/HCC)    Lactic acidosis resolved    Hyponatremia resolving    Hypokalemia    Elevated bilirubin    Leukocytosis    Severe anemia due to GI losses (source unknown)    E. coli UTI (urinary tract infection)    Acute renal failure (CMS/HCC)    Shock liver improved    Hypophosphatemia    Mild to moderate mitral stenosis on echocardiogram    Alcoholic cirrhosis (CMS/HCC)      Assessment   1.  Acute renal failure: Nonoliguric creatinine about the same, hypotensive, liver failure likely hepatorenal syndrome  2.  Gram-negative septic shock:  3.  UTI.  4.  Abnormal liver enzymes secondary to shock.  Nodular cirrhosis on CT  5.  Hyponatremia: Improved  6.  Severe anemia.  7.  Alcohol abuse  8.  Hypokalemia: Remain low  9.  Hypophosphatemia: Most likely secondary to alcohol abuse and refeeding syndrome: Need replacement  10.  Leukocytosis: Getting worse, on antibiotics.  Plan  BUN/creatinine remains same, nonoliguric.  No dialysis needed  Hyponatremia improved.  Will monitor  Hypophosphatemia will need more replacement.  Hypokalemia replacement ordered   Replace phosphorus IV 21 mEq along with K ordered  Lactulose is on hold now due to significant diarrhea  Avoid nephrotoxic medications.  Keep systolic blood pressure greater than 100.  Check volume status.  Check labs in the morning.  Daily evaluation for renal replacement therapy will be done.  Adjust medication for the new GFR.  Case discussed with the medical staff  "and Dr. Capps taking care of the patient.  High risk patient with multiple medical problems    Mikael Vicente MD  06/06/21  08:35 EDT         Electronically signed by Mikael Vicente MD at 06/06/21 0840     Candido Capps MD at 06/06/21 0639          Intensivist Note     6/6/2021  Hospital Day: 5  * No surgery found *  ICU Stays Timeline            Hospital Admission: 06/01/21 1712 - Current  ICU stays: 1      In Date/Time Event Department ICU Stay Duration     06/01/21 1712 Admission  [facility]      06/01/21 2317 Transfer In  [facility] 4 days 7 hours 22 minutes             Ms. Laura Jang, 56 y.o. female is followed for:    Sepsis and septic shock due to Gram negative bacteria.  Urinary tract origin (CMS/HCC)    E. coli UTI (urinary tract infection)    Acute renal failure (CMS/HCC)    Shock liver improved    Alcoholic cirrhosis (CMS/HCC)    Lactic acidosis resolved    Hyponatremia resolving    Hypokalemia    Elevated bilirubin    Hypophosphatemia    Severe anemia due to GI losses (source unknown)    Alcoholism (CMS/HCC)    Moderate episode of recurrent major depressive disorder (CMS/HCC)    Leukocytosis    Mild to moderate mitral stenosis on echocardiogram       SUBJECTIVE     55-year-old white female non-smoker with a history of significant alcoholism, hypertension, obesity and both sleeve gastrectomy, lap band, and cholecystectomy. Has significant chronic anxiety/depression, and DDD.  Apparently lost her job 4 weeks ago and has had increased alcohol intake.  With the help of her therapist, 2 weeks ago began to wean off alcohol and began to note increasing weakness and lethargy.  Was also apparently begun on unknown \"new medication\".  On 6/1/2021 a friend who had not talked to her in several days went by patient's house and found her on the floor surrounded by beer cans with fecal incontinence.  Upon EMS arrival glucose was in the 30s and patient was hypotensive with a SBP in the 80s.  D50 " led to improvement in mentation and she was transferred to [facility] ER.  In the ER hematocrit 25, WBC 12.97 with a left shift, procalcitonin 3.31, platelets 183, sodium 107, potassium 3.0, chloride 64, bicarb 16, BUN 18, creatinine 4.48, alk phos 139, , AST 2,211, bilirubin 6.7, amylase 56, INR 2.82, serum ammonia 58, and albumin 3.0.  Cortisol was 65. ProBNP 3,736.  ABGs revealed pH 7.42, PCO2 24, PO2 80 on room air but she had a lactic acid of 9.2.. Urinalysis revealed TNTC  WBCs and 4+ bacteria.  Patient was cultured up and received a dose of Zosyn but never received vancomycin.  Was also begun on fluids and pressors.  By 6/2/2021 patient seemed to feel better with fluids, pressors, and antimicrobial therapy and lactic acid level decreased.  Mentation initially seemed to improve but when hematocrit dropped to 22 she required transfusion with PRBC which was associated with increased serum ammonia and increased confusion.  Urine cultures returned growing a sensitive E. coli and she was continued on Zosyn (a positive flora in her blood culture was felt to be contaminant). Nasal swab PCR however was positive for MRSA. Renal function remained compromised  and nephrology was asked to see.  Patient had remained a full code per her estranged , but after discussions between the  and the patient's daughter who is a physician practicing in [place], decisions were made to proceed with no CPR and DNI but continued limited support.    Interval history: Still requiring norepinephrine for blood pressure support and still with low UOP (450 cc out over 24 hours).  Creatinine continues to slowly drop and is now down to 4.22 (from 4.31), but BUN increased from 39 to 57 despite fluid balance being approximately 13 L positive since admission.  No arrhythmias noted.  Gas exchange remains very good with O2 sats of 93% on room air.  Continues to be placed on BiPAP at night because of shallow rapid respirations and an  "attempt to rest her diaphragms.  Still with suboptimal cough.  Patient remains afebrile but WBC has increased to 25.86 despite broad-spectrum coverage with Zyvox and Zosyn and (to cover positive MRSA PCR screen and potential pneumonia as well as Zosyn to cover E. coli UTI).  Transaminases continue to drop although bilirubin remains unchanged at 10.  As noted INR has returned to 1.79 (from a high of 3.68).  Continues to tolerate enteral nutrition at goal (Impact peptide 50 cc an hour).  Unfortunately encephalopathy is about the same.  She will follow commands with loud verbal as well as tactile stimulation, but remains confused/encephalopathic and difficult to understand.  Remains on lactulose (with significant diarrhea requiring FMS) and rifaximin but todays serum ammonia increased from 56, to 120.         ROS: Unable to obtain due to encephalopathy.    The patient's relevant PMH, PSH, FH, and SH were reviewed and updated in Epic as appropriate. Allergies and Medications reviewed.    OBJECTIVE     /70   Pulse 94   Temp 98.1 °F (36.7 °C) (Oral)   Resp 24   Ht 165.1 cm (65\")   Wt 93 kg (205 lb)   SpO2 98%   BMI 34.11 kg/m²     Presently on room air      Flowsheet Rows      First Filed Value   Admission Height  165.1 cm (65\") Documented at 06/01/2021 1730   Admission Weight  93 kg (205 lb) Documented at 06/01/2021 1730        Intake & Output (last day)       06/05 0701 - 06/06 0700    I.V. (mL/kg) 974 (10.5)    Other 50    NG/GT 1291    IV Piggyback 400    Total Intake(mL/kg) 2715 (29.2)    Urine (mL/kg/hr) 450 (0.2)    Stool 0    Total Output 450    Net +2265         Stool Unmeasured Occurrence 1 x          Exam:  General Exam:  Chronically ill obese white female slightly propped up in bed.  HEENT: Pupils equal and reactive.  NG tube in place  Neck:                          Supple, no JVD, thyromegaly, or adenopathy.  Left IJ line in place  Lungs: Clear to auscultation and percussion anteriorly and " posteriorly.  Cardiovascular: RRR without murmurs or gallops.  HR 98 bpm  Abdomen: Soft nontender without organomegaly or masses.  Obese/protuberant   and rectal: Nunes catheter in place  Extremities: No cyanosis or clubbing but mild lower extremity edema.  Neurologic:                 Symmetric strength diffusely weak. No focal deficits but remains encephalopathic requiring restraints.  Will follow simple commands    CXR: No film today    INFUSIONS  norepinephrine, 0.02-0.3 mcg/kg/min, Last Rate: 0.03 mcg/kg/min (06/06/21 1825)  sodium chloride 0.9 % with KCl 20 mEq, 75 mL/hr, Last Rate: 75 mL/hr (06/05/21 1201)        Results from last 7 days   Lab Units 06/06/21 0342 06/05/21 0321 06/04/21 0317   WBC 10*3/mm3 25.86* 23.15* 13.29*   HEMOGLOBIN g/dL 10.6* 11.0* 10.6*   HEMATOCRIT % 30.8* 31.6* 31.1*   PLATELETS 10*3/mm3 165 179 162     Results from last 7 days   Lab Units 06/06/21 0342 06/05/21 0321   SODIUM mmol/L 134* 133*   POTASSIUM mmol/L 3.1* 2.9*   CHLORIDE mmol/L 99 95*   CO2 mmol/L 21.0* 22.0   BUN mg/dL 57* 39*   CREATININE mg/dL 4.22* 4.31*   GLUCOSE mg/dL 112* 122*   CALCIUM mg/dL 8.7 8.9     Results from last 7 days   Lab Units 06/06/21 0342 06/05/21 0321 06/04/21  1755 06/04/21 0317 06/03/21  0827   MAGNESIUM mg/dL  --  1.9  --  2.3 2.4   PHOSPHORUS mg/dL 2.0* 1.6* 0.6* 0.5* 1.6*     Results from last 7 days   Lab Units 06/06/21 0342 06/05/21 0321 06/04/21 0317   ALK PHOS U/L 247* 231* 147*   BILIRUBIN mg/dL 10.6* 10.1* 10.5*   BILIRUBIN DIRECT mg/dL 8.6*  --  7.5*   ALT (SGPT) U/L 260* 349* 436*   AST (SGOT) U/L 488* 971* 1,690*       Lab Results   Component Value Date    SEDRATE 5 06/03/2021     No results found for: BNP  Lab Results   Component Value Date    CKTOTAL 668 (H) 06/01/2021    TROPONINT 0.023 06/01/2021     Lab Results   Component Value Date    TSH 3.750 06/01/2021    TSH 3.750 06/01/2021     Lab Results   Component Value Date    LACTATE 2.0 06/06/2021     Lab Results    Component Value Date    CORTISOL 65.14 06/01/2021       Results from last 7 days   Lab Units 06/05/21  0312 06/04/21  0405 06/02/21  0634   PH, ARTERIAL pH units 7.447 7.456* 7.421   PCO2, ARTERIAL mm Hg 32.1* 31.6* 23.9*   PO2 ART mm Hg 68.5* 96.5 80.5*   HCO3 ART mmol/L 22.1 22.2 15.5*   FIO2 % 21 28 21         I reviewed the patient's results, images and medication.    Assessment/Plan   ASSESSMENT        Sepsis and septic shock due to Gram negative bacteria.  Urinary tract origin (CMS/HCC)    E. coli UTI (urinary tract infection)    Acute renal failure (CMS/HCC)    Shock liver improved    Alcoholic cirrhosis (CMS/HCC)    Lactic acidosis resolved    Hyponatremia resolving    Hypokalemia    Elevated bilirubin    Hypophosphatemia    Severe anemia due to GI losses (source unknown)    Alcoholism (CMS/HCC)    Moderate episode of recurrent major depressive disorder (CMS/formerly [facility])    Leukocytosis    Mild to moderate mitral stenosis on echocardiogram      DISCUSSION: Remains critically ill.  Still on pressors to maintain blood pressure.  Although creatinine improved UOP is low and BUN starting to climb (despite fluids).  Encephalopathy persists.  Serum ammonia level increased significantly today for unclear reasons.  Because of profound diarrhea lactulose is being held although she remains on rifaximin.  WBC remains persistently high despite Zyvox and Zosyn and we have no additional focus of infection (E. coli UTI and positive MRSA nasal swab).  In addition procalcitonin remains elevated at 2.1.  C. difficile toxin was negative.    PLAN     1.  Abdominal ultrasound and if significant ascites present would tap  2.  Continue present antimicrobial coverage per ID  3.  Although triple-lumen line only 4 days old, if WBC does not improve, would consider replacing it  4.  Potassium and phosphorus replacement  5.  Continue rifaximin  6.  Present IV fluids will end 11 AM tomorrow tomorrow  7.  Continue enteral nutrition but probably  needs some fiber  8.  Continue to follow renal function closely    Plan of care and goals reviewed with multidisciplinary team at daily rounds.    I discussed the patient's findings and my recommendations with nursing staff and consulting provider    Patient is critically ill due to innumerable problems as listed above and has a high risk of life-threatening decline in condition.  They require continuous monitoring and frequent reassessment of condition for adjustment of management in order to lessen risk.  I visited the patient's bedside and interacted with nurse numerous times today.    Time spent Critical care 35 min (It does not include procedure time).    Electronically signed by Candido Capps MD, 06/06/21, 6:39 AM EDT.   Pulmonary / Critical care medicine     Electronically signed by Candido Capps MD, 06/06/21, 6:39 AM EDT.    Electronically signed by Candido Capps MD at 06/06/21 2041     Candido Capps MD at 06/05/21 1319          Intensivist Note     6/5/2021  Hospital Day: 4  * No surgery found *  ICU Stays Timeline            Hospital Admission: 06/01/21 1712 - Current  ICU stays: 1      In Date/Time Event Department ICU Stay Duration     06/01/21 1712 Admission  [facility]      06/01/21 2317 Transfer In  [facility] 3 days 14 hours 2 minutes             Ms. Laura Jang, 55 y.o. female is followed for:    Sepsis and septic shock due to Gram negative bacteria.  Urinary tract origin (CMS/HCC)    E. coli UTI (urinary tract infection)    Acute renal failure (CMS/HCC)    Shock liver improved    Alcoholic cirrhosis (CMS/HCC)    Lactic acidosis resolved    Hyponatremia resolving    Hypokalemia    Elevated bilirubin    Hypophosphatemia    Severe anemia due to GI losses (source unknown)    Alcoholism (CMS/HCC)    Moderate episode of recurrent major depressive disorder (CMS/HCC)    Leukocytosis    Mild to moderate mitral stenosis on echocardiogram       SUBJECTIVE     55-year-old white female  "non-smoker with a history of significant alcoholism, hypertension, obesity and both sleeve gastrectomy, lap band, and cholecystectomy. Has significant chronic anxiety/depression, and DDD.  Apparently lost her job 4 weeks ago and has had increased alcohol intake.  With the help of her therapist, 2 weeks ago began to wean off alcohol and began to note increasing weakness and lethargy.  Was also apparently begun on unknown \"new medication\".  On 6/1/2021 a friend who had not talked to her in several days went by patient's house and found her on the floor surrounded by beer cans with fecal incontinence.  Upon EMS arrival glucose was in the 30s and patient was hypotensive with a SBP in the 80s.  D50 led to improvement in mentation and she was transferred to [facility] ER.  In the ER hematocrit 25, WBC 12.97 with a left shift, procalcitonin 3.31, platelets 183, sodium 107, potassium 3.0, chloride 64, bicarb 16, BUN 18, creatinine 4.48, alk phos 139, , AST 2,211, bilirubin 6.7, amylase 56, INR 2.82, serum ammonia 58, and albumin 3.0.  Cortisol was 65. ProBNP 3,736.  ABGs revealed pH 7.42, PCO2 24, PO2 80 on room air but she had a lactic acid of 9.2.. Urinalysis revealed TNTC  WBCs and 4+ bacteria.  Patient was cultured up and received a dose of Zosyn but never received vancomycin.  Was also begun on fluids and pressors.  By 6/2/2021 patient seemed to feel better with fluids, pressors, and antimicrobial therapy and lactic acid level decreased.  Mentation initially seemed to improve but when hematocrit dropped to 22 she required transfusion with PRBC which was associated with increased serum ammonia and increased confusion.  Urine cultures returned growing a sensitive E. coli and she was continued on Zosyn (a positive flora in her blood culture was felt to be contaminant). Nasal swab PCR however was positive for MRSA. Renal function remained compromised  and nephrology was asked to see.  Patient had remained a full code per her " "estranged , but after discussions between the  and the patient's daughter who is a physician practicing in [place], decisions were made to proceed with no CPR and DNI but continued limited support.    Interval history: Patient still with marginal blood pressure requiring norepinephrine support but dose has been decreased.  UOP still marginal with only approximately 250 cc per 12 hours shift, but no worsening in BUN/creatinine (39/4.31 respectively today).  Total bilirubin remains steady at 10.1 for the last 3 days but transaminases continue to slowly drop.  Hematocrit remains stable for the last 3 days since 2 units PRBC transfused 6/2/2021. INR improved today to 1.79.  Gas exchange continues to remain good with O2 sats of 94% on room air.  Patient continues to be placed on BiPAP at night to recruit alveoli and maintain adequate lung volumes.  Nutritionally patient is on Impact peptide 1.5 and remains at goal and seems to be tolerating it well.  Diarrhea persist and she has an FMS in place (still on lactulose and rifaximin).  Confusional state persists but she seems slightly improved today.  Although T-max only 99.3 patient's WBC increased from 13.29 yesterday, to 23.15 today despite coverage with linezolid and Zosyn.  Patient's CXR today shows almost complete clearing in the right base with improved aeration in the left base.         ROS: Unable to obtain due to confusional state    The patient's relevant PMH, PSH, FH, and SH were reviewed and updated in Epic as appropriate. Allergies and Medications reviewed.    OBJECTIVE     /67   Pulse 91   Temp 97.4 °F (36.3 °C)   Resp 22   Ht 165.1 cm (65\")   Wt 93 kg (205 lb)   SpO2 97%   BMI 34.11 kg/m²     Presently on room air         Flowsheet Rows      First Filed Value   Admission Height  165.1 cm (65\") Documented at 06/01/2021 1730   Admission Weight  93 kg (205 lb) Documented at 06/01/2021 1730        Intake & Output (last day)       06/04 " 0701 - 06/05 0700 06/05 0701 - 06/06 0700    I.V. (mL/kg) 900.6 (9.7)     Other 40     NG/GT 1174     IV Piggyback 1150     Total Intake(mL/kg) 3264.6 (35.1)     Urine (mL/kg/hr) 505 (0.2) 120 (0.1)    Stool 0 0    Total Output 505 120    Net +2759.6 -120          Stool Unmeasured Occurrence 4 x 1 x          Exam:  General Exam:  Debilitated, chronically ill, obese, confused white female appearing far older than stated age.  HEENT: Pupils equal and reactive.  Nasoduodenal tube in place  Neck:                          Supple, no JVD, thyromegaly, or adenopathy.  Left IJ line in place  Lungs: Clear to auscultation and percussion anteriorly and posteriorly.  Cardiovascular: Regular rate and rhythm without murmurs or gallops.  Abdomen: Soft nontender without organomegaly or masses.  Obese and presumed underlying ascites   and rectal: Nunes catheter in place.  FMS in place  Extremities: No cyanosis or clubbing edema.  Neurologic:                 Symmetric strength without focal deficits but diffusely weak.  Tries to answer questions but delirious at times, pulling at lines etc.  Is not however combative and will follow commands.  Is oriented only to person    Chest X-Ray: Mild cardiomegaly.  Right lung now completely clear.  Minimal opacity at the left base with better aeration when compared to yesterday.  No consolidative infiltrates.    INFUSIONS  norepinephrine, 0.02-0.3 mcg/kg/min, Last Rate: 0.08 mcg/kg/min (06/05/21 1037)  sodium chloride 0.9 % with KCl 20 mEq, 75 mL/hr, Last Rate: 75 mL/hr (06/05/21 1201)        Results from last 7 days   Lab Units 06/05/21  0321 06/04/21  0317 06/03/21  2324 06/03/21  0702   WBC 10*3/mm3 23.15* 13.29*  --  10.95*   HEMOGLOBIN g/dL 11.0* 10.6* 10.2* 9.7*   HEMATOCRIT % 31.6* 31.1* 30.1* 28.5*   PLATELETS 10*3/mm3 179 162  --  172     Results from last 7 days   Lab Units 06/05/21  0321 06/04/21  1755   SODIUM mmol/L 133* 129*   POTASSIUM mmol/L 2.9* 3.3*   CHLORIDE mmol/L 95* 92*    CO2 mmol/L 22.0 21.0*   BUN mg/dL 39* 34*   CREATININE mg/dL 4.31* 4.31*   GLUCOSE mg/dL 122* 119*   CALCIUM mg/dL 8.9 9.2     Results from last 7 days   Lab Units 06/05/21  0321 06/04/21  1755 06/04/21  0317 06/03/21  0827   MAGNESIUM mg/dL 1.9  --  2.3 2.4   PHOSPHORUS mg/dL 1.6* 0.6* 0.5* 1.6*     Results from last 7 days   Lab Units 06/05/21  0321 06/04/21  0317 06/03/21  0827   ALK PHOS U/L 231* 147* 101   BILIRUBIN mg/dL 10.1* 10.5* 10.0*   BILIRUBIN DIRECT mg/dL  --  7.5*  --    ALT (SGPT) U/L 349* 436* 449*   AST (SGOT) U/L 971* 1,690* 2,016*       Lab Results   Component Value Date    SEDRATE 5 06/03/2021     No results found for: BNP  Lab Results   Component Value Date    CKTOTAL 668 (H) 06/01/2021    TROPONINT 0.023 06/01/2021     Lab Results   Component Value Date    TSH 3.750 06/01/2021    TSH 3.750 06/01/2021     Lab Results   Component Value Date    LACTATE 2.8 (C) 06/04/2021     Lab Results   Component Value Date    CORTISOL 65.14 06/01/2021       Results from last 7 days   Lab Units 06/05/21  0312 06/04/21  0405 06/02/21  0634   PH, ARTERIAL pH units 7.447 7.456* 7.421   PCO2, ARTERIAL mm Hg 32.1* 31.6* 23.9*   PO2 ART mm Hg 68.5* 96.5 80.5*   HCO3 ART mmol/L 22.1 22.2 15.5*   FIO2 % 21 28 21         I reviewed the patient's results, images and medication.    Assessment/Plan   ASSESSMENT        Sepsis and septic shock due to Gram negative bacteria.  Urinary tract origin (CMS/HCC)    E. coli UTI (urinary tract infection)    Acute renal failure (CMS/HCC)    Shock liver improved    Alcoholic cirrhosis (CMS/HCC)    Lactic acidosis resolved    Hyponatremia resolving    Hypokalemia    Elevated bilirubin    Hypophosphatemia    Severe anemia due to GI losses (source unknown)    Alcoholism (CMS/HCC)    Moderate episode of recurrent major depressive disorder (CMS/HCC)    Leukocytosis    Mild to moderate mitral stenosis on echocardiogram      DISCUSSION: No dramatic improvement.  Still hypotensive requiring  norepinephrine infusion.  Still with low UOP and persistent elevation of creatinine.  Significant increase in WBC despite broad-spectrum antimicrobial coverage (although pro calcitonin continues to decline).  Transaminases improved but bilirubin remains markedly elevated.  Encephalopathy persists despite lactulose and rifaximin.  Still with significant watery diarrhea and although I normally would not be concerned because of her ongoing tube feeding and lactulose I think that with persistent elevation of procalcitonin and dramatic increase in WBC we have to consider C. difficile colitis.  Another alternative would be spontaneous peritonitis related to her cirrhosis but that should be covered    PLAN     1.  Repeat WBC in a.m.  2.  Stool for C. difficile toxin (I do not care if she is receiving lactulose and tube feeding)  3.  If WBC continues to increase or patient spikes a fever, will change Zosyn to Merrem   4.  Obtain paracentesis if WBC remains increased  5.  Continue fluids and pressors  6.  Add midodrine to see if will allow norepinephrine to be weaned    Plan of care and goals reviewed with multidisciplinary team at daily rounds.    I discussed the patient's findings and my recommendations with patient and nursing staff    Patient is critically ill due to innumerable problems as listed in discussion and has a high risk of life-threatening decline in condition.  They require continuous monitoring and frequent reassessment of condition for adjustment of management in order to lessen risk.  I visited the patient's bedside and interacted with nurse numerous times today.    Time spent Critical care 35 min (It does not include procedure time).    Electronically signed by Candido Capps MD, 06/05/21, 1:19 PM EDT.   Pulmonary / Critical care medicine       Electronically signed by Candido Capps MD at 06/05/21 9106          Consult Notes (last 48 hours) (Notes from 06/05/21 1304 through 06/07/21 1304)         Gianni Monahan MD at 06/06/21 0958      Consult Orders    1. Inpatient Infectious Diseases Consult [[id number]] ordered by Candido Capps MD at 06/06/21 0851               INFECTIOUS DISEASE CONSULT/INITIAL HOSPITAL VISIT    Laura Jang  1965  [id number]    Date of Consult: 6/6/2021    Admission Date: 6/1/2021      Requesting Provider: No ref. provider found  Evaluating Physician: Gianni Monahan MD    Reason for Consultation: Clostridium bacteremia    History of present illness:    Patient is a 56 y.o. female, with PMH alcoholism, cirrhosis, seen today for Clostridium bacteremia.  She was brought to the ED after being found down at home, surrounded by beer cans, and fecal incontinence.  She lost her job 4 weeks ago, and she increased her alcohol intake, but several weeks ago, with the help of her therapist, was trying to wean her consumption.  Admitting labs with , proBNP 3736, lAC 9.7, Scr 4.48, , AST 2211, , TBili, 6.7, WBC 00939 PCT 3.3,  UA TNTC WBCS, urine culture now with Ecoli. MRSA PCR positive.   CXR with increased vascular congestion, no overt edema, or pleural effusions. HCT no acute intracranial findings. Abdominal CT with severe hepatic steatosis, liver appears mildly nodular suggesting hepatic cirrhosis, increased attenuation in the mesenteric fat below aortic bifurcation, can be seen in mesenteric panniculitis, hepatic cirrhosis. Blood culture now positive for Clostridium species. She is currently on Zosyn, Rifaximin, and Linezolid.  She is currently confused/somnolent and cannot provide any reliable review of systems.    Past Medical History:   Diagnosis Date   • Alcohol abuse    • Anxiety    • Arthritis    • Blistering of skin 3/1/2020   • Degenerative disc disease, lumbar    • Depression    • Hypertension        Past Surgical History:   Procedure Laterality Date   • APPENDECTOMY     • BARIATRIC SURGERY      2005, gastric sleeve   • CARPAL TUNNEL RELEASE Bilateral      07/2020   • CHOLECYSTECTOMY     • HERNIA REPAIR      2014   • SPINE SURGERY      cyst removal       Family History   Problem Relation Age of Onset   • Arthritis Mother    • Cancer Mother    • Obesity Mother    • Mental illness Mother    • Hypertension Mother    • Arthritis Sister    • Obesity Sister    • Mental illness Sister    • Hypertension Sister    • Obesity Brother    • Mental illness Brother    • Hypertension Brother    • Cancer Maternal Grandmother    • Breast cancer Neg Hx    • Ovarian cancer Neg Hx        Social History     Socioeconomic History   • Marital status:      Spouse name: Not on file   • Number of children: Not on file   • Years of education: Not on file   • Highest education level: Not on file   Tobacco Use   • Smoking status: Never Smoker   • Smokeless tobacco: Never Used   Substance and Sexual Activity   • Alcohol use: Yes     Alcohol/week: 6.0 standard drinks     Types: 6 Cans of beer per week     Comment: quit 02/18/2020, prior heavy alcohol use for 15y   • Drug use: Not Currently   • Sexual activity: Not Currently     Partners: Male     Birth control/protection: Post-menopausal       No Known Allergies      Medication:    Current Facility-Administered Medications:   •  dextrose (D50W) 25 g/ 50mL Intravenous Solution 25 g, 25 g, Intravenous, Q15 Min PRN, Wilmer Garsia MD, 25 g at 06/02/21 1220  •  famotidine (PEPCID) injection 20 mg, 20 mg, Intravenous, Daily, Candido Story, PharmD, 20 mg at 06/06/21 0905  •  folic acid 1 mg in sodium chloride 0.9 % 50 mL IVPB, 1 mg, Intravenous, Daily, Wilmer Garsia MD, Last Rate: 100 mL/hr at 06/06/21 0904, 1 mg at 06/06/21 0904  •  insulin regular (humuLIN R,novoLIN R) injection 0-7 Units, 0-7 Units, Subcutaneous, Q6H, Wilmer Garsia MD, 2 Units at 06/06/21 1334  •  lactulose (CHRONULAC) 10 GM/15ML solution 10 g, 10 g, Nasogastric, TID, Candido Capps MD, 10 g at 06/06/21 0905  •  Linezolid  (ZYVOX) 600 mg 300 mL, 600 mg, Intravenous, Q12H, Candido Capps MD, Last Rate: 300 mL/hr at 06/06/21 0904, 600 mg at 06/06/21 0904  •  midodrine (PROAMATINE) tablet 5 mg, 5 mg, Nasogastric, TID AC, Candido Capps MD, 5 mg at 06/06/21 1200  •  multivitamin and minerals liquid 15 mL, 15 mL, Nasogastric, Daily, Sofya Calderon, PharmD, 15 mL at 06/06/21 0905  •  norepinephrine (LEVOPHED) 8 mg in 250 mL NS infusion (premix), 0.02-0.3 mcg/kg/min, Intravenous, Titrated, Wilmer Garsia MD, Last Rate: 10.46 mL/hr at 06/06/21 0914, 0.06 mcg/kg/min at 06/06/21 0914  •  Pharmacy Consult, , Does not apply, Continuous PRN, Candido Capps MD  •  piperacillin-tazobactam (ZOSYN) 3.375 g in iso-osmotic dextrose 50 ml (premix), 3.375 g, Intravenous, Q12H, Sofya Calderon, PharmD, 3.375 g at 06/06/21 0612  •  rifAXIMin (XIFAXAN) tablet 400 mg, 400 mg, Nasogastric, Q8H, Candido Capps MD, 400 mg at 06/06/21 1336  •  saccharomyces boulardii (FLORASTOR) capsule 500 mg, 500 mg, Oral, BID, Candido Capps MD, 500 mg at 06/06/21 0904  •  sodium chloride 0.9 % flush 10 mL, 10 mL, Intravenous, Q12H, Raquel Vega APRN, 10 mL at 06/04/21 2125  •  sodium chloride 0.9 % flush 10 mL, 10 mL, Intravenous, PRN, Raquel Vega APRN  •  sodium chloride 0.9 % with KCl 20 mEq/L infusion, 75 mL/hr, Intravenous, Continuous, Mikael Vicente MD, Last Rate: 75 mL/hr at 06/05/21 1201, 75 mL/hr at 06/05/21 1201    Antibiotics:  Anti-Infectives (From admission, onward)    Ordered     Dose/Rate Route Frequency Start Stop    06/04/21 1018  Linezolid (ZYVOX) 600 mg 300 mL     Hawa Butler, PharmD reviewed the order on 06/05/21 1119.   Ordering Provider: Candido Capps MD    600 mg  300 mL/hr over 60 Minutes Intravenous Every 12 Hours Scheduled 06/04/21 1115 06/11/21 0859    06/03/21 1925  rifAXIMin (XIFAXAN) tablet 400 mg     Candido Capps MD reviewed the order on 06/05/21 1650.   Ordering Provider: Candido Capps MD    400  mg Nasogastric Every 8 Hours Scheduled 21 2200 21 1648    21 0841  piperacillin-tazobactam (ZOSYN) 3.375 g in iso-osmotic dextrose 50 ml (premix)     Sofya Calderon, PharmD reviewed the order on 21 1039.   Ordering Provider: Sofya Calderon, PharmAYAH    3.375 g  over 4 Hours Intravenous Every 12 Hours Scheduled 21 1800 21 1759    21  cefTRIAXone (ROCEPHIN) 1 g/100 mL 0.9% NS (MBP)     Ordering Provider: Yoli Barrera APRN    1 g  over 30 Minutes Intravenous Once 21 2328            Review of Systems:  No reliable ros from patient       Physical Exam:   Vital Signs  Temp (24hrs), Av.7 °F (36.5 °C), Min:97.5 °F (36.4 °C), Max:97.9 °F (36.6 °C)    Temp  Min: 97.5 °F (36.4 °C)  Max: 97.9 °F (36.6 °C)  BP  Min: 89/50  Max: 127/65  Pulse  Min: 90  Max: 101  Resp  Min: 22  Max: 28  SpO2  Min: 74 %  Max: 99 %    GENERAL: drowsy  in no acute distress.  She is jaundiced.  HEENT: Normocephalic, atraumatic.  PERRL. EOMI. No conjunctival injection.she has scleral icterus.  Oropharynx clear without evidence of thrush or exudate. No evidence of peridontal disease.    NECK: Supple without nuchal rigidity.   LYMPH: No cervical, axillary or inguinal lymphadenopathy.  HEART: RRR; 1/2 murmur, rubs, gallops.   LUNGS: Clear to auscultation bilaterally without wheezing, rales, rhonchi. Normal respiratory effort. Nonlabored. No dullness.  ABDOMEN: Obese and distended.positive bowel sounds. No rebound or guarding.   EXT:  No cyanosis, clubbing or edema. No cord.  :  Without Nunes catheter.  MSK:no joint effusions   SKIN: jaundiced .    NEURO: She is somnolent and difficult to arouse.  She is confused.  She moves all of her extremities.      Laboratory Data    Results from last 7 days   Lab Units 21  0342 21  0321 21  0317   WBC 10*3/mm3 25.86* 23.15* 13.29*   HEMOGLOBIN g/dL 10.6* 11.0* 10.6*   HEMATOCRIT % 30.8* 31.6* 31.1*   PLATELETS 10*3/mm3 165 179 162      Results from last 7 days   Lab Units 06/06/21  0342   SODIUM mmol/L 134*   POTASSIUM mmol/L 3.1*   CHLORIDE mmol/L 99   CO2 mmol/L 21.0*   BUN mg/dL 57*   CREATININE mg/dL 4.22*   GLUCOSE mg/dL 112*   CALCIUM mg/dL 8.7     Results from last 7 days   Lab Units 06/06/21  0342   ALK PHOS U/L 247*   BILIRUBIN mg/dL 10.6*   BILIRUBIN DIRECT mg/dL 8.6*   ALT (SGPT) U/L 260*   AST (SGOT) U/L 488*     Results from last 7 days   Lab Units 06/03/21  0702   SED RATE mm/hr 5     Results from last 7 days   Lab Units 06/03/21  1806   CRP mg/dL 5.65*     Results from last 7 days   Lab Units 06/06/21  0342   LACTATE mmol/L 2.0     Results from last 7 days   Lab Units 06/01/21  1736   CK TOTAL U/L 668*     Results from last 7 days   Lab Units 06/02/21  0553   VANCOMYCIN RM mcg/mL <4.00*     Estimated Creatinine Clearance: 16.6 mL/min (A) (by C-G formula based on SCr of 4.22 mg/dL (H)).      Microbiology:  Blood Culture   Date Value Ref Range Status   06/01/2021 No growth at 4 days  Preliminary     No results found for: BCIDPCR, CXREFLEX, CSFCX, CULTURETIS  No results found for: CULTURES, HSVCX, URCX  No results found for: EYECULTURE, GCCX, HSVCULTURE, LABHSV  No results found for: LEGIONELLA, MRSACX, MUMPSCX, MYCOPLASCX  No results found for: NOCARDIACX, STOOLCX  Urine Culture   Date Value Ref Range Status   06/01/2021 >100,000 CFU/mL Escherichia coli (A)  Final     No results found for: VIRALCULTU, WOUNDCX        Radiology:  Imaging Results (Last 72 Hours)     Procedure Component Value Units Date/Time    XR Chest 1 View [[id number]] Collected: 06/05/21 0944     Updated: 06/05/21 1554    Narrative:         EXAMINATION: XR CHEST 1 VW - 06/05/2021     INDICATION: N17.9-Acute kidney failure, unspecified;  E87.6-Zobx-njawvrboes and hyponatremia; E87.6-Hypokalemia; D64.9-Anemia,  unspecified; F10.20-Alcohol dependence, uncomplicated. Respiratory  failure.     COMPARISON: 06/04/2021     FINDINGS: Portable chest reveals lines and tubes  to be in satisfactory  position. Some improvement seen in aeration the left lung base.  Increased markings seen diffusely throughout the lung fields.  Degenerative changes seen within the spine. Small left pleural effusion.           Impression:      Ill-defined opacification seen at the left lung base with  small left pleural effusion and prominence of the pulmonary vascularity  bilaterally.     DICTATED:   06/05/2021  EDITED/ls :   06/05/2021     This report was finalized on 6/5/2021 3:51 PM by Dr. Leni Downey MD.       XR Chest 1 View [[id number]] Collected: 06/04/21 0827     Updated: 06/04/21 2244    Narrative:      EXAMINATION: XR CHEST 1 VW-06/04/2021:     INDICATION: Respiratory failure; N17.9-Acute kidney failure,  unspecified; E87.8-Suji-vtpyvsdjid and hyponatremia; E87.6-Hypokalemia;  D64.9-Anemia, unspecified; F10.20-Alcohol dependence, uncomplicated.     COMPARISON: 06/02/2021.     FINDINGS: Feeding tube is seen below the left hemidiaphragm. Left IJ  catheter tip lies in the SVC. The heart is borderline enlarged. The  vasculature appears at upper limits of normal. No pneumothorax is seen.  There is a very small remaining left pleural effusion.       Impression:      Mild pulmonary venous hypertension and minimal left  effusion. No significant new chest disease is seen.     D:  06/04/2021  E:  06/04/2021            This report was finalized on 6/4/2021 10:41 PM by Dr. Rodolfo Rubalcava MD.               Impression:   1.  Septic shock-with hypotension, hypothermia (temperature = 35.5), toxic/metabolic encephalopathy, PCT 3.31, lactic acidosis, acute renal failure, transaminitis, and Clostridium bacteremia.  2.  Ecoli UTI  3.  Clostridium bacteremia-her Clostridium bacteremia is most likely secondary to disruption of bowel mucosal integrity due to her shock.  She appears to have had severe hypovolemic/septic shock and I suspect that she had some secondary mesenteric ischemia.  Some species of Clostridium  such as Clostridium septicum can be associated with colorectal cancer or other bowel mucosal defects.  4.  Elevated LFTs  5.  Hyperbilirubinemia  6.  Hyponatremia-severe  7.  Alcoholism, with cirrhosis  8.  Rhabdomyolysis   9.  MRSA colonization  10.  Left lower lobe atelectasis/infiltrate  11.  Hepatic encephalopathy  12.  Acute renal failure/ATN     PLAN/RECOMMENDATIONS:   Thank you for asking us to see Laura Jang, I recommend the followin.  Cdiff PCR- negative  2.  Continue rifaximin  3.  Continue intravenous Zosyn  4.  Continue Zyvox for the time being    Dr. Monahan has obtained the history, performed the physical exam and formulated the above treatment plan.     I discussed her very complex situation with Dr. Candido Capps today.    Gianni Monahan MD  2021  15:44 EDT                  Electronically signed by Gianni Monahan MD at 21 1576

## 2021-06-07 NOTE — PLAN OF CARE
Goal Outcome Evaluation:         Neuro- oriented to time and person, strong  bilaterally, obeys commands, answers simple questions. Respiratory- bipap worn all night, sats >95%, occasional snoring. Cardiac- NSR,  SBP maintained >100, levo @ 0.04. GI/- FMS maintained, tube feeding stopped at 0000 due to ultrasound in AM, UOP dark, low UOP. Jaundiced skin/sclera. Excoriation wounds on stomach and under breasts. Turned frequently due to pressure injury on coccyx. Restraints maintained due to pulling at BIPAP.

## 2021-06-07 NOTE — THERAPY EVALUATION
Patient Name: Laura Jang  : 1965    MRN: [id number]                              Today's Date: 2021       Admit Date: 2021    Visit Dx:     ICD-10-CM ICD-9-CM   1. Acute renal failure, unspecified acute renal failure type (CMS/HCC)  N17.9 584.9   2. Hyponatremia  E87.1 276.1   3. Hypokalemia  E87.6 276.8   4. Anemia, unspecified type  D64.9 285.9   5. Alcoholism (CMS/HCC)  F10.20 303.90     Patient Active Problem List   Diagnosis   • Alcoholism (CMS/HCC)   • Bilateral hearing loss   • Chronic bilateral low back pain   • DDD (degenerative disc disease), cervical   • Neuropathy   • Anxiety   • Moderate episode of recurrent major depressive disorder (CMS/[facility])   • Alopecia   • History of iron deficiency   • History of vitamin D deficiency   • Essential hypertension   • Bilateral carpal tunnel syndrome   • Lactic acidosis resolved   • Hyponatremia resolving   • Hypokalemia   • Hypochloremia   • ABDI (acute kidney injury) (CMS/[facility])   • Elevated lipase   • Hypoproteinemia (CMS/HCC)   • Elevated bilirubin   • Leukocytosis   • Severe anemia due to GI losses (source unknown)   • E. coli UTI (urinary tract infection)   • Acute renal failure (CMS/[facility])   • Sepsis and septic shock due to Gram negative bacteria.  Urinary tract origin (CMS/HCC)   • Shock liver improved   • Hypophosphatemia   • Mild to moderate mitral stenosis on echocardiogram   • Alcoholic cirrhosis (CMS/[facility])     Past Medical History:   Diagnosis Date   • Alcohol abuse    • Anxiety    • Arthritis    • Blistering of skin 3/1/2020   • Degenerative disc disease, lumbar    • Depression    • Hypertension      Past Surgical History:   Procedure Laterality Date   • APPENDECTOMY     • BARIATRIC SURGERY      , gastric sleeve   • CARPAL TUNNEL RELEASE Bilateral     2020   • CHOLECYSTECTOMY     • HERNIA REPAIR         • SPINE SURGERY      cyst removal     General Information     Row Name 21 1508          Physical Therapy Time and Intention     Document Type  evaluation  -KG     Mode of Treatment  physical therapy  -KG     Row Name 06/07/21 1508          General Information    Patient Profile Reviewed  yes  -KG     Prior Level of Function  independent:;all household mobility;gait;transfer;ADL's;dressing;bathing pt is poor historian; per chart review  -KG     Existing Precautions/Restrictions  fall;other (see comments) NG; FMS; mckeon  -KG     Barriers to Rehab  medically complex;cognitive status  -KG     Row Name 06/07/21 1508          Living Environment    Lives With  alone pt is poor historian; per chart review  -KG     Row Name 06/07/21 1508          Home Main Entrance    Number of Stairs, Main Entrance  -- pt is poor historian; TBA later  -KG     Row Name 06/07/21 1508          Cognition    Orientation Status (Cognition)  unable/difficult to assess  -KG     Row Name 06/07/21 1508          Safety Issues, Functional Mobility    Safety Issues Affecting Function (Mobility)  ability to follow commands;awareness of need for assistance;insight into deficits/self-awareness;safety precaution awareness;safety precautions follow-through/compliance  -KG     Impairments Affecting Function (Mobility)  balance;cognition;coordination;endurance/activity tolerance;postural/trunk control;strength  -KG     Cognitive Impairments, Mobility Safety/Performance  attention;awareness, need for assistance;insight into deficits/self-awareness;safety precaution awareness;safety precaution follow-through  -KG       User Key  (r) = Recorded By, (t) = Taken By, (c) = Cosigned By    Initials Name Provider Type    KG Gill Perez, PT Physical Therapist        Mobility     Row Name 06/07/21 1511          Bed Mobility    Bed Mobility  scooting/bridging;supine-sit;sit-supine  -KG     Scooting/Bridging [place] (Bed Mobility)  dependent (less than 25% patient effort);2 person assist;verbal cues  -KG     Supine-Sit [place] (Bed Mobility)  maximum assist (25% patient  effort);2 person assist;verbal cues  -KG     Sit-Supine [place] (Bed Mobility)  maximum assist (25% patient effort);2 person assist;verbal cues  -KG     Assistive Device (Bed Mobility)  draw sheet;head of bed elevated  -KG     Comment (Bed Mobility)  VC's for sequencing. Pt required assistance at trunk and BLEs. Upon sitting EOB pt demonstrated lateral lean to the L; able to correct with verbal and tactile cues.  -KG     Row Name 06/07/21 1511          Transfers    Comment (Transfers)  OOB activities deferred this date. Not appropriate to assess due to cognitive status and inability to follow commands.  -KG     Row Name 06/07/21 1511          Sit-Stand Transfer    Sit-Stand [place] (Transfers)  unable to assess  -KG     Row Name 06/07/21 1511          Gait/Stairs (Locomotion)    [place] Level (Gait)  unable to assess  -KG     Comment (Gait/Stairs)  Ambulation deferred. Not appropriate to assess.  -KG       User Key  (r) = Recorded By, (t) = Taken By, (c) = Cosigned By    Initials Name Provider Type    Gill Lala PT Physical Therapist        Obj/Interventions     Row Name 06/07/21 1513          Range of Motion Comprehensive    Comment, General Range of Motion  BLE WFL  -KG     Row Name 06/07/21 1513          Strength Comprehensive (MMT)    Comment, General Manual Muscle Testing (MMT) Assessment  unable to formally assess due to cognitive status  -KG     Row Name 06/07/21 1513          Balance    Balance Assessment  sitting static balance;sitting dynamic balance  -KG     Static Sitting Balance  mild impairment;sitting, edge of bed  -KG     Dynamic Sitting Balance  moderate impairment;supported;sitting, edge of bed  -KG     Row Name 06/07/21 1513          Sensory Assessment (Somatosensory)    Sensory Assessment (Somatosensory)  unable/difficult to assess  -KG       User Key  (r) = Recorded By, (t) = Taken By, (c) = Cosigned By    Initials Name Provider Type    Gill Lala, PT  Physical Therapist        Goals/Plan     Row Name 06/07/21 1521          Bed Mobility Goal 1 (PT)    Activity/Assistive Device (Bed Mobility Goal 1, PT)  sit to supine;supine to sit  -KG     [place] Level/Cues Needed (Bed Mobility Goal 1, PT)  moderate assist (50-74% patient effort)  -KG     Time Frame (Bed Mobility Goal 1, PT)  2 weeks  -KG     Progress/Outcomes (Bed Mobility Goal 1, PT)  goal ongoing  -KG     Row Name 06/07/21 1521          Transfer Goal 1 (PT)    Activity/Assistive Device (Transfer Goal 1, PT)  sit-to-stand/stand-to-sit;bed-to-chair/chair-to-bed;walker, rolling  -KG     [place] Level/Cues Needed (Transfer Goal 1, PT)  moderate assist (50-74% patient effort)  -KG     Time Frame (Transfer Goal 1, PT)  2 weeks  -KG     Progress/Outcome (Transfer Goal 1, PT)  goal ongoing  -KG     Row Name 06/07/21 1521          Gait Training Goal 1 (PT)    Activity/Assistive Device (Gait Training Goal 1, PT)  gait (walking locomotion);assistive device use;walker, rolling  -KG     [place] Level (Gait Training Goal 1, PT)  maximum assist (25-49% patient effort)  -KG     Distance (Gait Training Goal 1, PT)  10 feet  -KG     Time Frame (Gait Training Goal 1, PT)  2 weeks  -KG     Progress/Outcome (Gait Training Goal 1, PT)  goal ongoing  -KG       User Key  (r) = Recorded By, (t) = Taken By, (c) = Cosigned By    Initials Name Provider Type    KG Gill Perez, PT Physical Therapist        Clinical Impression     Row Name 06/07/21 1517          Pain    Additional Documentation  Pain Scale: FACES Pre/Post-Treatment (Group)  -KG     Row Name 06/07/21 1517          Pain Scale: FACES Pre/Post-Treatment    Pain: FACES Scale, Pretreatment  0-->no hurt  -KG     Posttreatment Pain Rating  0-->no hurt  -KG     Row Name 06/07/21 1517          Plan of Care Review    Plan of Care Reviewed With  patient  -KG     Outcome Summary  PT initial evaluation completed for pt presenting with generalized weakness,  impaired cognition, and decreased functional mobility. Pt required maxA x2 for supine to sit and to return to supine. All OOB activities deferred this date. Pt's decreased independence warrants PT skilled care. Recommend D/C to SNF.  -KG     Row Name 06/07/21 1517          Therapy Assessment/Plan (PT)    Patient/Family Therapy Goals Statement (PT)  pt unable to state  -KG     Rehab Potential (PT)  fair, will monitor progress closely  -KG     Criteria for Skilled Interventions Met (PT)  yes;skilled treatment is necessary  -KG     Row Name 06/07/21 1517          Vital Signs    Pre Systolic BP Rehab  107  -KG     Pre Treatment Diastolic BP  49  -KG     Post Systolic BP Rehab  109  -KG     Post Treatment Diastolic BP  50  -KG     Pretreatment Heart Rate (beats/min)  91  -KG     Posttreatment Heart Rate (beats/min)  86  -KG     Pre SpO2 (%)  97  -KG     O2 Delivery Pre Treatment  room air  -KG     Post SpO2 (%)  96  -KG     O2 Delivery Post Treatment  room air  -KG     Pre Patient Position  Supine  -KG     Intra Patient Position  Sitting  -KG     Post Patient Position  Supine  -KG     Row Name 06/07/21 1517          Positioning and Restraints    Pre-Treatment Position  in bed  -KG     Post Treatment Position  bed  -KG     In Bed  notified nsg;supine;call light within reach;encouraged to call for assist;side rails up x3;RUE elevated;LUE elevated;waffle boots/both  -KG     Restraints  released:;reapplied:;notified nsg:;soft limb  -KG       User Key  (r) = Recorded By, (t) = Taken By, (c) = Cosigned By    Initials Name Provider Type    KG Gill Perez, PT Physical Therapist        Outcome Measures     Row Name 06/07/21 1521 06/07/21 0800       How much help from another person do you currently need...    Turning from your back to your side while in flat bed without using bedrails?  2  -KG  1  -ER    Moving from lying on back to sitting on the side of a flat bed without bedrails?  2  -KG  1  -ER    Moving to and  from a bed to a chair (including a wheelchair)?  1  -KG  1  -ER    Standing up from a chair using your arms (e.g., wheelchair, bedside chair)?  1  -KG  1  -ER    Climbing 3-5 steps with a railing?  1  -KG  1  -ER    To walk in hospital room?  1  -KG  1  -ER    AM-PAC 6 Clicks Score (PT)  8  -KG  6  -ER    Row Name 06/07/21 1521 06/07/21 1448       Functional Assessment    Outcome Measure Options  AM-PAC 6 Clicks Basic Mobility (PT)  -KG  AM-PAC 6 Clicks Daily Activity (OT)  -CS      User Key  (r) = Recorded By, (t) = Taken By, (c) = Cosigned By    Initials Name Provider Type    ER Yoli Rosales, RN Registered Nurse    KG Gill Perez, PT Physical Therapist    CS Serafin Sierra, OT Occupational Therapist        Physical Therapy Education                 Title: PT OT SLP Therapies (In Progress)     Topic: Physical Therapy (In Progress)     Point: Mobility training (In Progress)     Learning Progress Summary           Patient Acceptance, E, NR by KG at 6/7/2021 1330                   Point: Home exercise program (Not Started)     Learner Progress:  Not documented in this visit.          Point: Body mechanics (In Progress)     Learning Progress Summary           Patient Acceptance, E, NR by KG at 6/7/2021 1330                   Point: Precautions (In Progress)     Learning Progress Summary           Patient Acceptance, E, NR by KG at 6/7/2021 1330                               User Key     Initials Effective Dates Name Provider Type Discipline    KG 05/22/20 -  Gill Perez, PT Physical Therapist PT              PT Recommendation and Plan  Planned Therapy Interventions (PT): balance training, bed mobility training, gait training, strengthening, transfer training  Plan of Care Reviewed With: patient  Outcome Summary: PT initial evaluation completed for pt presenting with generalized weakness, impaired cognition, and decreased functional mobility. Pt required maxA x2 for supine to sit and to  return to supine. All OOB activities deferred this date. Pt's decreased independence warrants PT skilled care. Recommend D/C to SNF.     Time Calculation:   PT Charges     Row Name 06/07/21 1330             Time Calculation    Start Time  1330  -KG      PT Received On  06/07/21  -KG      PT Goal Re-Cert Due Date  06/17/21  -KG         Untimed Charges    PT Eval/Re-eval Minutes  55  -KG         Total Minutes    Untimed Charges Total Minutes  55  -KG       Total Minutes  55  -KG        User Key  (r) = Recorded By, (t) = Taken By, (c) = Cosigned By    Initials Name Provider Type    KG Gill Perez, PT Physical Therapist        Therapy Charges for Today     Code Description Service Date Service Provider Modifiers Qty    77816671066 HC PT EVAL MOD COMPLEXITY 4 6/7/2021 Gill Perez, PT GP 1          PT G-Codes  Outcome Measure Options: AM-PAC 6 Clicks Basic Mobility (PT)  AM-PAC 6 Clicks Score (PT): 8  AM-PAC 6 Clicks Score (OT): 6    Elaine Perez PT  6/7/2021

## 2021-06-07 NOTE — CASE MANAGEMENT/SOCIAL WORK
"Continued Stay Note  [facility]     Patient Name: Laura Jang  MRN: [id number]  Today's Date: 6/7/2021    Admit Date: 6/1/2021    Discharge Plan     Row Name 06/07/21 1618       Plan    Plan  CM update    Plan Comments  \"patient's sister and critical care team had discussion with regards to goals of care and palliative team visit pending.\" _ CM will follow for palliative consult and once GOC established, Cm will be able to furthur pursue discharge planning. PT and OT recommend SNF at this time - Will make referrals once appropriate - wicho [phone]        Discharge Codes    No documentation.             Wicho Multani RN    "

## 2021-06-07 NOTE — PLAN OF CARE
Goal Outcome Evaluation:  Plan of Care Reviewed With: patient        Progress: declining  Outcome Summary: Pt's sister/decision maker at bedside throughout shift-- made decision to consult palliative. Will transfer to hospice services tomorrow after son arrives at bedside. D/Cd keofeed-- levophed on 0.04 throughout night to maintain adequate pressures. FC and FMS in place. No c/o pain. Pt sister to remain at bedside throughout night per palliative APRN. No other issues at this time-- will continue to remain in ICU until tomorrow.

## 2021-06-07 NOTE — PROGRESS NOTES
INFECTIOUS DISEASE Progress Note    Laura Jang  1965  [id number]    Admission Date: 6/1/2021      Requesting Provider: No ref. provider found  Evaluating Physician: Gianni Monahan MD    Reason for Consultation: Clostridium bacteremia    History of present illness:    6/6/21: Patient is a 56 y.o. female, with PMH alcoholism, cirrhosis, seen today for Clostridium bacteremia.  She was brought to the ED after being found down at home, surrounded by beer cans, and fecal incontinence.  She lost her job 4 weeks ago, and she increased her alcohol intake, but several weeks ago, with the help of her therapist, was trying to wean her consumption.  Admitting labs with , proBNP 3736, lAC 9.7, Scr 4.48, , AST 2211, , TBili, 6.7, WBC 21543 PCT 3.3,  UA TNTC WBCS, urine culture now with Ecoli. MRSA PCR positive.   CXR with increased vascular congestion, no overt edema, or pleural effusions. HCT no acute intracranial findings. Abdominal CT with severe hepatic steatosis, liver appears mildly nodular suggesting hepatic cirrhosis, increased attenuation in the mesenteric fat below aortic bifurcation, can be seen in mesenteric panniculitis, hepatic cirrhosis. Blood culture now positive for Clostridium species. She is currently on Zosyn, Rifaximin, and Linezolid.  She is currently confused/somnolent and cannot provide any reliable review of systems.    6/7/21: She has remained afebrile. She continues to require vasopressor support with Levophed. Her creatinine is 4.97 today.  Her bilirubin remains elevated at 10.4.  She has a coagulopathy with a  Prothrombin time of 17.7. Her white blood cell count remains markedly elevated at 28.3. She is unable to provide any reliable history due to her confusion.    Past Medical History:   Diagnosis Date   • Alcohol abuse    • Anxiety    • Arthritis    • Blistering of skin 3/1/2020   • Degenerative disc disease, lumbar    • Depression    • Hypertension        Past Surgical  History:   Procedure Laterality Date   • APPENDECTOMY     • BARIATRIC SURGERY      2005, gastric sleeve   • CARPAL TUNNEL RELEASE Bilateral     07/2020   • CHOLECYSTECTOMY     • HERNIA REPAIR      2014   • SPINE SURGERY      cyst removal       Family History   Problem Relation Age of Onset   • Arthritis Mother    • Cancer Mother    • Obesity Mother    • Mental illness Mother    • Hypertension Mother    • Arthritis Sister    • Obesity Sister    • Mental illness Sister    • Hypertension Sister    • Obesity Brother    • Mental illness Brother    • Hypertension Brother    • Cancer Maternal Grandmother    • Breast cancer Neg Hx    • Ovarian cancer Neg Hx        Social History     Socioeconomic History   • Marital status:      Spouse name: Not on file   • Number of children: Not on file   • Years of education: Not on file   • Highest education level: Not on file   Tobacco Use   • Smoking status: Never Smoker   • Smokeless tobacco: Never Used   Substance and Sexual Activity   • Alcohol use: Yes     Alcohol/week: 6.0 standard drinks     Types: 6 Cans of beer per week     Comment: quit 02/18/2020, prior heavy alcohol use for 15y   • Drug use: Not Currently   • Sexual activity: Not Currently     Partners: Male     Birth control/protection: Post-menopausal       No Known Allergies      Medication:    Current Facility-Administered Medications:   •  dextrose (D50W) 25 g/ 50mL Intravenous Solution 25 g, 25 g, Intravenous, Q15 Min PRN, Wilmer Garsia MD, 25 g at 06/02/21 1220  •  famotidine (PEPCID) tablet 20 mg, 20 mg, Nasogastric, Daily, Candido Capps MD, 20 mg at 06/07/21 0806  •  folic acid 1 mg in sodium chloride 0.9 % 50 mL IVPB, 1 mg, Intravenous, Daily, Wilmer Garsia MD, Last Rate: 100 mL/hr at 06/07/21 0806, 1 mg at 06/07/21 0806  •  insulin regular (humuLIN R,novoLIN R) injection 0-7 Units, 0-7 Units, Subcutaneous, Q6H, Wilmer Garsia MD, 2 Units at 06/06/21 1334  •   lactulose (CHRONULAC) 10 GM/15ML solution 10 g, 10 g, Nasogastric, TID, Candido Capps MD, 10 g at 06/07/21 0806  •  Linezolid (ZYVOX) 600 mg 300 mL, 600 mg, Intravenous, Q12H, Candido Capps MD, Last Rate: 300 mL/hr at 06/07/21 0806, 600 mg at 06/07/21 0806  •  midodrine (PROAMATINE) tablet 5 mg, 5 mg, Nasogastric, TID AC, Candido Capps MD, 5 mg at 06/07/21 0630  •  multivitamin and minerals liquid 15 mL, 15 mL, Nasogastric, Daily, Sofya Calderon, PharmD, 15 mL at 06/07/21 0806  •  norepinephrine (LEVOPHED) 8 mg in 250 mL NS infusion (premix), 0.02-0.3 mcg/kg/min, Intravenous, Titrated, Wilmer Garsia MD, Last Rate: 6.98 mL/hr at 06/07/21 0000, 0.04 mcg/kg/min at 06/07/21 0000  •  Pharmacy Consult, , Does not apply, Continuous PRN, Candido Capps MD  •  piperacillin-tazobactam (ZOSYN) 3.375 g in iso-osmotic dextrose 50 ml (premix), 3.375 g, Intravenous, Q12H, Sofya Calderon, PharmD, 3.375 g at 06/07/21 0629  •  rifAXIMin (XIFAXAN) tablet 400 mg, 400 mg, Nasogastric, Q8H, Candido Capps MD, 400 mg at 06/07/21 0630  •  saccharomyces boulardii (FLORASTOR) capsule 500 mg, 500 mg, Oral, BID, Candido Capps MD, 500 mg at 06/07/21 0806  •  sodium chloride 0.9 % flush 10 mL, 10 mL, Intravenous, Q12H, Raquel Vega, APRN, 10 mL at 06/06/21 2157  •  sodium chloride 0.9 % flush 10 mL, 10 mL, Intravenous, PRN, Raquel Vega, APRN  •  sodium chloride 0.9 % with KCl 20 mEq/L infusion, 75 mL/hr, Intravenous, Continuous, Mikael Vicente MD, Last Rate: 75 mL/hr at 06/05/21 1201, 75 mL/hr at 06/05/21 1201    Antibiotics:  Anti-Infectives (From admission, onward)    Ordered     Dose/Rate Route Frequency Start Stop    06/04/21 1018  Linezolid (ZYVOX) 600 mg 300 mL     Hawa Butler, PharmD reviewed the order on 06/05/21 1119.   Ordering Provider: Candido Capps MD    600 mg  300 mL/hr over 60 Minutes Intravenous Every 12 Hours Scheduled 06/04/21 1115 06/11/21 0859    06/03/21 1925  rifAXIMin  (XIFAXAN) tablet 400 mg     Candido Capps MD reviewed the order on 21 1650.   Ordering Provider: Candido Capps MD    400 mg Nasogastric Every 8 Hours Scheduled 21 2200 21 1648    21 0841  piperacillin-tazobactam (ZOSYN) 3.375 g in iso-osmotic dextrose 50 ml (premix)     Sofya Calderon, PharmD reviewed the order on 21 1039.   Ordering Provider: Sofya Calderon, PharmD    3.375 g  over 4 Hours Intravenous Every 12 Hours Scheduled 21 1800 21 1759    21 203  cefTRIAXone (ROCEPHIN) 1 g/100 mL 0.9% NS (MBP)     Ordering Provider: Yoli Barrera APRN    1 g  over 30 Minutes Intravenous Once 21 2328            Review of Systems:  No reliable ros from patient       Physical Exam:   Vital Signs  Temp (24hrs), Av.9 °F (36.6 °C), Min:97.7 °F (36.5 °C), Max:98.1 °F (36.7 °C)    Temp  Min: 97.7 °F (36.5 °C)  Max: 98.1 °F (36.7 °C)  BP  Min: 91/61  Max: 127/65  Pulse  Min: 87  Max: 101  Resp  Min: 22  Max: 26  SpO2  Min: 91 %  Max: 100 %    GENERAL: drowsy  in no acute distress.  She is jaundiced.and debilitated appearing  HEENT: Normocephalic, atraumatic.  PERRL. EOMI. No conjunctival injection.she has scleral icterus.  Oropharynx clear without evidence of thrush or exudate.   NECK: Supple   HEART: RRR; 1/2 murmur, rubs, gallops.   LUNGS: Clear to auscultation bilaterally without wheezing, rales, rhonchi. Normal respiratory effort. Nonlabored. No dullness.  ABDOMEN: Obese and distended.positive bowel sounds. No rebound or guarding.   EXT:  No cyanosis, clubbing or edema. .  :  A Nunes catheter is present  MSK:no joint effusions   SKIN: jaundiced .    NEURO: She is somnolent and difficult to arouse.  She is confused.  She moves all of her extremities.      Laboratory Data    Results from last 7 days   Lab Units 21  0257 21  0342 21  0321   WBC 10*3/mm3 28.32* 25.86* 23.15*   HEMOGLOBIN g/dL 11.0* 10.6* 11.0*   HEMATOCRIT % 32.3* 30.8*  31.6*   PLATELETS 10*3/mm3 143 165 179     Results from last 7 days   Lab Units 06/07/21  0257   SODIUM mmol/L 135*   POTASSIUM mmol/L 3.8   CHLORIDE mmol/L 101   CO2 mmol/L 20.0*   BUN mg/dL 74*   CREATININE mg/dL 4.97*   GLUCOSE mg/dL 93   CALCIUM mg/dL 8.7     Results from last 7 days   Lab Units 06/07/21  0257 06/06/21  0342   ALK PHOS U/L 247* 247*   BILIRUBIN mg/dL 10.4* 10.6*   BILIRUBIN DIRECT mg/dL  --  8.6*   ALT (SGPT) U/L 208* 260*   AST (SGOT) U/L 368* 488*     Results from last 7 days   Lab Units 06/03/21  0702   SED RATE mm/hr 5     Results from last 7 days   Lab Units 06/03/21  1806   CRP mg/dL 5.65*     Results from last 7 days   Lab Units 06/06/21  0342   LACTATE mmol/L 2.0     Results from last 7 days   Lab Units 06/01/21  1736   CK TOTAL U/L 668*     Results from last 7 days   Lab Units 06/02/21  0553   VANCOMYCIN RM mcg/mL <4.00*     Estimated Creatinine Clearance: 14.1 mL/min (A) (by C-G formula based on SCr of 4.97 mg/dL (H)).      Microbiology:  Blood Culture   Date Value Ref Range Status   06/01/2021 No growth at 4 days  Preliminary     No results found for: BCIDPCR, CXREFLEX, CSFCX, CULTURETIS  No results found for: CULTURES, HSVCX, URCX  No results found for: EYECULTURE, GCCX, HSVCULTURE, LABHSV  No results found for: LEGIONELLA, MRSACX, MUMPSCX, MYCOPLASCX  No results found for: NOCARDIACX, STOOLCX  Urine Culture   Date Value Ref Range Status   06/01/2021 >100,000 CFU/mL Escherichia coli (A)  Final     No results found for: VIRALCULTU, WOUNDCX        Radiology:  Imaging Results (Last 72 Hours)     Procedure Component Value Units Date/Time    XR Chest 1 View [[id number]] Resulted: 06/07/21 0336     Updated: 06/07/21 0404    XR Chest 1 View [[id number]] Collected: 06/05/21 0944     Updated: 06/05/21 1554    Narrative:         EXAMINATION: XR CHEST 1 VW - 06/05/2021     INDICATION: N17.9-Acute kidney failure, unspecified;  E87.7-Vmwy-regqfkqovj and hyponatremia; E87.6-Hypokalemia;  D64.9-Anemia,  unspecified; F10.20-Alcohol dependence, uncomplicated. Respiratory  failure.     COMPARISON: 06/04/2021     FINDINGS: Portable chest reveals lines and tubes to be in satisfactory  position. Some improvement seen in aeration the left lung base.  Increased markings seen diffusely throughout the lung fields.  Degenerative changes seen within the spine. Small left pleural effusion.           Impression:      Ill-defined opacification seen at the left lung base with  small left pleural effusion and prominence of the pulmonary vascularity  bilaterally.     DICTATED:   06/05/2021  EDITED/ls :   06/05/2021     This report was finalized on 6/5/2021 3:51 PM by Dr. Leni Downey MD.       XR Chest 1 View [[id number]] Collected: 06/04/21 0827     Updated: 06/04/21 2244    Narrative:      EXAMINATION: XR CHEST 1 VW-06/04/2021:     INDICATION: Respiratory failure; N17.9-Acute kidney failure,  unspecified; E87.4-Pvjr-otycqiohxs and hyponatremia; E87.6-Hypokalemia;  D64.9-Anemia, unspecified; F10.20-Alcohol dependence, uncomplicated.     COMPARISON: 06/02/2021.     FINDINGS: Feeding tube is seen below the left hemidiaphragm. Left IJ  catheter tip lies in the SVC. The heart is borderline enlarged. The  vasculature appears at upper limits of normal. No pneumothorax is seen.  There is a very small remaining left pleural effusion.       Impression:      Mild pulmonary venous hypertension and minimal left  effusion. No significant new chest disease is seen.     D:  06/04/2021  E:  06/04/2021            This report was finalized on 6/4/2021 10:41 PM by Dr. Rodolfo Rubalcava MD.               Impression:   1.  Septic shock-with hypotension, hypothermia (temperature = 35.5), toxic/metabolic encephalopathy, PCT 3.31, lactic acidosis, acute renal failure, transaminitis, and Clostridium bacteremia.  2.  Ecoli UTI  3.  Clostridium bacteremia-her Clostridium bacteremia is most likely secondary to disruption of bowel mucosal  integrity due to her shock.  She appears to have had severe hypovolemic/septic shock and I suspect that she had some secondary mesenteric ischemia.  Some species of Clostridium such as Clostridium septicum can be associated with colorectal cancer or other bowel mucosal defects.  4.  Elevated LFTs  5.  Hyperbilirubinemia  6.  Hyponatremia-severe  7.  Alcoholism- with cirrhosis  8.  Rhabdomyolysis   9.  MRSA colonization  10.  Left lower lobe atelectasis/infiltrate  11.  Hepatic encephalopathy  12.  Acute renal failure/ATN  13.  Coagulopathy     PLAN/RECOMMENDATIONS:   1.  Continue vasopressor support  2.  Continue rifaximin  3.  Continue intravenous Zosyn  4.  Continue Zyvox for the time being    Gianni Monahan MD  6/7/2021  08:20 EDT

## 2021-06-07 NOTE — PROGRESS NOTES
"Intensive Care Follow-up     Hospital:  LOS: 6 days   Ms. Laura Jang, 56 y.o. female is followed for:   Sepsis due to Gram negative bacteria (CMS/HCC)            History of present illness:   55-year-old white female non-smoker with a history of significant alcoholism, hypertension, obesity and both sleeve gastrectomy, lap band, and cholecystectomy. Has significant chronic anxiety/depression, and DDD.  Apparently lost her job 4 weeks ago and has had increased alcohol intake.  With the help of her therapist, 2 weeks ago began to wean off alcohol and began to note increasing weakness and lethargy.  Was also apparently begun on unknown \"new medication\".  On 6/1/2021 a friend who had not talked to her in several days went by patient's house and found her on the floor surrounded by beer cans with fecal incontinence.  Upon EMS arrival glucose was in the 30s and patient was hypotensive with a SBP in the 80s.  D50 led to improvement in mentation and she was transferred to [facility] ER.  In the ER hematocrit 25, WBC 12.97 with a left shift, procalcitonin 3.31, platelets 183, sodium 107, potassium 3.0, chloride 64, bicarb 16, BUN 18, creatinine 4.48, alk phos 139, , AST 2,211, bilirubin 6.7, amylase 56, INR 2.82, serum ammonia 58, and albumin 3.0.  Cortisol was 65. ProBNP 3,736.  ABGs revealed pH 7.42, PCO2 24, PO2 80 on room air but she had a lactic acid of 9.2.. Urinalysis revealed TNTC  WBCs and 4+ bacteria.  Patient was cultured up and received a dose of Zosyn but never received vancomycin.  Was also begun on fluids and pressors.  By 6/2/2021 patient seemed to feel better with fluids, pressors, and antimicrobial therapy and lactic acid level decreased.  Mentation initially seemed to improve but when hematocrit dropped to 22 she required transfusion with PRBC which was associated with increased serum ammonia and increased confusion.  Urine cultures returned growing a sensitive E. coli and she was continued on Zosyn (a " positive flora in her blood culture was felt to be contaminant). Nasal swab PCR however was positive for MRSA. Renal function remained compromised  and nephrology was asked to see.  Patient had remained a full code per her estranged , but after discussions between the  and the patient's sister who is a physician practicing in [place], decisions were made to proceed with no CPR and DNI but continued limited support. (End of copied text)    Patient grew Clostridium and blood cultures and ID team is evaluating and help manage.  Echocardiogram showing ejection fraction hyperdynamic but right ventricular pressure elevated.    Further history obtained from sister and patient has a history of alcohol abuse.  She has been sober for couple of months at a time but relapses.  Had pretty significant head injury last year and was admitted in the hospital at  requiring debridement of scalp lesions and grafting.  She was living with her sister for a while but relapsed and now she was living by herself in a hotel quitting alcohol has been a problem for her.  Her  lives in [facility] who apparently has delegated medical decision making to patient's sister who is practicing physician here in [facility].    Subjective   Interval History:  Overnight remained on low dose norepinephrine. Remains oliguric. Significant number of bowel movements overnight.  Lactulose has been held overnight.  Remains encephalopathic.                 The patient's past medical, surgical and social history were reviewed and updated in Epic as appropriate.       Objective     Infusions:  norepinephrine, 0.02-0.3 mcg/kg/min, Last Rate: 0.04 mcg/kg/min (06/07/21 0000)  sodium chloride 0.9 % with KCl 20 mEq, 50 mL/hr, Last Rate: 50 mL/hr (06/07/21 0955)      Medications:  famotidine, 20 mg, Nasogastric, Daily  folic acid (FOLVITE) IVPB, 1 mg, Intravenous, Daily  heparin (porcine), , ,   heparin (porcine), , ,   insulin regular,  0-7 Units, Subcutaneous, Q6H  lactulose, 10 g, Nasogastric, TID  Linezolid, 600 mg, Intravenous, Q12H  midodrine, 5 mg, Nasogastric, TID AC  multivitamin and minerals, 15 mL, Nasogastric, Daily  piperacillin-tazobactam, 3.375 g, Intravenous, Q12H  rifAXIMin, 400 mg, Nasogastric, Q8H  saccharomyces boulardii, 500 mg, Oral, BID  sodium chloride, 10 mL, Intravenous, Q12H        Vital Sign Min/Max for last 24 hours  Temp  Min: 96.8 °F (36 °C)  Max: 98.1 °F (36.7 °C)   BP  Min: 91/61  Max: 127/65   Pulse  Min: 83  Max: 101   Resp  Min: 22  Max: 26   SpO2  Min: 93 %  Max: 100 %   Flow (L/min)  Min: 2  Max: 4       Input/Output for last 24 hour shift  06/06 0701 - 06/07 0700  In: 2375 [I.V.:1665]  Out: 1015 [Urine:215]      Objective  General Appearance: Eyes open, and mild respiratory distress with paradoxical breathing but without any acute distress  Head:    Atraumatic, Normocephalic, scleral icterus, Pupils reactive & symmetrical B/L.  Neck:   Supple.   Lungs:   B/L Breath sounds present with decreased breath sounds on bases, no wheezing heard, occ crackles.   Heart: S1 and S2 present, no murmur\  Abdomen: Obese soft, nontender, no guarding or rigidity, bowel sounds hypoactive.  Extremities: Atraumatic, no cyanosis or clubbing,  + edema, warm to touch.  Pulses: Positive and symmetric.  Neurologic: Remains encephalopathic and wrist  restraints    Results from last 7 days   Lab Units 06/07/21  0257 06/06/21  0342 06/05/21  0321   WBC 10*3/mm3 28.32* 25.86* 23.15*   HEMOGLOBIN g/dL 11.0* 10.6* 11.0*   PLATELETS 10*3/mm3 143 165 179     Results from last 7 days   Lab Units 06/07/21  0257 06/06/21  0342 06/05/21  0321 06/04/21  0317   SODIUM mmol/L 135* 134* 133* 125*   POTASSIUM mmol/L 3.8 3.1* 2.9* 2.9*   CO2 mmol/L 20.0* 21.0* 22.0 22.0   BUN mg/dL 74* 57* 39* 28*   CREATININE mg/dL 4.97* 4.22* 4.31* 4.69*   MAGNESIUM mg/dL 2.0  --  1.9 2.3   PHOSPHORUS mg/dL 3.4 2.0* 1.6* 0.5*   GLUCOSE mg/dL 93 112* 122* 181*      Estimated Creatinine Clearance: 14.1 mL/min (A) (by C-G formula based on SCr of 4.97 mg/dL (H)).    Results from last 7 days   Lab Units 06/05/21  0312   PH, ARTERIAL pH units 7.447   PCO2, ARTERIAL mm Hg 32.1*   PO2 ART mm Hg 68.5*       Images:   Chest x-ray reviewed personally and showed cardiomegaly with probable small left pleural effusion.  Mild prominent pulmonary vasculature noted.    I reviewed the patient's results and images.     Assessment/Plan   Impression        Sepsis and septic shock due to Gram negative bacteria.  Urinary tract origin (CMS/HCC)    Alcoholism (CMS/HCC)    Moderate episode of recurrent major depressive disorder (CMS/HCC)    Lactic acidosis resolved    Hyponatremia resolving    Hypokalemia    Elevated bilirubin    Leukocytosis    Severe anemia due to GI losses (source unknown)    E. coli UTI (urinary tract infection)    Acute renal failure (CMS/HCC)    Shock liver improved    Hypophosphatemia    Mild to moderate mitral stenosis on echocardiogram    Alcoholic cirrhosis (CMS/HCC)       Plan        1.  Patient presenting with transaminitis, encephalopathy, with history of alcohol abuse.  There has been history of Thorne cirrhosis as well.  GI team is following.  Patient does have portosystemic encephalopathy.  Has been on rifaximin and lactulose.  Significant bowel movements noted overnight.  Lactulose has been held.  Ammonia level has not been checked in quite variable.  Encephalopathy still persisting.  Will continue lactulose as needed.  Continue rifaximin.  We will add thiamine daily.    2.  Transaminitis improving but bilirubin elevation also noted.  Alk phos is stable.  Synthetic function has shown improvement in coagulopathy.  Liver ultrasound has been done but pending.  Hepatitis antibodies were positive but IgM antibody negative.  Continue supportive care.    3.  Patient will been worsening renal failure.  Multiple possible etiologies including ATN from sepsis, hepatorenal  syndrome, intrinsic renal disease.  Baseline creatinine in 2020 August was normal.  Remains oliguric.  Electrolytes currently are acceptable.  Discussed with nephrology and they are anticipating hemodialysis support.  We initially were planning to put tunneled dialysis line to start hemodialysis but sister wants to hold off on that and looking at more long-term prognosis and quality of life.  We will have palliative care team visit with.    4.  Change famotidine to Protonix for GI prophylaxis.    5.  Enteral nutrition to continue.  Not awake enough to undergo speech evaluation at this time.    6.  Encephalopathy multifactorial, continue supportive care.    7.  Clostridium and bacterial cultures.  On Zosyn Zyvox per ID.    8.  Monitor blood glucose level to to monitor for hypoglycemia.    Continue supportive care at this time.  Patient remains critically ill with guarded overall prognosis.  Sister is aware of it.  Patient's estranged  apparently is on the way from [facility] as well.  Family struggling with further course.  They do not want to subject her to long-term dialysis.  Discussed that cultures may be temporary but it is hard to delineate at this time.  She verbalized understanding.  We will have palliative care team work with family to discuss goals of care.    Plan of care and goals reviewed with multidisciplinary/antibiotic stewardship team during rounds.   I discussed the patient's findings and my recommendations with patient, family and nursing staff     High level of risk due to:  illness with threat to life or bodily function.    Time spent Critical care 30 min (exclusive of procedure time)  including high complexity decision making to assess, manipulate, and support vital organ system failure in this individual who has impairment of one or more vital organ systems such that there is a high probability of imminent or life threatening deterioration in the patient’s condition.      Meño CARROLL  MD Tae, FCCP  Pulmonary, Critical care and Sleep Medicine

## 2021-06-07 NOTE — PLAN OF CARE
Goal Outcome Evaluation:  Plan of Care Reviewed With: patient, sibling (TACOSAmarilis SalgadoSmoothmita MARCELO spoke with sister by phone, met in pt's room)        Progress: no change  Outcome Summary: Palliative consult for GOC/ACP. TACOSAmarilis Smooth MARCELO confirmed with pt's sister Davin that although pt's estranged spouse has deferred to her for healthcare decision-making, she does keep in touch with him and keep him apprised. TF discontinued, NGT removed; no dialysis per discussion w/ sister. Palliative following for continued support and GOC.    1300 Palliative Team Conference: TL Viramontes RN, CHPN; DAVIAN Be; NICOLETTE Patel MD; TIM Mullins, [facility], [facility]; ALEX Martinez RN, PN; DAI Jones MDiv, [facility]; PABLO Dalton RN, CHPN    Problem: Palliative Care  Goal: Enhanced Quality of Life  Outcome: Ongoing, Progressing  Intervention: Maximize Comfort  Flowsheets (Taken 6/7/2021 1701)  Pain Management Interventions: (pt denied pain) other (see comments)  Intervention: Optimize Function  Flowsheets (Taken 6/7/2021 1701)  Sensory Stimulation Regulation: quiet environment promoted  Intervention: Promote Advance Care Planning  Flowsheets (Taken 6/7/2021 1701)  Life Transition/Adjustment:   palliative care discussed   palliative care initiated  Intervention: Optimize Psychosocial Wellbeing  Flowsheets (Taken 6/7/2021 1701)  Spiritual Activities Assistance: (Spiritual Care consult) other (see comments)  Family/Support System Care:   support provided   involvement promoted   caregiver stress acknowledged

## 2021-06-07 NOTE — PLAN OF CARE
Goal Outcome Evaluation:  Plan of Care Reviewed With: patient        Progress: no change  Outcome Summary: Initial OT evaluation completed, limitted to bed level d/t cognitive status and restraints. Pt presents w/ significant generalized weakness, impaired cognition and balance, and decreased activity tolerance/endurance warranting skilled OT services to promote return to PLOF. Therapist assisted nursing w/ bed mobility for hygiene and linen change, Pt was dependent for all bed mobility, toileting, grooming, and feeding (tube). Pt tolerated BUE AAROM HEP (1/10reps) in supine. Recommend d/c to SNF vs. LTACH pending medical needs.

## 2021-06-07 NOTE — PROGRESS NOTES
Clinical Nutrition     Reason for Visit: MDR, Follow-up protocol, EN    Patient Name: Laura Jang  YOB: 1965  MRN: [id number]  Date of Encounter: 06/07/21 11:17 EDT  Admission date: 6/1/2021    Nutrition Assessment   Admission Problem List:  Sepsis  UTI  BRITO cirrhosis  Transaminitis, improving  ABDI  Likely hepatorenal syndrome  Hyponatremia, improving  Metabolic acidosis  AMS  Alcohol abuse  Malnutrition  Clostridium bacteremia    Per nephrology (6/7)- planning on starting dialysis    PMH:   She  has a past medical history of Alcohol abuse, Anxiety, Arthritis, Blistering of skin (3/1/2020), Degenerative disc disease, lumbar, Depression, and Hypertension.  PSxH:  She  has a past surgical history that includes Bariatric Surgery; Hernia repair; Spine surgery; Cholecystectomy; Appendectomy; and Carpal tunnel release (Bilateral).      Applicable nutrition-related information:  (6/3) EN started per RD- Impact Peptide 1.5 at 50 ml/hr and no free water vie NJT    Reported/Observed/Food/Nutrition Related History:     EN on hold this AM for ultrasound of liver, has been tolerating EN. RN reports no FMS output this AM. Nephrology planning on starting dialysis.     Per I&O over the past 24 hrs:  FMS output= 800 ml  6 bowel movements    Anthropometrics   Height: 65 in  Weight: 205 lb per stated weight (6/1)  BMI: 34.1  BMI classification: Obese Class I: 30-34.9kg/m2   IBW: 125lb    Labs reviewed     Results from last 7 days   Lab Units 06/07/21  0257   SODIUM mmol/L 135*   POTASSIUM mmol/L 3.8   CHLORIDE mmol/L 101   CO2 mmol/L 20.0*   BUN mg/dL 74*   CREATININE mg/dL 4.97*   CALCIUM mg/dL 8.7   BILIRUBIN mg/dL 10.4*   ALK PHOS U/L 247*   ALT (SGPT) U/L 208*   AST (SGOT) U/L 368*   GLUCOSE mg/dL 93       Results from last 7 days   Lab Units 06/07/21  0935 06/07/21  0606 06/06/21  2314 06/06/21  1750 06/06/21  1202 06/06/21  0507   GLUCOSE mg/dL 114 80 139* 105 153* 129     Lab Results   Lab Value  Date/Time    HGBA1C 4.6 (L) 06/01/2021 2246    HGBA1C 5.20 03/25/2020 0853       Medications reviewed   Pertinent: pepcid, folic acid, insulin, lactulose, antibiotic, rifaxamin, florastor  GTT: levophed at 0.04 mcg/kg/min      Needs Assessment  (6/7)   Height used: 65 in/165 cm  Weight used: 205 lb/93 kg  IBW: 125 lb/57 kg    Estimated calorie needs: ~1500 kcal/day  14 kcal/kg actual weight= 1302 kcal  20-25 kcal/kg IBW= 5761-6916 kcal    Estimated protein needs: ~93 g pro/day -- may need to adjust once dialysis starts  1.0-1.2 g/kg actual weight=  g pro  2.0 g/kg IBW= 114 g pro      Current Nutrition Prescription     PO: NPO      EN: Impact Peptide 1.5 at 50 ml/hr (goal volume= 1000 ml/day) and no free water  Route: NJ  Verified at the bedside: N/A -- on hold this AM (6/7)  Provides at goal volume: 1500 kcal, 94 g pro, 0 g fiber, 48 meq K, 1000 mg phos, 770 ml water from EN/total      Average EN delivery:  3 Days:  941 ml, 94%  1412 kcal  88 g pro  0 g fiber  725 ml water from EN  841 ml water total      Nutrition Diagnosis   Date: 6-2-21, 6-7  Problem Predicted suboptimal energy intake   Etiology Per Clinical Status   Signs/Symptoms Pt found down at home, extremely weak   Status: EN started (6/3)    Nutrition Intervention    Follow treatment progress, Care plan reviewed   -RN reports no FMS output this AM, pt continues to receive lactulose  -Planning on starting dialysis  -Will continue with current EN order at this time. Once dialysis starts, may need to adjust EN to better meet pt's estimated needs, will monitor.       Goal:   General: Nutrition support treatment  EN: Maintain EN    Monitoring/Evaluation:   Per protocol, Pertinent labs, EN delivery/tolerance, Weight, Skin status, GI status, Symptoms, Hemodynamic stability    Monica Garcia MS RD/LD CNSC  Time Spent: 45 minutes

## 2021-06-08 PROBLEM — A41.9 SEPSIS (HCC): Status: ACTIVE | Noted: 2021-01-01

## 2021-06-08 NOTE — NURSING NOTE
Pt's sister (healthcare surrogate) requests that NO labs or antibiotics be given from this point on.

## 2021-06-08 NOTE — DISCHARGE SUMMARY
Discharge Summary    Patient name: Laura Jang  CSN: [id number]  MRN: [id number]  : 1965  Today's date: 2021     Date of Admission: 2021    Date of Discharge:  2021    Admitting Physician: LOYDA Garsia MD    Attending Physician: JEFFY Strong MD    Primary Care Provider: Renetta Santizo MD    Consultations:   Dr. Carlisle- Gastroenterlogy  Dr. English- Nephrology  DAVIAN Nam- Palliative Care    Admission Diagnosis:   Alcoholism (CMS/HCC)    Moderate episode of recurrent major depressive disorder (CMS/HCC)    Elevated lactic acid level    Hyponatremia    Hypokalemia    Hypochloremia    ABDI (acute kidney injury) (CMS/HCC)    Elevated lipase    Hypoproteinemia (CMS/HCC)    Elevated bilirubin    Leukocytosis    Anemia    UTI (urinary tract infection)    Discharge Diagnoses:     Sepsis and septic shock due to Gram negative bacteria.  Urinary tract origin (CMS/HCC)    Alcoholism (CMS/HCC)    Hyponatremia resolving    Hypokalemia    Elevated bilirubin    Leukocytosis    Severe anemia due to GI losses (source unknown)    E. coli UTI (urinary tract infection)    Acute renal failure (CMS/HCC)    Shock liver improved    Hypophosphatemia    Mild to moderate mitral stenosis on echocardiogram    Alcoholic cirrhosis (CMS/HCC)    Moderate episode of recurrent major depressive disorder (CMS/HCC)    Procedures:         History of Present Illness:  Ms. Jang is a 54 yo female with PMH extensive alcoholism, anxiety/depression, DDD, and HTN who presented to ED via EMS after being found down. According to patient and documentation, patient lost her job 4 weeks ago. Since then her drinking has worsened. About 2 weeks prior to admission she decided she needed to wean her self off alcohol and with the aid of her therapist has been doing fairly well. Her therapist changed one of her medications (unclear which one) from daily to BID and since then she has noticed an increase in lethargy/weakness.  On 2021 a friend  went to check on her after they hadn't heard from her in a few weeks. They found her down in her home surrounded by beer cans with fecal incontenance. EMS was called and she was brought to ED for evaluation. Patient notes the last time she took her new medication was 4 days pta.     VS on arrival: HR 87, tep 98.2, RR 18, /52, SpO2 96%. Labs of significance include: lipase 74, , Tylenol < 5, BNP 3736, lactate 9.7, Urine osmo 249, Serum osmo 251, covid negative, UA + ketones, bili, blood, protein, leuk esterase, nitrites, WBC and bacteria, UDS/ETOH/Salicylate/Tylenol all negative, glucose 124, BUN 18, creat 4.48, Na 107, K 3, Chloride 64, Co2 16, total protein 5.5, AST 2211, , Alk phos 139, bili 6.7, GFR < 10, anion gap 27, troponin 0.023, Mag 1.6, WBC 12.97, HgB 8.6, Plts 183.     CT Head Noncontributory. CXR with enlarged cardiac silhouette as well as increased central vascular congestion, no overt edema or pleural effusions.      In ED she was given a dose of Ceftriaxone. With her significant lab abnormalities as well as potential for decompensation, she was admitted to ICU for management.      On assessment the patient was alert and oriented. Scleral icterus, no abdominal distention, no significant ascites. VSS.    Hospital Course:  Laura Jang is a 56 y.o. female who presented to [facility] as discussed in HPI.  She was admitted to ICU and started on sepsis bundle.  Treatment for hyponatremia was initiated and due to acute kidney/liver injury, she was seen in consult by nephrology, and gastroenterology.  PT/INR was elevated at 3.68 which did begin to trend down.  Nephrology changed fluids to NS, and electrolytes were followed with replacement for hyponatremia, and hypophosphatemia.  The patient was cultured and received a dose of Zosyn but never received vancomycin.  Was also begun on fluids and pressors.  By 6/2/2021 patient seemed to feel better with fluids, pressors, and  antimicrobial therapy and lactic acid level decreased.  Mentation initially seemed to improve but when hematocrit dropped to 22 she required transfusion with PRBC which was associated with increased serum ammonia and increased confusion.  Urine cultures returned growing a sensitive E. coli and she was continued on Zosyn (a positive flora in her blood culture was felt to be contaminant). Nasal swab PCR however was positive for MRSA. Renal function remained compromised  and nephrology was asked to see.  Patient had remained a full code per her estranged , but after discussions between the  and the patient's daughter who is a physician practicing in [place], decisions were made to proceed with no CPR and DNI but continued limited support. She continued to require norepinephrine for blood pressure support and continued to have low UOP (450 cc out over 24 hours).  Creatinine continued to slowly drop and hospital day #5 was down to 4.22 (from 4.31), but BUN increased from 39 to 57 despite fluid balance being approximately 13 L positive since admission.  No arrhythmias or noted.  Gas exchange remained very good with O2 sats of 93% on room air.    She continued to be placed on BiPAP at night because of shallow rapid respirations and an attempt to rest her diaphragms.  She had a suboptimal cough.  Patient remained afebrile but WBC increased to 25.86 despite broad-spectrum coverage with Zyvox and Zosyn and (to cover positive MRSA PCR screen and potential pneumonia as well as Zosyn to cover E. coli UTI).  Transaminases continued to drop although bilirubin remained unchanged at 10.    Her INR returned to 1.79 (from a high of 3.68).  She continued to tolerate enteral nutrition at goal (Impact peptide 50 cc an hour).    Her encephalopathy remained (She will follow commands with loud verbal as well as tactile stimulation, but remained confused/encephalopathic and difficult to understand).  She remained on lactulose  "(with significant diarrhea requiring FMS) and rifaximin but by 6/6/2021 her serum ammonia increased from 56, to 120.  Patient started developing worsening renal failure and hypotension requiring pressor support.  Nephrology team recommended hemodialysis for continued  aggressive treatment.  We were planning to start patient on hemodialysis but after discussions with patient's family members including patient's sister who had the verbal release from patient's estranged  to make medical decisions and patient's son decision was made not to pursue hemodialysis.  Family told us that patient had very poor quality of life and and with her previous discussions and advanced directives this will not be in line with her goals of care.  They wanted to not pursue dialysis and focus more on comfort care when rest of the family is able to come see her.  She was seen in consult by palliative care and after further discussion it was decided to begin transition to comfort care/hospice.  She was accepted into inpatient hospice and will be transferred to their care on 6/8/2021.        Vitals:  /57 (BP Location: Left arm, Patient Position: Lying)   Pulse 90   Temp 97.7 °F (36.5 °C) (Bladder)   Resp 28   Ht 165.1 cm (65\")   Wt 93 kg (205 lb)   SpO2 96%   BMI 34.11 kg/m²     Advance Directives: Code Status and Medical Interventions:   Ordered at: 06/07/21 1448     Limited Support to NOT Include:    Intubation    Cardioversion/Defibrillation    Dialysis     Level Of Support Discussed With:    Health Care Surrogate    Next of Kin (If No Surrogate)     Code Status:    No CPR     Medical Interventions (Level of Support Prior to Arrest):    Limited        Physical Exam:  General Appearance: Eyes open, and mild respiratory distress with paradoxical breathing but without any acute distress  Head:    Atraumatic, Normocephalic, scleral icterus, Pupils reactive & symmetrical B/L.  Neck:     Supple.            Lungs:   B/L Breath " sounds present with decreased breath sounds on bases, no wheezing heard, occ crackles.   Heart: S1 and S2 present, no murmur\  Abdomen: Obese soft, nontender, no guarding or rigidity, bowel sounds hypoactive.  Extremities: Atraumatic, no cyanosis or clubbing,  + edema, warm to touch.  Pulses: Positive and symmetric.  Neurologic: Remains encephalopathic and wrist     restraints    Labs:  Results from last 7 days   Lab Units 06/07/21  0257   WBC 10*3/mm3 28.32*   HEMOGLOBIN g/dL 11.0*   HEMATOCRIT % 32.3*   PLATELETS 10*3/mm3 143     Results from last 7 days   Lab Units 06/07/21  0257   SODIUM mmol/L 135*   POTASSIUM mmol/L 3.8   CHLORIDE mmol/L 101   CO2 mmol/L 20.0*   BUN mg/dL 74*   CREATININE mg/dL 4.97*   CALCIUM mg/dL 8.7   BILIRUBIN mg/dL 10.4*   ALK PHOS U/L 247*   ALT (SGPT) U/L 208*   AST (SGOT) U/L 368*   GLUCOSE mg/dL 93         Magnesium   Date Value Ref Range Status   06/07/2021 2.0 1.6 - 2.6 mg/dL Final     Phosphorus   Date Value Ref Range Status   06/07/2021 3.4 2.5 - 4.5 mg/dL Final   06/06/2021 2.0 (L) 2.5 - 4.5 mg/dL Final                 Discharge Medications:     Discharge Medications        ASK your doctor about these medications        Instructions Start Date   acyclovir 200 MG capsule  Commonly known as: ZOVIRAX   Oral, Every 4 Hours While Awake, 400mg the first day and 200mg for 4 days.      acyclovir 5 % cream  Commonly known as: ZOVIRAX   Topical, Every 3 Hours      B-100 B-COMPLEX PO   Oral, Daily      BIOTIN 5000 PO   5,000 mcg, Oral, Daily      busPIRone 10 MG tablet  Commonly known as: BUSPAR   10 mg, Oral, 2 times daily      cetirizine 10 MG tablet  Commonly known as: zyrTEC   10 mg, Oral, Daily      clindamycin-benzoyl peroxide 1-5 % gel  Commonly known as: BENZACLIN   Topical, Daily      cloNIDine 0.1 MG tablet  Commonly known as: CATAPRES   0.1 mg, Oral, Daily      doxepin 10 MG capsule  Commonly known as: SINEquan   10 mg, Oral, Nightly      FLUoxetine 20 MG capsule  Commonly known  as: PROzac   20 mg, Oral, Daily      folic acid 1 MG tablet  Commonly known as: FOLVITE   1 mg, Oral, Daily      losartan 50 MG tablet  Commonly known as: COZAAR   50 mg, Oral, Daily      multivitamin with minerals tablet tablet   2,500 mcg, Oral, Daily      Multivitamin Gummies Womens chewable tablet   50 mg, Oral, Daily      Rexulti 0.5 MG tablet  Generic drug: Brexpiprazole   0.5 mg, Oral, Daily      vitamin B-12 1000 MCG tablet  Commonly known as: CYANOCOBALAMIN   3,000 mcg, Oral, Daily      vitamin D3 125 MCG (5000 UT) capsule capsule   5,000 Units, Oral, Daily               Discharge Diet:    NPO    Activity at Discharge:    Bedrest    Follow-up Appointments  No future appointments.       Discharge Instructions:  To Hospice for end of life care       DAVIAN Longoria, ACNP-BC  Pulmonary & Critical Care Medicine      Time: I spent 30 minutes on this discharge activity which included: face-to-face encounter with the patient, reviewing the data in the system, coordination of the care with the nursing staff as well as consultants, documentation, and entering orders.       CC: Renetta Santizo MD         [unfilled]

## 2021-06-08 NOTE — PROGRESS NOTES
Multidisciplinary Rounds    Time: 20min  Patient Name: Laura Jang  Date of Encounter: 06/08/21 09:52 EDT  MRN: [id number]  Admission date: 6/1/2021      Reason for visit: MDR. RD to continue to follow per protocol.     Additional information obtained during MDR: EN and keofeed d/c'd yesterday (6/7) per palliative. Planning for comfort focused plan of care and likely transitioning to hospice today. Clinical Nutrition will sign off, available if needed.     Current diet: NPO Diet    Intervention:  Follow treatment plan  Care plan reviewed    Follow up:   Per protocol      Monica Garcia MS RD/LD CNSC  09:52 EDT

## 2021-06-08 NOTE — PROGRESS NOTES
INFECTIOUS DISEASE Progress Note    Laura Jang  1965  [id number]    Admission Date: 6/1/2021      Requesting Provider: No ref. provider found  Evaluating Physician: Gianni Monahan MD    Reason for Consultation: Clostridium bacteremia    History of present illness:    6/6/21: Patient is a 56 y.o. female, with PMH alcoholism, cirrhosis, seen today for Clostridium bacteremia.  She was brought to the ED after being found down at home, surrounded by beer cans, and fecal incontinence.  She lost her job 4 weeks ago, and she increased her alcohol intake, but several weeks ago, with the help of her therapist, was trying to wean her consumption.  Admitting labs with , proBNP 3736, lAC 9.7, Scr 4.48, , AST 2211, , TBili, 6.7, WBC 21510 PCT 3.3,  UA TNTC WBCS, urine culture now with Ecoli. MRSA PCR positive.   CXR with increased vascular congestion, no overt edema, or pleural effusions. HCT no acute intracranial findings. Abdominal CT with severe hepatic steatosis, liver appears mildly nodular suggesting hepatic cirrhosis, increased attenuation in the mesenteric fat below aortic bifurcation, can be seen in mesenteric panniculitis, hepatic cirrhosis. Blood culture now positive for Clostridium species. She is currently on Zosyn, Rifaximin, and Linezolid.  She is currently confused/somnolent and cannot provide any reliable review of systems.    6/7/21: She has remained afebrile. She continues to require vasopressor support with Levophed. Her creatinine is 4.97 today.  Her bilirubin remains elevated at 10.4.  She has a coagulopathy with a  Prothrombin time of 17.7. Her white blood cell count remains markedly elevated at 28.3. She is unable to provide any reliable history due to her confusion.    6/8/21: She has remained confused.  She does continue to require vasopressor support.  She has been afebrile.  She may transition to hospice.    Past Medical History:   Diagnosis Date   • Alcohol abuse    •  Anxiety    • Arthritis    • Blistering of skin 3/1/2020   • Degenerative disc disease, lumbar    • Depression    • Hypertension        Past Surgical History:   Procedure Laterality Date   • APPENDECTOMY     • BARIATRIC SURGERY      2005, gastric sleeve   • CARPAL TUNNEL RELEASE Bilateral     07/2020   • CHOLECYSTECTOMY     • HERNIA REPAIR      2014   • SPINE SURGERY      cyst removal       Family History   Problem Relation Age of Onset   • Arthritis Mother    • Cancer Mother    • Obesity Mother    • Mental illness Mother    • Hypertension Mother    • Arthritis Sister    • Obesity Sister    • Mental illness Sister    • Hypertension Sister    • Obesity Brother    • Mental illness Brother    • Hypertension Brother    • Cancer Maternal Grandmother    • Breast cancer Neg Hx    • Ovarian cancer Neg Hx        Social History     Socioeconomic History   • Marital status:      Spouse name: Not on file   • Number of children: Not on file   • Years of education: Not on file   • Highest education level: Not on file   Tobacco Use   • Smoking status: Never Smoker   • Smokeless tobacco: Never Used   Substance and Sexual Activity   • Alcohol use: Yes     Alcohol/week: 6.0 standard drinks     Types: 6 Cans of beer per week     Comment: quit 02/18/2020, prior heavy alcohol use for 15y   • Drug use: Not Currently   • Sexual activity: Not Currently     Partners: Male     Birth control/protection: Post-menopausal       No Known Allergies      Medication:    Current Facility-Administered Medications:   •  dextrose (D50W) 25 g/ 50mL Intravenous Solution 25 g, 25 g, Intravenous, Q15 Min PRN, Wilmer Garsia MD, 25 g at 06/02/21 1220  •  folic acid 1 mg in sodium chloride 0.9 % 50 mL IVPB, 1 mg, Intravenous, Daily, Wilmer Garsia MD, Last Rate: 100 mL/hr at 06/07/21 0806, 1 mg at 06/07/21 0806  •  HYDROmorphone (DILAUDID) injection 0.5 mg, 0.5 mg, Intravenous, Q3H PRN, Jose Colón APRN, 0.5 mg at  06/07/21 2335  •  insulin regular (humuLIN R,novoLIN R) injection 0-7 Units, 0-7 Units, Subcutaneous, Q6H, Wilmer Garsia MD, 2 Units at 06/06/21 1334  •  Linezolid (ZYVOX) 600 mg 300 mL, 600 mg, Intravenous, Q12H, Candido Capps MD, Last Rate: 300 mL/hr at 06/07/21 2231, 600 mg at 06/07/21 2231  •  norepinephrine (LEVOPHED) 8 mg in 250 mL NS infusion (premix), 0.02-0.3 mcg/kg/min, Intravenous, Titrated, Wilmer Garsia MD, Last Rate: 6.98 mL/hr at 06/07/21 2155, 0.04 mcg/kg/min at 06/07/21 2155  •  pantoprazole (PROTONIX) injection 40 mg, 40 mg, Intravenous, Q AM, Meño Strong MD, 40 mg at 06/08/21 0547  •  piperacillin-tazobactam (ZOSYN) 3.375 g in iso-osmotic dextrose 50 ml (premix), 3.375 g, Intravenous, Q12H, Sofya Calderon, PharmD, 3.375 g at 06/07/21 1724  •  sodium chloride 0.9 % flush 10 mL, 10 mL, Intravenous, Q12H, Raquel Vega APRN, 10 mL at 06/07/21 2231  •  sodium chloride 0.9 % flush 10 mL, 10 mL, Intravenous, PRN, Raquel Vega APRN  •  thiamine (B-1) 200 mg in sodium chloride 0.9 % 100 mL IVPB, 200 mg, Intravenous, Daily, Meño Strong MD, Last Rate: 200 mL/hr at 06/07/21 1310, 200 mg at 06/07/21 1310    Antibiotics:  Anti-Infectives (From admission, onward)    Ordered     Dose/Rate Route Frequency Start Stop    06/04/21 1018  Linezolid (ZYVOX) 600 mg 300 mL     Hawa Butler, PharmD reviewed the order on 06/05/21 1119.   Ordering Provider: Candido Capps MD    600 mg  300 mL/hr over 60 Minutes Intravenous Every 12 Hours Scheduled 06/04/21 1115 06/11/21 0859    06/02/21 0841  piperacillin-tazobactam (ZOSYN) 3.375 g in iso-osmotic dextrose 50 ml (premix)     Sofya Calderon, PharmD reviewed the order on 06/04/21 1039.   Ordering Provider: Sofya Calderon, PharmD    3.375 g  over 4 Hours Intravenous Every 12 Hours Scheduled 06/02/21 1800 06/19/21 1759    06/01/21 2032  cefTRIAXone (ROCEPHIN) 1 g/100 mL 0.9% NS (MBP)     Ordering Provider: Yoli Barrera, APRN    1  g  over 30 Minutes Intravenous Once 21 2596            Review of Systems:  No reliable ros from patient       Physical Exam:   Vital Signs  Temp (24hrs), Av.5 °F (36.4 °C), Min:96.8 °F (36 °C), Max:98 °F (36.7 °C)    Temp  Min: 96.8 °F (36 °C)  Max: 98 °F (36.7 °C)  BP  Min: 92/47  Max: 119/63  Pulse  Min: 82  Max: 94  Resp  Min: 22  Max: 24  SpO2  Min: 93 %  Max: 100 %    GENERAL: drowsy  in no acute distress.  She is jaundiced.and debilitated appearing  HEENT: Normocephalic, atraumatic.  PERRL. EOMI. No conjunctival injection.she has scleral icterus.  Oropharynx clear without evidence of thrush or exudate.   NECK: Supple   HEART: RRR; 1/2 murmur, rubs, gallops.   LUNGS: Clear to auscultation bilaterally without wheezing, rales, rhonchi. Normal respiratory effort. Nonlabored. No dullness.  ABDOMEN: Obese and distended.positive bowel sounds. No rebound or guarding.   EXT:  No cyanosis, clubbing or edema. .  :  A Nunes catheter is present  MSK:no joint effusions   SKIN: jaundiced .    NEURO: She is somnolent and difficult to arouse.  She is confused.  She moves all of her extremities.      Laboratory Data    Results from last 7 days   Lab Units 21  02521  0342 21  0321   WBC 10*3/mm3 28.32* 25.86* 23.15*   HEMOGLOBIN g/dL 11.0* 10.6* 11.0*   HEMATOCRIT % 32.3* 30.8* 31.6*   PLATELETS 10*3/mm3 143 165 179     Results from last 7 days   Lab Units 21  0257   SODIUM mmol/L 135*   POTASSIUM mmol/L 3.8   CHLORIDE mmol/L 101   CO2 mmol/L 20.0*   BUN mg/dL 74*   CREATININE mg/dL 4.97*   GLUCOSE mg/dL 93   CALCIUM mg/dL 8.7     Results from last 7 days   Lab Units 21  0342   ALK PHOS U/L 247* 247*   BILIRUBIN mg/dL 10.4* 10.6*   BILIRUBIN DIRECT mg/dL  --  8.6*   ALT (SGPT) U/L 208* 260*   AST (SGOT) U/L 368* 488*     Results from last 7 days   Lab Units 21  0702   SED RATE mm/hr 5     Results from last 7 days   Lab Units 21  1806   CRP mg/dL  5.65*     Results from last 7 days   Lab Units 06/06/21  0342   LACTATE mmol/L 2.0     Results from last 7 days   Lab Units 06/01/21  1736   CK TOTAL U/L 668*     Results from last 7 days   Lab Units 06/02/21  0553   VANCOMYCIN RM mcg/mL <4.00*     Estimated Creatinine Clearance: 14.1 mL/min (A) (by C-G formula based on SCr of 4.97 mg/dL (H)).      Microbiology:  Blood Culture   Date Value Ref Range Status   06/01/2021 No growth at 4 days  Preliminary     No results found for: BCIDPCR, CXREFLEX, CSFCX, CULTURETIS  No results found for: CULTURES, HSVCX, URCX  No results found for: EYECULTURE, GCCX, HSVCULTURE, LABHSV  No results found for: LEGIONELLA, MRSACX, MUMPSCX, MYCOPLASCX  No results found for: NOCARDIACX, STOOLCX  Urine Culture   Date Value Ref Range Status   06/01/2021 >100,000 CFU/mL Escherichia coli (A)  Final     No results found for: VIRALCULTU, WOUNDCX        Radiology:  Imaging Results (Last 72 Hours)     Procedure Component Value Units Date/Time    US Liver [[id number]] Collected: 06/07/21 1118     Updated: 06/07/21 1720    Narrative:      EXAMINATION: US LIVER-     INDICATION: Please evaluate for ascites (four quadrants); alcoholic  hepatitis; please also evaluate entire liver for any lesions;  N17.9-Acute kidney failure, unspecified; E87.8-Cdwk-tpnzrlzale and  hyponatremia; E87.6-Hypokalemia; D64.9-Anemia, unspecified;  F10.20-Alcohol dependence, uncomplicated.      TECHNIQUE: Ultrasound right upper quadrant abdomen and liver.     COMPARISON: None.     FINDINGS: Visualized portions of the pancreas is within normal limits.     Liver demonstrates coarsened echotexture with nodular contours of  cirrhotic hepatic morphology with adjacent ascites in all four quadrants  noted on limiting.     Gallbladder is surgically absent.     Common bile duct 7 mm in diameter.     Right kidney 10.2 cm in length without evidence of hydronephrosis,  contour deforming mass or obvious calculi.       Impression:       Cirrhotic hepatic morphology with ascites in all four  quadrants.     D:  06/07/2021  E:  06/07/2021         This report was finalized on 6/7/2021 5:17 PM by Dr. Denny Martinez.       XR Chest 1 View [[id number]] Collected: 06/07/21 0830     Updated: 06/07/21 1543    Narrative:      EXAMINATION: XR CHEST 1 VW-      INDICATION: Respiratory failure; N17.9-Acute kidney failure,  unspecified; E87.0-Asbj-gfxmdbeohu and hyponatremia; E87.6-Hypokalemia;  D64.9-Anemia, unspecified; F10.20-Alcohol dependence, uncomplicated.      COMPARISON: 06/05/2021.     FINDINGS: Portable chest reveals heart to be borderline enlarged with  prominence of the pulmonary vascularity. Mild elevation identified of  the right hemidiaphragm. Degenerative change is seen within the spine.  No new focal parenchymal opacification is present.           Impression:      Prominence of the pulmonary vascularity. The heart is  enlarged. Lines and tubes in satisfactory position. Small left pleural  effusion.     D:  06/07/2021  E:  06/07/2021     This report was finalized on 6/7/2021 3:39 PM by Dr. Leni Downey MD.       XR Chest 1 View [[id number]] Collected: 06/05/21 0944     Updated: 06/05/21 1554    Narrative:         EXAMINATION: XR CHEST 1 VW - 06/05/2021     INDICATION: N17.9-Acute kidney failure, unspecified;  E87.5-Aocz-gwtiajexaj and hyponatremia; E87.6-Hypokalemia; D64.9-Anemia,  unspecified; F10.20-Alcohol dependence, uncomplicated. Respiratory  failure.     COMPARISON: 06/04/2021     FINDINGS: Portable chest reveals lines and tubes to be in satisfactory  position. Some improvement seen in aeration the left lung base.  Increased markings seen diffusely throughout the lung fields.  Degenerative changes seen within the spine. Small left pleural effusion.           Impression:      Ill-defined opacification seen at the left lung base with  small left pleural effusion and prominence of the pulmonary vascularity  bilaterally.     DICTATED:    06/05/2021  EDITED/ls :   06/05/2021     This report was finalized on 6/5/2021 3:51 PM by Dr. Leni Downey MD.               Impression:   1.  Septic shock-with hypotension, hypothermia (temperature = 35.5), toxic/metabolic encephalopathy, PCT 3.31, lactic acidosis, acute renal failure, transaminitis, and Clostridium bacteremia.  2.  Ecoli UTI  3.  Clostridium bacteremia-her Clostridium bacteremia is most likely secondary to disruption of bowel mucosal integrity due to her shock.  She appears to have had severe hypovolemic/septic shock and I suspect that she had some secondary mesenteric ischemia.  Some species of Clostridium such as Clostridium septicum can be associated with colorectal cancer or other bowel mucosal defects.  4.  Elevated LFTs  5.  Hyperbilirubinemia  6.  Hyponatremia-severe  7.  Alcoholism- with cirrhosis  8.  Rhabdomyolysis   9.  MRSA colonization  10.  Left lower lobe atelectasis/infiltrate  11.  Hepatic encephalopathy  12.  Acute renal failure/ATN  13.  Coagulopathy     PLAN/RECOMMENDATIONS:   1.  Transition to hospice  2.  Discontinue antibiotic therapy    She has an extremely high mortality rate with multi-organ system failure.  Hospice is appropriate        Gianni Monahan MD  6/8/2021  06:57 EDT

## 2021-06-08 NOTE — PROGRESS NOTES
Family has decided to stop all active treatment and pursue hospice care.  Met with patient's son who arrived today morning.  He had no further questions at this point.  Patient appears comfortable.  Hospice team is taking over.

## 2021-06-08 NOTE — PLAN OF CARE
Goal Outcome Evaluation:               Neuro- disoriented to time, place, situation; MONTES equally. Resp- sats >95% on RA, no IS. Cardiac- NSR, discontinuing Levo when son arrives this AM, Levo titrated to keep SBP >100. GI/- 200ml out of FMS, 75ml out of mckeon, normoactive BS. Skin has multiple issues, see charting, WOC following. Sister is in charge of healthcare decisions at this time. Planned to transition to hospice today.

## 2021-06-08 NOTE — PROGRESS NOTES
"   LOS: 7 days    Patient Care Team:  Renetta Santizo MD as PCP - General (Internal Medicine)    Reason For Visit:  F/U ABDI  Subjective           Review of Systems: UNOBTAINABLE      Objective     folic acid (FOLVITE) IVPB, 1 mg, Intravenous, Daily  insulin regular, 0-7 Units, Subcutaneous, Q6H  Linezolid, 600 mg, Intravenous, Q12H  pantoprazole, 40 mg, Intravenous, Q AM  piperacillin-tazobactam, 3.375 g, Intravenous, Q12H  sodium chloride, 10 mL, Intravenous, Q12H  thiamine (VITAMIN B1) IVPB, 200 mg, Intravenous, Daily      norepinephrine, 0.02-0.3 mcg/kg/min, Last Rate: 0.04 mcg/kg/min (06/07/21 2155)          Vital Signs:  Blood pressure 109/57, pulse 90, temperature 97.7 °F (36.5 °C), temperature source Bladder, resp. rate 28, height 165.1 cm (65\"), weight 93 kg (205 lb), SpO2 96 %, not currently breastfeeding.    Flowsheet Rows      First Filed Value   Admission Height  165.1 cm (65\") Documented at 06/01/2021 1730   Admission Weight  93 kg (205 lb) Documented at 06/01/2021 1730          06/07 0701 - 06/08 0700  In: 2141 [I.V.:1535]  Out: 535 [Urine:185]    Physical Exam:    General Appearance: NAD. UNRESPONSIVE  Eyes: PER, conjunctivae and sclerae normal, no icterus  Lungs: RHONCHI  Heart/CV: regular rhythm & normal rate, no murmur, no gallop, no rub and + edema  Abdomen:  distended, soft, non-tender, no masses,  bowel sounds present  Skin: No rash, Warm and dry    Radiology:            Labs:  Results from last 7 days   Lab Units 06/07/21  0257 06/06/21 0342 06/05/21 0321   WBC 10*3/mm3 28.32* 25.86* 23.15*   HEMOGLOBIN g/dL 11.0* 10.6* 11.0*   HEMATOCRIT % 32.3* 30.8* 31.6*   PLATELETS 10*3/mm3 143 165 179     Results from last 7 days   Lab Units 06/07/21  0257 06/06/21  0342 06/05/21  0321 06/04/21  1755 06/04/21  0317 06/03/21  0827   SODIUM mmol/L 135* 134* 133* 129* 125* 121*  122*   POTASSIUM mmol/L 3.8 3.1* 2.9* 3.3* 2.9* 3.5   CHLORIDE mmol/L 101 99 95* 92* 88* 82*   CO2 mmol/L 20.0* 21.0* 22.0 " 21.0* 22.0 19.0*   BUN mg/dL 74* 57* 39* 34* 28* 24*   CREATININE mg/dL 4.97* 4.22* 4.31* 4.31* 4.69* 4.92*   CALCIUM mg/dL 8.7 8.7 8.9 9.2 9.2 8.8   PHOSPHORUS mg/dL 3.4 2.0* 1.6* 0.6* 0.5* 1.6*   MAGNESIUM mg/dL 2.0  --  1.9  --  2.3 2.4   ALBUMIN g/dL 2.70* 2.70* 2.90* 3.10* 3.30* 3.60     Results from last 7 days   Lab Units 06/07/21  0257   GLUCOSE mg/dL 93       Results from last 7 days   Lab Units 06/07/21  0257 06/06/21  0342   ALK PHOS U/L 247* 247*   BILIRUBIN mg/dL 10.4* 10.6*   BILIRUBIN DIRECT mg/dL  --  8.6*   ALT (SGPT) U/L 208* 260*   AST (SGOT) U/L 368* 488*     Results from last 7 days   Lab Units 06/05/21  0312   PH, ARTERIAL pH units 7.447   PO2 ART mm Hg 68.5*   PCO2, ARTERIAL mm Hg 32.1*   HCO3 ART mmol/L 22.1       Results from last 7 days   Lab Units 06/01/21  1933   COLOR UA  [place]*   CLARITY UA  [place]*   PH, URINE  5.5   SPECIFIC GRAVITY, URINE  1.013   GLUCOSE UA  Negative   KETONES UA  Trace*   BILIRUBIN UA  Small (1+)*   PROTEIN UA  30 mg/dL (1+)*   BLOOD UA  Small (1+)*   LEUKOCYTES UA  Large (3+)*   NITRITE UA  Positive*       Estimated Creatinine Clearance: 14.1 mL/min (A) (by C-G formula based on SCr of 4.97 mg/dL (H)).      Assessment       Sepsis and septic shock due to Gram negative bacteria.  Urinary tract origin (CMS/HCC)    Alcoholism (CMS/HCC)    Moderate episode of recurrent major depressive disorder (CMS/HCC)    Lactic acidosis resolved    Hyponatremia resolving    Hypokalemia    Elevated bilirubin    Leukocytosis    Severe anemia due to GI losses (source unknown)    E. coli UTI (urinary tract infection)    Acute renal failure (CMS/HCC)    Shock liver improved    Hypophosphatemia    Mild to moderate mitral stenosis on echocardiogram    Alcoholic cirrhosis (CMS/HCC)            Impression: OLIGURIC ABDI.  LIVER DZ.            Recommendations: FAMILY HAS DECIDED COMFORT ONLY AND NO RRT. WILL SIGN OFF. D/W WITH FAMILY AND DR OAKLEY.      Michael English,  MD  06/08/21  08:31 EDT

## 2021-06-08 NOTE — PROGRESS NOTES
I visited patient per palliative referral.  Patient opened her eyes on occasion and smiled but was generally uncommunicative.  I spent time with patient's sister(Davin) at bedside, providing empathic listening and pastoral support.  She has support from her  and local [employer].  She is also very familiar with hospice, having sat with her parents and a grandparent at EOL.

## 2021-06-08 NOTE — DISCHARGE PLACEMENT REQUEST
"Suhail Radha (56 y.o. Female)     Date of Birth Social Security Number Address Home Phone MRN    1965  [id number] [id number] [phone] [id number]    L[employer]-day Marital Status          Worship        Admission Date Admission Type Admitting Provider Attending Provider Department, Room/Bed    6/1/21 Emergency Wilmer Garsia MD Thompson, Patton Weddington, MD [facility] 2HSI, S260/1    Discharge Date Discharge Disposition Discharge Destination                       Attending Provider: Wilmer Garsia MD    Allergies: No Known Allergies    Isolation: None   Infection: MRSA (06/02/21)   Code Status: No CPR    Ht: 165.1 cm (65\")   Wt: 93 kg (205 lb)    Admission Cmt: None   Principal Problem: Sepsis and septic shock due to Gram negative bacteria.  Urinary tract origin (CMS/[facility]) [A41.50]                 Active Insurance as of 6/1/2021     Primary Coverage     Payor Plan Insurance Group Employer/Plan Group    [facility] PPO [id number]     Payor Plan Address Payor Plan Phone Number Payor Plan Fax Number Effective Dates    [address] [phone]  1/1/2020 - None Entered    [facility] [id number]       Subscriber Name Subscriber Birth Date Member ID       RADHA PATIÑO 1965 [id number]           Secondary Coverage     Payor Plan Insurance Group Employer/Plan Group    [facility]      Payor Plan Address Payor Plan Phone Number Payor Plan Fax Number Effective Dates    [address] [phone]  3/25/2020 - None Entered    [facility] [address]       Subscriber Name Subscriber Birth Date Member ID       RADHA PATIÑO 1965 [id number]                 Emergency Contacts      (Rel.) Home Phone Work Phone Mobile Phone    MARAL BOSWELL (Sister) [phone] -- --    clarence patiño (Spouse) [phone] -- --            Emergency Contact Information     Name Relation Home Work Mobile    ANDREIA" MARAL Sister [phone]      clarence jang Spouse [phone]            Insurance Information                [employer] PPO Phone: [phone]    Subscriber: Laura Jang Subscriber#: [id number]    Group#: [id number] Precert#: [id number]        / [place] Phone: [phone]    Subscriber: Laura Jang Subscriber#: [id number]    Group#:  Precert#:              History & Physical      Wilmer Garsia MD at 06/01/21 2122            INTENSIVIST / PULMONARY INITIAL VISIT (CONSULT / H&P) NOTE     Hospital:  LOS: 0 days   Ms. Laura Jang, 55 y.o. female is followed for:   Chief Complaint   Patient presents with   • Weakness - Generalized   • Hypotension   • Hypoglycemia   • Alcohol Problem       Alcoholism (CMS/HCC)    Moderate episode of recurrent major depressive disorder (CMS/HCC)    Elevated lactic acid level    Hyponatremia    Hypokalemia    Hypochloremia    ABDI (acute kidney injury) (CMS/HCC)    Elevated lipase    Hypoproteinemia (CMS/HCC)    Elevated bilirubin    Leukocytosis    Anemia    UTI (urinary tract infection)         History of Present Illness   Ms. Jang is a 54 yo female with PMH extensive alcoholism, anxiety/depression, DDD and HTN who presented to ED via EMS after being found down. According to patient and documentation, patient lost her job 4 weeks ago. Since then her drinking has worsened. About 2 weeks ago she decided she needed to wean her self off alcohol and with the aid of her therapist has been doing fairly well. Her therapist changed one of her medications (unclear which one) from daily to BID and since then she has noticed an increase in lethargy/weakness. Today a friend went to check on her after they hadn't heard from her in a few weeks. The found her down in her home surrounded by beer cans with fecal incontenance. EMS was called and she was brought to ED for evaluation. Patient notes the last time she took her new  medication was 4 days ago.    VS on arrival: HR 87, tep 98.2, RR 18, /52, SpO2 96%. Labs of significance include: lipase 74, , Tylenol < 5, BNP 3736, lactate 9.7, Urine osmo 249, Serum osmo 251, covid negative, UA + ketones, bili, blood, protein, leuk esterase, nitrites, WBC and bacteria, UDS/ETOH/Salicylate/Tylenol all negative, glucose 124, BUN 18, creat 4.48, Na 107, K 3, Chloride 64, Co2 16, total protein 5.5, AST 2211, , Alk phos 139, bili 6.7, GFR < 10, anion gap 27, troponin 0.023, Mag 1.6, WBC 12.97, HgB 8.6, Plts 183.    CT Head Noncontributory. CXR with enlarged cardiac silhouette as well as increased central vascular congestion, no overt edema or pleural effusions.     In ED she was given a dose of Ceftriaxone. With her significant lab abnormalities as well as potential for decompensation, she will be admitted to ICU for management.     On my assessment patient is alert and oriented. Scleral icterus, no abdominal distention, no significant ascites. VSS.    Past Medical History:   Diagnosis Date   • Alcohol abuse    • Anxiety    • Arthritis    • Blistering of skin 3/1/2020   • Degenerative disc disease, lumbar    • Depression    • Hypertension      Past Surgical History:   Procedure Laterality Date   • APPENDECTOMY     • BARIATRIC SURGERY      2005, gastric sleeve   • CARPAL TUNNEL RELEASE Bilateral     07/2020   • CHOLECYSTECTOMY     • HERNIA REPAIR      2014   • SPINE SURGERY      cyst removal     Family History   Problem Relation Age of Onset   • Arthritis Mother    • Cancer Mother    • Obesity Mother    • Mental illness Mother    • Hypertension Mother    • Arthritis Sister    • Obesity Sister    • Mental illness Sister    • Hypertension Sister    • Obesity Brother    • Mental illness Brother    • Hypertension Brother    • Cancer Maternal Grandmother    • Breast cancer Neg Hx    • Ovarian cancer Neg Hx      Social History     Socioeconomic History   • Marital status:       Spouse name: Not on file   • Number of children: Not on file   • Years of education: Not on file   • Highest education level: Not on file   Tobacco Use   • Smoking status: Never Smoker   • Smokeless tobacco: Never Used   Substance and Sexual Activity   • Alcohol use: Yes     Alcohol/week: 6.0 standard drinks     Types: 6 Cans of beer per week     Comment: quit 02/18/2020, prior heavy alcohol use for 15y   • Drug use: Not Currently   • Sexual activity: Not Currently     Partners: Male     Birth control/protection: Post-menopausal     No Known Allergies  No current facility-administered medications on file prior to encounter.     Current Outpatient Medications on File Prior to Encounter   Medication Sig Dispense Refill   • BIOTIN 5000 PO Take 5,000 mcg by mouth Daily.     • Brexpiprazole (Rexulti) 0.5 MG tablet Take 0.5 mg by mouth Daily.     • busPIRone (BUSPAR) 10 MG tablet Take 10 mg by mouth 2 (two) times a day.     • cetirizine (zyrTEC) 10 MG tablet Take 10 mg by mouth Daily.     • Cholecalciferol (VITAMIN D3) 125 MCG (5000 UT) capsule capsule Take 5,000 Units by mouth Daily.     • cloNIDine (CATAPRES) 0.1 MG tablet Take 0.1 mg by mouth Daily.     • doxepin (SINEquan) 10 MG capsule Take 10 mg by mouth Every Night.     • FLUoxetine (PROzac) 20 MG capsule Take 20 mg by mouth Daily.     • folic acid (FOLVITE) 1 MG tablet Take 1 tablet by mouth Daily. 90 tablet 1   • losartan (COZAAR) 50 MG tablet Take 1 tablet by mouth Daily. 30 tablet 0   • vitamin B-12 (CYANOCOBALAMIN) 1000 MCG tablet Take 3,000 mcg by mouth Daily.     • acyclovir (ZOVIRAX) 200 MG capsule Take  by mouth Every 4 (Four) Hours While Awake. 400mg the first day and 200mg for 4 days.     • acyclovir (ZOVIRAX) 5 % cream Apply  topically to the appropriate area as directed Every 3 (Three) Hours.     • B Complex-Biotin-FA (B-100 B-COMPLEX PO) Take  by mouth Daily.     • clindamycin-benzoyl peroxide (BENZACLIN) 1-5 % gel Apply  topically to the appropriate  area as directed Daily. 50 g 11   • Multiple Vitamins-Minerals (HAIR SKIN AND NAILS FORMULA PO) Take 2,500 mcg by mouth Daily.     • Multiple Vitamins-Minerals (MULTIVITAMIN GUMMIES WOMENS) chewable tablet Chew 50 mg Daily.     • [DISCONTINUED] magnesium, as, gluconate (MAGONATE) 500 (27 Mg) MG tablet Take 125 mg by mouth 2 (Two) Times a Day.     • [DISCONTINUED] naltrexone (DEPADE) 50 MG tablet          ROS:  Per HPI, all other systems were reviewed and were negative        OBJECTIVE     Vital Sign Min/Max for last 24 hours  Temp  Min: 98.2 °F (36.8 °C)  Max: 98.2 °F (36.8 °C)   BP  Min: 92/52  Max: 116/41   Pulse  Min: 87  Max: 99   Resp  Min: 18  Max: 18   SpO2  Min: 87 %  Max: 100 %   No data recorded     Telemetry:              General Appearance:  Conversant, in no acute distress  Eyes:  Scleral icterus  Ears, Nose, Mouth, Throat:  Atraumatic, oropharynx clear  Neck:  Trachea midline, thyroid normal  Respiratory:  Clear to auscultation bilaterally, normal effort, no tenderness to palpation  Cardiovascular:  Regular rate and rhythm, no murmurs, no peripheral edema, no thrill  Gastrointestinal:  Soft, nontender, nondistended, no hepatosplenomegaly  Skin:  Normal temperature, no rash  Psychiatric:  Alert and oriented, though confused  Neuro:  No new focal neurologic deficits observed, no tremor or tongue fasiculations    SpO2: 100 % SpO2  Min: 87 %  Max: 100 %   Device:      Flow Rate:   No data recorded     Mechanical Ventilator Settings:                                         Intake/Ouptut 24 hrs (7:00AM - 6:59 AM)  Intake & Output (last 3 days)       05/30 0701 - 05/31 0700 05/31 0701 - 06/01 0700 06/01 0701 - 06/02 0700    I.V. (mL/kg)   200 (2.2)    Total Intake(mL/kg)   200 (2.2)    Net   +200                   Lines, Drains & Airways    Active LDAs     Name:   Placement date:   Placement time:   Site:   Days:    Peripheral IV 06/01/21 1656 Left Antecubital   06/01/21 1656    Antecubital   less than 1     Peripheral IV 06/01/21 1958 Right Antecubital   06/01/21 1958    Antecubital   less than 1                Hematology:  Results from last 7 days   Lab Units 06/01/21  1736   WBC 10*3/mm3 12.97*   HEMOGLOBIN g/dL 8.6*   HEMATOCRIT % 25.0*   PLATELETS 10*3/mm3 183   MONOCYTES % % 6.0     Electrolytes, Magnesium and Phosphorus:  Results from last 7 days   Lab Units 06/01/21  1736   SODIUM mmol/L 107*   POTASSIUM mmol/L 3.0*   CO2 mmol/L 16.0*   MAGNESIUM mg/dL 1.6   PHOSPHORUS mg/dL 4.3     Renal:  Results from last 7 days   Lab Units 06/01/21  1736   CREATININE mg/dL 4.48*   BUN mg/dL 18     Estimated Creatinine Clearance: 15.8 mL/min (A) (by C-G formula based on SCr of 4.48 mg/dL (H)).  Estimated Creatinine Clearance: 15.8 mL/min (A) (by C-G formula based on SCr of 4.48 mg/dL (H)).  Hepatic:  Results from last 7 days   Lab Units 06/01/21  1736   ALK PHOS U/L 139*   BILIRUBIN mg/dL 6.7*   ALT (SGPT) U/L 453*   AST (SGOT) U/L 2,211*     Arterial Blood Gases:              Lab Results   Component Value Date    LACTATE 9.7 (C) 06/01/2021       CT Head Without Contrast    Result Date: 6/1/2021  Narrative: EXAMINATION: CT HEAD WO CONTRAST-  INDICATION: Mental status change, unknown cause  TECHNIQUE: CT head without intravenous contrast  The radiation dose reduction device was turned on for each scan per the ALARA (As Low as Reasonably Achievable) protocol.  COMPARISON: NONE  FINDINGS: Midline structures are symmetric without evidence of mass, mass effect or midline shift. Ventricles and sulci within normal limits. No intra-axial major extra-axial fluid collection. Globes and orbits unremarkable. Paranasal sinuses and mastoid cells are grossly clear well-pneumatized. Calvarium intact.        Impression: No acute intracranial findings.       XR Chest 1 View    Result Date: 6/1/2021  Narrative:  EXAMINATION: XR CHEST 1 VW-  INDICATION: Weak/Dizzy/AMS triage protocol  COMPARISON: NONE  FINDINGS: Cardiac size enlarged with  "increased perihilar lung markings of central vascular congestion without overt edema or pleural effusion        Impression: Cardiac size enlarged with increased central vascular congestion however no overt edema or pleural effusion of decompensation evident         Relevant imaging studies and labs from 06/01/21 were reviewed and interpreted by me    Medications (drips):  Pharmacy Consult - Pharmacy to dose  Pharmacy to dose vancomycin  sodium chloride        cefTRIAXone, 1 g, Intravenous, Once  pantoprazole, 40 mg, Intravenous, Q12H  [START ON 6/2/2021] piperacillin-tazobactam, 2.25 g, Intravenous, Q8H  piperacillin-tazobactam, 3.375 g, Intravenous, Once  sodium chloride, 10 mL, Intravenous, Q12H  vancomycin, 20 mg/kg, Intravenous, Once        Assessment/Plan   IMPRESSION / PLAN     Inpatient Problem List:  55 y.o.female:  Active Hospital Problems    Diagnosis    • Elevated lactic acid level    • Hyponatremia    • Hypokalemia    • Hypochloremia    • ABDI (acute kidney injury) (CMS/HCC)    • Elevated lipase    • Hypoproteinemia (CMS/HCC)    • Elevated bilirubin    • Leukocytosis    • Anemia    • UTI (urinary tract infection)    • Moderate episode of recurrent major depressive disorder (CMS/HCC)    • Alcoholism (CMS/HCC)         Impression:  55 y.o.female with relevant PMH of Alcoholism, depression, HTN, Obesity admitted 6/1/2021 after being \"found down\" surrounded by beer cans and with incontinence.  Found to have numerous abnormalities on labs:  Severe hyponatremia, ABDI, severe elevation in LFTs, lactic acidosis, +UA.    Overall presentation concerning for severe sepsis w/ likely urinary source, complicated by ABDI, Rhabdo, Shock liver +/- Alcoholic Hepatitis, AKA + Lactic Acidosis, Macrocytic anemia w/ hgb 8.6, and Severe Hyponatremia.  I suspect the hyponatremia is from beer potomania.  Serum Osm / Urine Osm ration not c/w SIADH, TSH & Cortisol normal.  Could also be from more chronic advanced liver disease, " "clearly has fatty liver on CT.    Plan:  Severe Sepsis - BS abx, pan-culture, MRSA PCR.  Likely urinary source.  Though alk phos / bili elevated, has previously had cholecystectomy.  Amylase / Lipase normal. No evidence for pneumonia on CT Abd or CXR.      ABDI - mckeon, urine studies, IVF    Severe Hyponatremia - as above suspect beer potomania / liver disease.  Give isotonic crystalloid.  Goal sodium 113-115 in 24 hours.  If she overcorrects will need D5W bolus.  Check sodium q2h overnight.    Severe Metabolic Acidosis / Lactic Acidosis - likely some component of AKA and Tybe B lactic acidosis.  Add dextrose to IVF.  High dose IV thiamine.    Acute Liver Injury - suspect shock liver on background of BRITO.  Monitor LFTs closely / INR closely.  If not improved in am would probably benefit from GI evaluation.  Hold off on steroids for tonight for alcoholic hepatitis component.  Check hepatitis panel.    Elevated BNP - Echo in am    Alcoholism - monitor for impending withdrawal.  She changed her timeline several times when asked when her last drink was and was found \"surrounded by beer cans\".  Order CIWA protocol prn.  Hold off on scheduled benzos for now.  Can also use precedex if needed for agitation.    Glycemic control    Hold home psych medicines, could possibly contribute to hyponatremia and/or liver injury    Patient is Critically Ill due to MOF as outlined above with high risk of decompensation and death.    GI/PUD prophylaxis    NPO for now except meds    Critical Care time spent in direct patient care: 60 minutes (excluding procedure time, if applicable) including high complexity decision making to assess, manipulate, and support vital organ system failure in this individual who has impairment of one or more vital organ systems such that there is a high probability of imminent or life threatening deterioration in the patient’s condition.       Wilmer Garsia MD  Intensive Care Medicine  06/01/21 21:32 EDT "         Electronically signed by Wilmer Garsia MD at 06/01/21 1208

## 2021-06-09 NOTE — PROGRESS NOTES
"Hospice Progress Note    Patient Name: Laura Jang   : 1965  Gender: female    Code Status: comfort measures    Date of Admission: 2021    Subjective:    56yoF admitted in Hospice  for sepsis.    Overnight notes reviewed:  Neuro- disoriented to place/situation, mcneil equally. Resp- tachypneic, using abdominal muscles to breathe. Cardiac- palpable pulses, S1S2 heart tones. GI/- minimal UOP, comfort diet, FMS maintained. Comfort measures provided. Awaiting bed in palliative    Today, pt moved to [facility]. Both sons at bedside (Bucky and Desmond - both from [facility]). Pt is resting comfortably in bed; undisturbed by exam. [facility] asked for a pizza earlier today. She is taking sips via a spoon; yesterday she was able to drink from a straw.     - PRNs:  Robinul 0.4mg x1 @ 0039  Dilaudid 0.5mg x1 @ 1213  Ativan 0.5mg x1 @ 0237    - Held: none    - Intake/Output  *PO: sips and bites throughout the day  *Urine: 290ml  *LBM: FMS in place since       ROS:  Review of Systems   Unable to perform ROS: Acuity of condition       Reviewed current scheduled and prn medications for route, type, dose and frequency.     •  acetaminophen  •  furosemide  •  glycopyrrolate  •  haloperidol lactate  •  HYDROmorphone  •  ketorolac  •  LORazepam  •  palliative care oral rinse  •  polyvinyl alcohol  •  Scopolamine  •  sodium chloride    Objective:   BP 90/66   Pulse 94   Temp 97.7 °F (36.5 °C) (Bladder)   Resp 26   Ht 165.1 cm (65\")   Wt 93 kg (205 lb)   SpO2 94%   BMI 34.11 kg/m²        PPS: Palliative Performance Scale score as of 2021, 14:31 EDT is 30% based on the following measures:   Ambulation: Totally bed bound  Activity and Evidence of Disease: Unable to do any work, extensive evidence of disease  Self-Care: Total care  Intake: Reduced   LOC: Full, drowsy or confusion      Physical Exam:  Physical Exam  Constitutional:       General: She is not in acute distress.     Appearance: She is " ill-appearing.   HENT:      Head:      Comments: Thinning hair with hair loss noted posterior scalp     Mouth/Throat:      Mouth: Mucous membranes are dry.   Eyes:      Comments: closed   Neck:      Comments: Increased neck circumference  Cardiovascular:      Rate and Rhythm: Normal rate and regular rhythm.   Abdominal:      General: There is distension.      Palpations: Abdomen is soft.      Comments: Hyperactive BSx4   Genitourinary:     Comments: Nunes with straw colored output; FMS with minimal drainage in bag  Musculoskeletal:      Comments: +3 pitting edema BLE   Skin:     General: Skin is dry.      Coloration: Skin is jaundiced.      Comments: petechial areas throughout BUE   Neurological:      Comments: Does not follow commands   Psychiatric:      Comments: No facial grimacing; no moaning           Sepsis (CMS/[facility])      Assessment/Plan:     56yoF admitted inpt Hospice 6/8 for sepsis.    AMS  Dyspnea  Anxiety  Pain, nos  EOLC    Continue to monitor output in FMS and assess for possible removal    Continue current plan of care    Prns reviewed/adjusted for comfort    Discussion at bedside with loving son regarding safe swallowing/feeding practices. Support provided. Hospice contact information shared.     Coordinated care with Nursing and Hospice IDT    Discharge Disposition: EOLC    Total Visit Time: 25min  Face to Face Time: 15min    Justification for care:  Patient meets criteria for acute in-patient care with required nursing assessment and interventions for symptoms with IV medications.      Linda Soliz, JESSICA, MHA, APRN  [facility] Care Navigators  Hospice and Palliative Care Nurse Practitioner  06/09/21  08:58 EDT

## 2021-06-09 NOTE — H&P
Renetta Santizo MD      HPI:       Past Medical History:   Diagnosis Date   • Alcohol abuse    • Anxiety    • Arthritis    • Blistering of skin 3/1/2020   • Degenerative disc disease, lumbar    • Depression    • Hypertension      Past Surgical History:   Procedure Laterality Date   • APPENDECTOMY     • BARIATRIC SURGERY      2005, gastric sleeve   • CARPAL TUNNEL RELEASE Bilateral     07/2020   • CHOLECYSTECTOMY     • HERNIA REPAIR      2014   • SPINE SURGERY      cyst removal     Current Code Status     Date Active Code Status Order ID Comments User Context       6/8/2021 1152 No CPR [id number]  Annmarie Parr MD Inpatient     Advance Care Planning Activity      Questions for Current Code Status     Question Answer Comment    Code Status No CPR     Medical Interventions (Level of Support Prior to Arrest) Comfort Measures     Level Of Support Discussed With Health Care Surrogate         Scheduled Meds:palliative care oral rinse, , Mouth/Throat, TID    PRN Meds:.•  acetaminophen  •  furosemide  •  glycopyrrolate  •  haloperidol lactate  •  HYDROmorphone  •  ketorolac  •  LORazepam  •  palliative care oral rinse  •  polyvinyl alcohol  •  Scopolamine  •  sodium chloride    No Known Allergies  Family History   Problem Relation Age of Onset   • Arthritis Mother    • Cancer Mother    • Obesity Mother    • Mental illness Mother    • Hypertension Mother    • Arthritis Sister    • Obesity Sister    • Mental illness Sister    • Hypertension Sister    • Obesity Brother    • Mental illness Brother    • Hypertension Brother    • Cancer Maternal Grandmother    • Breast cancer Neg Hx    • Ovarian cancer Neg Hx      Social History     Socioeconomic History   • Marital status:      Spouse name: Not on file   • Number of children: Not on file   • Years of education: Not on file   • Highest education level: Not on file   Tobacco Use   • Smoking status: Never Smoker   • Smokeless tobacco: Never Used   Substance and Sexual  "Activity   • Alcohol use: Yes     Alcohol/week: 6.0 standard drinks     Types: 6 Cans of beer per week     Comment: quit 02/18/2020, prior heavy alcohol use for 15y   • Drug use: Not Currently   • Sexual activity: Not Currently     Partners: Male     Birth control/protection: Post-menopausal     Review of Systems -       BP 90/66   Pulse 94   Temp 97.7 °F (36.5 °C) (Bladder)   Resp 26   Ht 165.1 cm (65\")   Wt 93 kg (205 lb)   SpO2 94%   BMI 34.11 kg/m²     Intake/Output Summary (Last 24 hours) at 6/9/2021 0252  Last data filed at 6/9/2021 0200  Gross per 24 hour   Intake 876.8 ml   Output 275 ml   Net 601.8 ml     Physical Exam:        Results from last 7 days   Lab Units 06/07/21  0257   WBC 10*3/mm3 28.32*   HEMOGLOBIN g/dL 11.0*   HEMATOCRIT % 32.3*   PLATELETS 10*3/mm3 143     Results from last 7 days   Lab Units 06/07/21  0257   SODIUM mmol/L 135*   POTASSIUM mmol/L 3.8   CHLORIDE mmol/L 101   CO2 mmol/L 20.0*   BUN mg/dL 74*   CREATININE mg/dL 4.97*   CALCIUM mg/dL 8.7   BILIRUBIN mg/dL 10.4*   ALK PHOS U/L 247*   ALT (SGPT) U/L 208*   AST (SGOT) U/L 368*   GLUCOSE mg/dL 93     Results from last 7 days   Lab Units 06/07/21  0257   SODIUM mmol/L 135*   POTASSIUM mmol/L 3.8   CHLORIDE mmol/L 101   CO2 mmol/L 20.0*   BUN mg/dL 74*   CREATININE mg/dL 4.97*   GLUCOSE mg/dL 93   CALCIUM mg/dL 8.7     Imaging Results (Last 72 Hours)     ** No results found for the last 72 hours. **        Impression:  Plan:         Annmarie Parr MD  6/8/21          "

## 2021-06-09 NOTE — INTERVAL H&P NOTE
H&P updated. The patient was examined and the following changes are noted:     Patient admitted to inpatient Hospice today for comfort focused end of life care after hospitalization 6/1 - 6/8/2021.Terminal dx sepsis (From E coli UTI and Clostridium bacteremia), with related dx of alcoholic liver cirrhosis (MELD 36), shock liver, ABDI with oliguria, anorexia, and  persistent encephalopathy and debility.  Prognosis days to weeks.    Patient seen by full hospice interdisciplinary team.  Additional provider note to follow.    Annmarie Parr MD  Hospice Physician

## 2021-06-10 NOTE — PROGRESS NOTES
Continued Stay Note  [facility]     Patient Name: Laura Jang  MRN: [id number]  Today's Date: 6/10/2021    Admit Date: 6/8/2021    Discharge Plan     Row Name 06/10/21 1728       Plan    Plan  [facility] inpatient hospice    Plan Comments PPS weak 20 % Laura Jang is a 55-year-old white female who was brought to the ED 6/1 via EMS after friends found her unresponsive in her apartment. Pt was very drowsy at time of visit, not able to communicate.  She would open eyes when named called and smile, then back to sleep.  Both sons are at bedside, emotional support provided.  They had requested a  to pray with their mom.  She has had no intake in last 24 hours.  She is requiring GIP for skilled nursing assessment of meds given, 2 for ADLs and interventions to promote comfort.  PRNs last 24 hours include Dilaudid x1, it was effective.  Safety measures in place.  Will continue to monitor.    Final Discharge Disposition Code  30 - still a patient        Discharge Codes    No documentation.             Carissa Moy RN

## 2021-06-10 NOTE — PROGRESS NOTES
"Hospice Progress Note    Patient Name: Laura Jang   : 1965  Gender: female    Code Status: comfort measures    Date of Admission: 2021    Subjective:    56yoF admitted inpt Hospice  for sepsis.    Overnight notes reviewed: Appears comfortable. No PRN's given, family at bedside. Repositioning as needed.    Today pt remains comfortable. Sleeping - did not open her eyes; undisturbed by exam. Minimal urine output noted previous 24h. Family noted increase in peripheral edema. No audible congestion.    - PRNs: Dilaudid 0.5mg x1    - Held: none    - Intake/Output  *PO: sips  *Urine: 10ml  *LBM: FMS with minimal output      ROS:  Review of Systems   Unable to perform ROS: Acuity of condition       Reviewed current scheduled and prn medications for route, type, dose and frequency.     •  acetaminophen  •  furosemide  •  glycopyrrolate  •  haloperidol lactate  •  HYDROmorphone  •  ketorolac  •  LORazepam  •  palliative care oral rinse  •  polyvinyl alcohol  •  Scopolamine  •  sodium chloride    Objective:   BP 90/66   Pulse 94   Temp 97.7 °F (36.5 °C) (Bladder)   Resp 26   Ht 165.1 cm (65\")   Wt 93 kg (205 lb)   SpO2 94%   BMI 34.11 kg/m²        PPS: Palliative Performance Scale score as of 6/10/2021, 15:50 EDT is 20% based on the following measures:   Ambulation: Totally bed bound  Activity and Evidence of Disease: Unable to do any work, extensive evidence of disease  Self-Care: Total care  Intake:Minimal sips  LOC: Full, drowsy or confusion      Physical Exam:  Physical Exam  Constitutional:       Appearance: She is ill-appearing.      Comments: Undisturbed by exam   HENT:      Mouth/Throat:      Mouth: Mucous membranes are dry.   Eyes:      Comments: closed   Neck:      Comments: Increased neck circumference  Cardiovascular:      Rate and Rhythm: Normal rate and regular rhythm.      Comments: DHT  + radial pulses  +3 pitting edema b/l extremities  Pulmonary:      Comments: Shallow, even, nonlabored, " CTA  Abdominal:      General: There is distension (? less distended than yesterday vs positional ).      Palpations: Abdomen is soft.      Comments: BS x4   Genitourinary:     Comments: Nunes with minimal dark urine; FMS in place  Musculoskeletal:      Right lower leg: Edema present.      Left lower leg: Edema present.   Skin:     General: Skin is dry.      Coloration: Skin is jaundiced.   Neurological:      Comments: Does not follow commands   Psychiatric:      Comments: No facial grimacing; no moaning         Sepsis (CMS/HCC)      Assessment/Plan:     56yoF admitted inpt Hospice 6/8 for sepsis.     AMS  Dyspnea  Anxiety  Pain, nos  EOLC    Continue to monitor output in FMS and assess for possible removal     Continue current plan of care     Prns reviewed/adjusted for comfort    Family remains at bedside around the clock - discussion on end of life s/s and symptom mgmt. Family desires medication prn for now and will ask Nursing if/when needed. Support provided. They have Hospice contact information.     Unit Manager aware of request for family to all be a bedside this evening for a limited time (5 family members total).     Discharge Disposition: EOLC    Total Visit Time: 45min  Face to Face Time: 35min    Justification for care:  Patient meets criteria for acute in-patient care with required nursing assessment and interventions for symptoms with IV medications.      Linda Soliz, JESSICA, MHA, APRN  [facility]  Hospice and Palliative Care Nurse Practitioner  06/10/21  15:40 EDT

## 2021-06-11 NOTE — PROGRESS NOTES
Continued Stay Note  [facility]     Patient Name: Laura Jang    Today's Date: 6/11/2021    Admit Date: 6/8/2021    Assessment Note:  6/11 10% pps Laura Jang is a 55-year-old female admitted to hospice on 6/8 for sepsis.  Patient is unresponsive respirations labored, 4+ generalized edema. Patient has required 5 PRN Dilaudid in the last 24 hours and Ativan x2 and Haldol x2. Robinul for secretions. Minimal Ua output. Skin warm and dry. Family at bedside supportive and tearful during EOL discussions. Continue to monitor for nonverbal indications of pain or dyspnea.   Discharge plan:  Currently patient remains GIP for complications of EOL care requiring skilled nursing and medical management of symptoms. Discharge plan dependent on a decreased level of care and survival of this admission.      POC:  Family support and education R/T EOL care      Hospice Criteria:  Hospice care provides support and care for persons and their families with a life limiting disease. Hospice is a Medicare benefit and requires   1) Patient prognosis is 6 months or less to live,   2) Patient no longer going through curative therapies.   3) Agreement of 2 physicians that patient's condition is life limiting and will most likely result in death in <6 months.  Hospice is a Level of care not a location and can be provided in various settings based on patient's needs.    Medicare guidelines state that hospice patients in the hospital require skilled nursing, medical interventions and a level of care that cannot be met in any other setting. This stay is most commonly less then two week.   A patient may be Hospice appropriate but not inpatient appropriate based on this criteria.  Once a Hospice referral is reviewed and  appears to be stable enough to be transferred to a lower level of care with hospice then that is the preference.     Tegan NORWOOD. RN. CHPN.  [facility] Nav[facility]ators  Hospice Care  [phone]

## 2021-06-11 NOTE — PROGRESS NOTES
"Hospice Progress Note    Patient Name: Laura Jang   : 1965  Gender: female    Code Status: comfort measures    Date of Admission: 2021    Subjective:    56yoF admitted inpt Hospice  for sepsis.    Over night notes reviewed.     Today pt is comfortable after multiple prns overnight and today, which is a change from previous two days of needing only one prn in 24h. Pt undisturbed by exam. Does have dark outline on lips from secretions. Minimal urine output. Son, Bucky, at bedside.     - PRNs:  Robinul 0.4mg x1  Haldol 1mg x2  Dilaudid 0.5mg x7  Ativan 0.5mg x2    - Held: none    - Intake/Output  *PO: NPO  *Urine: minimal output in   *LBM: FMS with minimal output - today is day #3      ROS:  Review of Systems   Unable to perform ROS: Patient unresponsive       Reviewed current scheduled and prn medications for route, type, dose and frequency.     •  acetaminophen  •  furosemide  •  glycopyrrolate  •  haloperidol lactate  •  HYDROmorphone  •  ketorolac  •  LORazepam  •  palliative care oral rinse  •  polyvinyl alcohol  •  Scopolamine  •  sodium chloride    Objective:   BP 90/66   Pulse 94   Temp 98 °F (36.7 °C) (Axillary)   Resp 26   Ht 165.1 cm (65\")   Wt 93 kg (205 lb)   SpO2 94%   BMI 34.11 kg/m²      PPS: Palliative Performance Scale score as of 2021, 16:29 EDT is 10% based on the following measures:   Ambulation: Totally bed bound  Activity and Evidence of Disease: Unable to do any work, extensive evidence of disease  Self-Care: Total care  Intake:  Mouth care only  LOC: Drowsy or coma      Physical Exam:  Physical Exam  Constitutional:       Appearance: She is ill-appearing.      Comments: Comfortable; undisturbed by exam   HENT:      Head: Atraumatic.      Mouth/Throat:      Comments: Lips are lined with dark, dried blood - will reappear after a few minutes on lips after they are cleaned.  Eyes:      Comments: + b/l Scleral icterus   Neck:      Comments: Increased neck " circumference  Cardiovascular:      Comments: Difficult to appreciate radial pulses today   DHT - HR 70s  Pulmonary:      Comments: + abd use with respirations. At times more significant than others - unchanged with medications per staff/family. Respiration rate is regular (RR16-20) but quality of respirations changes - some quick and shallow while at other times longer inspir/expir breaths. CTA. Dark bloody oral secretions; no audible congestion.   Abdominal:      Comments: abd appears less distended today than previous days; tinkering of BS lowe quads. Hypoactive BSx2 upper quads. Soft.    Genitourinary:     Comments: Nunes with minimal dark yellow output; FMS with minimal output  Skin:     Coloration: Skin is jaundiced.      Comments: +3 pitting BLE edema; BUE appear to have less edema today   Neurological:      Comments: Does not follow commands   Psychiatric:      Comments: No facial grimacing; occasional expir sound - soft - hum vs moan. Isolated, not consistent. Pt comfortable.            Sepsis (CMS/[facility])      Assessment/Plan:     56yoF admitted in Hospice 6/8 for sepsis.     AMS  Dyspnea  Anxiety  Pain, nos  EOLC    Schedule dilaudid 0.5mg q6h    Protonix 40mg IV BID - suspect UGIB given continuous dark bloody oral secretions - for comfort in an effort to minimize further bleeding    Suction set up at bedside please    Continue to monitor FMS for discontinuation    Prns reviewed/adjusted for comfort    Appreciate Nursing support with family through this difficult time    Continue scheduled and prn palliative oral rinse     Coordinated care with Nursing, Hospice IDT, and on-call Provider     Discussion at bedside with son, Bucky, regarding s/s end of life and symptom mgmt. Discussed pain in context of existential suffering. Support provided. Family has Hospice contact information.    Discharge Disposition: EOLC    Total Visit Time: 65min - visit, documentation, care coordination   Face to Face Time:  30min    Justification for care:  Patient meets criteria for acute in-patient care with required nursing assessment and interventions for symptoms with IV medications.      Linda Soliz DNP, MHA, APRN  [facility]  Hospice and Palliative Care Nurse Practitioner  06/11/21  15:26 EDT

## 2021-06-12 NOTE — SIGNIFICANT NOTE
Exam confirms with auscultation zero audible heart tones and zero audible respirations. Ms.Kelli Jang was pronounced dead at 0236.  MD notified by Patient's RN.    Charo Talbot RN  Clinical House Supervisor  6/12/2021 03:20 EDT

## 2021-06-15 NOTE — PAYOR COMM NOTE
"Luci Rizzo, RN Utilization Review [phone]  Fax # [phone]  Ref # [id number]    Please note discharge date.    Radha Patiño (Dcsd. Female)     Date of Birth Social Security Number Address Home Phone MRN    1965  [address] [place] [id number] [phone] [id number]    M[employer]on Marital Status          Islam        Admission Date Admission Type Admitting Provider Attending Provider Department, Room/Bed    6/1/21 Emergency Wilmer Garsia MD  [facility] 2HSIC, S260/1    Discharge Date Discharge Disposition Discharge Destination        6/8/2021 Hospice/Medical Facility (Formerly named [facility])              Attending Provider: (none)   Allergies: No Known Allergies    Isolation: None   Infection: MRSA (06/02/21)   Code Status: Prior    Ht: 165.1 cm (65\")   Wt: 93 kg (205 lb)    Admission Cmt: None   Principal Problem: Sepsis and septic shock due to Gram negative bacteria.  Urinary tract origin (CMS/[facility]) [A41.50]                 Active Insurance as of 6/1/2021     Primary Coverage     Payor Plan Insurance Group Employer/Plan Group    ANTH BLUE CROSS [facility] BLUE CROSS BLUE SHIELD PPO [id number]     Payor Plan Address Payor Plan Phone Number Payor Plan Fax Number Effective Dates    [address] [phone]  1/1/2020 - None Entered    [facility] [id number]       Subscriber Name Subscriber Birth Date Member ID       RADHA PATIÑO 1965 [id number]           Secondary Coverage     Payor Plan Insurance Group Employer/Plan Group    [facility]      Payor Plan Address Payor Plan Phone Number Payor Plan Fax Number Effective Dates    [address] [phone]  3/25/2020 - None Entered    [facility] [id number]       Subscriber Name Subscriber Birth Date Member ID       RADHA PATIÑO 1965 [id number]                 Emergency Contacts      (Rel.) Home Phone Work Phone Mobile Phone    ALLENMARAL VIDAL (Sister) [phone] -- --    " clarence jang (Spouse) [phone] -- --               Discharge Summary      Meño Strong MD at 21 1122          Discharge Summary    Patient name: Laura Jang  CSN: [id number]  MRN: [id number]  : 1965  Today's date: 2021     Date of Admission: 2021    Date of Discharge:  2021    Admitting Physician: LOYDA Garsia MD    Attending Physician: JEFFY Strong MD    Primary Care Provider: Renetta Santizo MD    Consultations:   Dr. Carlisle- Gastroenterlogy  Dr. English- Nephrology  DAVIAN Nam- Palliative Care    Admission Diagnosis:   Alcoholism (CMS/HCC)    Moderate episode of recurrent major depressive disorder (CMS/HCC)    Elevated lactic acid level    Hyponatremia    Hypokalemia    Hypochloremia    ABDI (acute kidney injury) (CMS/HCC)    Elevated lipase    Hypoproteinemia (CMS/HCC)    Elevated bilirubin    Leukocytosis    Anemia    UTI (urinary tract infection)    Discharge Diagnoses:     Sepsis and septic shock due to Gram negative bacteria.  Urinary tract origin (CMS/HCC)    Alcoholism (CMS/HCC)    Hyponatremia resolving    Hypokalemia    Elevated bilirubin    Leukocytosis    Severe anemia due to GI losses (source unknown)    E. coli UTI (urinary tract infection)    Acute renal failure (CMS/HCC)    Shock liver improved    Hypophosphatemia    Mild to moderate mitral stenosis on echocardiogram    Alcoholic cirrhosis (CMS/HCC)    Moderate episode of recurrent major depressive disorder (CMS/HCC)    Procedures:         History of Present Illness:  Ms. Jang is a 54 yo female with PMH extensive alcoholism, anxiety/depression, DDD, and HTN who presented to ED via EMS after being found down. According to patient and documentation, patient lost her job 4 weeks ago. Since then her drinking has worsened. About 2 weeks prior to admission she decided she needed to wean her self off alcohol and with the aid of her therapist has been doing fairly well. Her therapist changed one of her medications  (unclear which one) from daily to BID and since then she has noticed an increase in lethargy/weakness.  On 6/1/2021 a friend went to check on her after they hadn't heard from her in a few weeks. They found her down in her home surrounded by beer cans with fecal incontenance. EMS was called and she was brought to ED for evaluation. Patient notes the last time she took her new medication was 4 days pta.     VS on arrival: HR 87, tep 98.2, RR 18, /52, SpO2 96%. Labs of significance include: lipase 74, , Tylenol < 5, BNP 3736, lactate 9.7, Urine osmo 249, Serum osmo 251, covid negative, UA + ketones, bili, blood, protein, leuk esterase, nitrites, WBC and bacteria, UDS/ETOH/Salicylate/Tylenol all negative, glucose 124, BUN 18, creat 4.48, Na 107, K 3, Chloride 64, Co2 16, total protein 5.5, AST 2211, , Alk phos 139, bili 6.7, GFR < 10, anion gap 27, troponin 0.023, Mag 1.6, WBC 12.97, HgB 8.6, Plts 183.     CT Head Noncontributory. CXR with enlarged cardiac silhouette as well as increased central vascular congestion, no overt edema or pleural effusions.      In ED she was given a dose of Ceftriaxone. With her significant lab abnormalities as well as potential for decompensation, she was admitted to ICU for management.      On assessment the patient was alert and oriented. Scleral icterus, no abdominal distention, no significant ascites. VSS.    Hospital Course:  Laura Jang is a 56 y.o. female who presented to [facility] as discussed in HPI.  She was admitted to ICU and started on sepsis bundle.  Treatment for hyponatremia was initiated and due to acute kidney/liver injury, she was seen in consult by nephrology, and gastroenterology.  PT/INR was elevated at 3.68 which did begin to trend down.  Nephrology changed fluids to NS, and electrolytes were followed with replacement for hyponatremia, and hypophosphatemia.  The patient was cultured and received a dose of Zosyn but never received  vancomycin.  Was also begun on fluids and pressors.  By 6/2/2021 patient seemed to feel better with fluids, pressors, and antimicrobial therapy and lactic acid level decreased.  Mentation initially seemed to improve but when hematocrit dropped to 22 she required transfusion with PRBC which was associated with increased serum ammonia and increased confusion.  Urine cultures returned growing a sensitive E. coli and she was continued on Zosyn (a positive flora in her blood culture was felt to be contaminant). Nasal swab PCR however was positive for MRSA. Renal function remained compromised  and nephrology was asked to see.  Patient had remained a full code per her estranged , but after discussions between the  and the patient's daughter who is a physician practicing in [place], decisions were made to proceed with no CPR and DNI but continued limited support. She continued to require norepinephrine for blood pressure support and continued to have low UOP (450 cc out over 24 hours).  Creatinine continued to slowly drop and hospital day #5 was down to 4.22 (from 4.31), but BUN increased from 39 to 57 despite fluid balance being approximately 13 L positive since admission.  No arrhythmias or noted.  Gas exchange remained very good with O2 sats of 93% on room air.    She continued to be placed on BiPAP at night because of shallow rapid respirations and an attempt to rest her diaphragms.  She had a suboptimal cough.  Patient remained afebrile but WBC increased to 25.86 despite broad-spectrum coverage with Zyvox and Zosyn and (to cover positive MRSA PCR screen and potential pneumonia as well as Zosyn to cover E. coli UTI).  Transaminases continued to drop although bilirubin remained unchanged at 10.    Her INR returned to 1.79 (from a high of 3.68).  She continued to tolerate enteral nutrition at goal (Impact peptide 50 cc an hour).    Her encephalopathy remained (She will follow commands with loud verbal as  "well as tactile stimulation, but remained confused/encephalopathic and difficult to understand).  She remained on lactulose (with significant diarrhea requiring FMS) and rifaximin but by 6/6/2021 her serum ammonia increased from 56, to 120.  Patient started developing worsening renal failure and hypotension requiring pressor support.  Nephrology team recommended hemodialysis for continued  aggressive treatment.  We were planning to start patient on hemodialysis but after discussions with patient's family members including patient's sister who had the verbal release from patient's estranged  to make medical decisions and patient's son decision was made not to pursue hemodialysis.  Family told us that patient had very poor quality of life and and with her previous discussions and advanced directives this will not be in line with her goals of care.  They wanted to not pursue dialysis and focus more on comfort care when rest of the family is able to come see her.  She was seen in consult by palliative care and after further discussion it was decided to begin transition to comfort care/hospice.  She was accepted into inpatient hospice and will be transferred to their care on 6/8/2021.        Vitals:  /57 (BP Location: Left arm, Patient Position: Lying)   Pulse 90   Temp 97.7 °F (36.5 °C) (Bladder)   Resp 28   Ht 165.1 cm (65\")   Wt 93 kg (205 lb)   SpO2 96%   BMI 34.11 kg/m²     Advance Directives: Code Status and Medical Interventions:   Ordered at: 06/07/21 1448     Limited Support to NOT Include:    Intubation    Cardioversion/Defibrillation    Dialysis     Level Of Support Discussed With:    Health Care Surrogate    Next of Kin (If No Surrogate)     Code Status:    No CPR     Medical Interventions (Level of Support Prior to Arrest):    Limited        Physical Exam:  General Appearance: Eyes open, and mild respiratory distress with paradoxical breathing but without any acute distress  Head:    " Atraumatic, Normocephalic, scleral icterus, Pupils reactive & symmetrical B/L.  Neck:     Supple.            Lungs:   B/L Breath sounds present with decreased breath sounds on bases, no wheezing heard, occ crackles.   Heart: S1 and S2 present, no murmur\  Abdomen: Obese soft, nontender, no guarding or rigidity, bowel sounds hypoactive.  Extremities: Atraumatic, no cyanosis or clubbing,  + edema, warm to touch.  Pulses: Positive and symmetric.  Neurologic: Remains encephalopathic and wrist     restraints    Labs:  Results from last 7 days   Lab Units 06/07/21  0257   WBC 10*3/mm3 28.32*   HEMOGLOBIN g/dL 11.0*   HEMATOCRIT % 32.3*   PLATELETS 10*3/mm3 143     Results from last 7 days   Lab Units 06/07/21  0257   SODIUM mmol/L 135*   POTASSIUM mmol/L 3.8   CHLORIDE mmol/L 101   CO2 mmol/L 20.0*   BUN mg/dL 74*   CREATININE mg/dL 4.97*   CALCIUM mg/dL 8.7   BILIRUBIN mg/dL 10.4*   ALK PHOS U/L 247*   ALT (SGPT) U/L 208*   AST (SGOT) U/L 368*   GLUCOSE mg/dL 93         Magnesium   Date Value Ref Range Status   06/07/2021 2.0 1.6 - 2.6 mg/dL Final     Phosphorus   Date Value Ref Range Status   06/07/2021 3.4 2.5 - 4.5 mg/dL Final   06/06/2021 2.0 (L) 2.5 - 4.5 mg/dL Final                 Discharge Medications:     Discharge Medications        ASK your doctor about these medications        Instructions Start Date   acyclovir 200 MG capsule  Commonly known as: ZOVIRAX   Oral, Every 4 Hours While Awake, 400mg the first day and 200mg for 4 days.      acyclovir 5 % cream  Commonly known as: ZOVIRAX   Topical, Every 3 Hours      B-100 B-COMPLEX PO   Oral, Daily      BIOTIN 5000 PO   5,000 mcg, Oral, Daily      busPIRone 10 MG tablet  Commonly known as: BUSPAR   10 mg, Oral, 2 times daily      cetirizine 10 MG tablet  Commonly known as: zyrTEC   10 mg, Oral, Daily      clindamycin-benzoyl peroxide 1-5 % gel  Commonly known as: BENZACLIN   Topical, Daily      cloNIDine 0.1 MG tablet  Commonly known as: CATAPRES   0.1 mg, Oral,  Daily      doxepin 10 MG capsule  Commonly known as: SINEquan   10 mg, Oral, Nightly      FLUoxetine 20 MG capsule  Commonly known as: PROzac   20 mg, Oral, Daily      folic acid 1 MG tablet  Commonly known as: FOLVITE   1 mg, Oral, Daily      losartan 50 MG tablet  Commonly known as: COZAAR   50 mg, Oral, Daily      multivitamin with minerals tablet tablet   2,500 mcg, Oral, Daily      Multivitamin Gummies Womens chewable tablet   50 mg, Oral, Daily      Rexulti 0.5 MG tablet  Generic drug: Brexpiprazole   0.5 mg, Oral, Daily      vitamin B-12 1000 MCG tablet  Commonly known as: CYANOCOBALAMIN   3,000 mcg, Oral, Daily      vitamin D3 125 MCG (5000 UT) capsule capsule   5,000 Units, Oral, Daily               Discharge Diet:    NPO    Activity at Discharge:    Bedrest    Follow-up Appointments  No future appointments.       Discharge Instructions:  To Hospice for end of life care       DAVIAN Longoria, [facility]  Pulmonary & Critical Care Medicine      Time: I spent 30 minutes on this discharge activity which included: face-to-face encounter with the patient, reviewing the data in the system, coordination of the care with the nursing staff as well as consultants, documentation, and entering orders.       CC: Renetta Santizo MD         [unfilled]    Electronically signed by Meño Strong MD at 06/09/21 1249       Discharge Order (From admission, onward)     Start     Ordered    06/08/21 1120  Discharge readmit patient  Once     Expected Discharge Date: 06/08/21    Expected Discharge Time: 11:19    Discharge Disposition: Hospice/Medical Facility ([facility])    Physician of Record for Attribution - Please select from Treatment Team: MEÑO STRONG [[id number]]    Review needed by CMO to determine Physician of Record: No       Question Answer Comment   Physician of Record for Attribution - Please select from Treatment Team MEÑO STRONG    Review needed by CMO to determine Physician of  Record No        06/08/21 1121

## 2021-06-16 NOTE — DISCHARGE SUMMARY
Date of Death: 6/12/2021  Time of Death:  0236    Presenting Problem/History of Present Illness    Sepsis (CMS/HCC)      Hospital Course    Patient admitted to inpatient Hospice today for comfort focused end of life care after hospitalization 6/1 - 6/8/2021.Terminal dx sepsis (From E coli UTI and Clostridium bacteremia), with related dx of alcoholic liver cirrhosis (MELD 36), shock liver, ABDI with oliguria, anorexia, and  persistent encephalopathy and debility.  Prognosis days to weeks.      Social History:  Social History     Tobacco Use   • Smoking status: Never Smoker   • Smokeless tobacco: Never Used   Substance Use Topics   • Alcohol use: Yes     Alcohol/week: 6.0 standard drinks     Types: 6 Cans of beer per week     Comment: quit 02/18/2020, prior heavy alcohol use for 15y         Consults:   Consults     Date and Time Order Name Status Description    6/7/2021 11:39 AM Inpatient Palliative Care MD Consult Completed     6/6/2021  8:51 AM Inpatient Infectious Diseases Consult Completed     6/4/2021 12:33 AM Inpatient Nephrology Consult Completed     6/2/2021 12:34 AM Inpatient Gastroenterology Consult Completed           Exam confirms with auscultation zero audible heart tones and zero audible respirations. Ms.Kelli Jang was pronounced dead at 0236.  MD notified by Patient's RN.     Charo Talbot RN  Clinical House Supervisor  6/12/2021 03:20 EDT      Linda Soliz, DNP, MHA, APRN  [facility]  Hospice and Palliative Care Nurse Practitioner  06/16/21  14:17 EDT

## 2021-10-24 NOTE — PLAN OF CARE
Problem: Adult Inpatient Plan of Care  Goal: Plan of Care Review  Outcome: Ongoing, Progressing  Flowsheets  Taken 6/11/2021 0556 by Aida Pacheco RN  Progress: declining  Outcome Summary: Pt's spouse and son at bedside. Spoke with pt's son multiple times regarding administering meds for comfort. Bucky still continues to be hesitant with med administration. He continues to state he does not want the pt sedated. Pt's spouse Desmond ok with medicating pt. Pt medicated multiple times for moaning, tachypenia, and labored breathing.   Pt opened eyes at the beginning shift, but is now unresponsive.   
Continued Stay Note  [facility]     Patient Name: Laura Jang  MRN: [id number]  Today's Date: 6/8/2021    Admit Date: 6/8/2021    Patient admitted to inpatient hospice today. POC formulated with family and new orders received.     Guerda Morris, RN  Goal Outcome Evaluation:                 
Continued Stay Note  [facility]     Patient Name: Laura Jang  MRN: [id number]  Today's Date: 6/9/2021    Admit Date: 6/8/2021 6/9 PPS 20 % Laura Jang is a 55-year-old white female who was brought to the ED 6/1 via EMS after friends found her unresponsive in her apartment. Pt was awake this AM and son Yao at her side. Pt denies pain at this time. Pt kept dosing off through assessment. Breathe sounds clear but pt abdominal breathing, BS active, NPO, Nunes drained 40 cc urine, last BM 6/7 with fecal management system.  Skin warm, dry and jaundice. Abdomen has ascites. Pulses present but weak. Wounds are dressed and dry and pt turned q 2 hr. Educated son on Morphine and its purpose. Son verbalizes understanding and wanted to have her more alert for the other brothers visit.  to visit tomorrow. Family have been estranged for many years. PRN robinul 0.4 mg x 1, Ativan 0.5 mg x1/ 24 hr. given for secretions and anxiety. Discharge plan: Currently patient remains GIP for complications of EOL care requiring skilled nursing and medical management of symptoms. Discharge plan dependent on a decreased level of care and survival of this admission.        Guerda Morris, RN  Goal Outcome Evaluation:                 
Goal Outcome Evaluation:               Neuro- disoriented to place/situation, mcneil equally. Resp- tachypneic, using abdominal muscles to breathe. Cardiac- palpable pulses, S1S2 heart tones. GI/- minimal UOP, comfort diet, FMS maintained. Comfort measures provided. Awaiting bed in palliative.   
Goal Outcome Evaluation:              Outcome Summary: pain med given 1x since arrival to floor, both sons and sister at bedside, pt turned, monitoring, pt comfortable  
Goal Outcome Evaluation:      Pt transitioned to inpatient hospice today.  Pt denies pain, remains pleasantly confused but cooperative with care.  Son at bedside most of day.  Pt not requiring any prn meds this shift.  Cont to TQ2/reposition for comfort.           
Goal Outcome Evaluation:  Plan of Care Reviewed With: family        Progress: declining  Outcome Summary: Pt remains 10% PPS. Now on scheduled medications which appears effective in managing discomfort. Pt appears peaceful with relaxed face. Noted to have some slight oozing of bright red blood from mouth. Family verbalizes understanding and agrees with POC for q 6 hours. Son remains at bedside.  
Goal Outcome Evaluation:  Plan of Care Reviewed With: family        Progress: no change  Outcome Summary: Dilaudud given prn once, robinul once.  family states they would prefeer pt is not too sedated. CTM.  
Goal Outcome Evaluation:  Plan of Care Reviewed With: patient        Progress: no change  Outcome Summary: Appears comfortable. No PRN's given, family at bedside. Repositioning as needed.  
Time-based billing (NON-critical care)
Time-based billing (NON-critical care)